# Patient Record
Sex: MALE | Race: WHITE | Employment: OTHER | URBAN - METROPOLITAN AREA
[De-identification: names, ages, dates, MRNs, and addresses within clinical notes are randomized per-mention and may not be internally consistent; named-entity substitution may affect disease eponyms.]

---

## 2017-01-26 ENCOUNTER — TELEPHONE (OUTPATIENT)
Dept: FAMILY MEDICINE CLINIC | Age: 77
End: 2017-01-26

## 2017-01-31 ENCOUNTER — TELEPHONE (OUTPATIENT)
Dept: FAMILY MEDICINE CLINIC | Age: 77
End: 2017-01-31

## 2017-02-08 PROBLEM — R10.30 LOWER ABDOMINAL PAIN: Status: ACTIVE | Noted: 2017-02-08

## 2017-02-22 DIAGNOSIS — I49.3 PVC (PREMATURE VENTRICULAR CONTRACTION): ICD-10-CM

## 2017-02-23 RX ORDER — AMIODARONE HYDROCHLORIDE 200 MG/1
TABLET ORAL
Qty: 30 TABLET | Refills: 5 | Status: SHIPPED | OUTPATIENT
Start: 2017-02-23 | End: 2017-05-22 | Stop reason: SDUPTHER

## 2017-03-13 ENCOUNTER — HOSPITAL ENCOUNTER (OUTPATIENT)
Dept: ULTRASOUND IMAGING | Age: 77
Discharge: HOME | End: 2017-03-13
Attending: INTERNAL MEDICINE | Admitting: INTERNAL MEDICINE

## 2017-03-13 DIAGNOSIS — I15.9 SECONDARY HYPERTENSION: ICD-10-CM

## 2017-03-13 DIAGNOSIS — I10 ESSENTIAL (PRIMARY) HYPERTENSION: ICD-10-CM

## 2017-03-13 LAB
ALBUMIN SERPL-MCNC: 3.9 G/DL (ref 3.4–5)
ANION GAP SERPL CALCULATED.3IONS-SCNC: 13 MMOL/L (ref 3–16)
BUN BLDV-MCNC: 20 MG/DL (ref 7–20)
CALCIUM SERPL-MCNC: 9 MG/DL (ref 8.3–10.6)
CHLORIDE BLD-SCNC: 105 MMOL/L (ref 99–110)
CO2: 27 MMOL/L (ref 21–32)
CREAT SERPL-MCNC: 1.1 MG/DL (ref 0.8–1.3)
CREATININE URINE: 191.4 MG/DL (ref 39–259)
GFR AFRICAN AMERICAN: >60
GFR NON-AFRICAN AMERICAN: >60
GLUCOSE BLD-MCNC: 86 MG/DL (ref 70–99)
HCT VFR BLD CALC: 43.8 % (ref 40.5–52.5)
HEMOGLOBIN: 14.2 G/DL (ref 13.5–17.5)
MCH RBC QN AUTO: 31.8 PG (ref 26–34)
MCHC RBC AUTO-ENTMCNC: 32.5 G/DL (ref 31–36)
MCV RBC AUTO: 98.1 FL (ref 80–100)
PARATHYROID HORMONE INTACT: 81 PG/ML (ref 14–72)
PDW BLD-RTO: 13.9 % (ref 12.4–15.4)
PHOSPHORUS: 3.5 MG/DL (ref 2.5–4.9)
PLATELET # BLD: 153 K/UL (ref 135–450)
PMV BLD AUTO: 9.7 FL (ref 5–10.5)
POTASSIUM SERPL-SCNC: 4.5 MMOL/L (ref 3.5–5.1)
PROTEIN PROTEIN: 36 MG/DL
RBC # BLD: 4.47 M/UL (ref 4.2–5.9)
SODIUM BLD-SCNC: 145 MMOL/L (ref 136–145)
VITAMIN D 25-HYDROXY: 16.8 NG/ML
WBC # BLD: 5.9 K/UL (ref 4–11)

## 2017-03-18 ENCOUNTER — OFFICE VISIT (OUTPATIENT)
Dept: FAMILY MEDICINE CLINIC | Age: 77
End: 2017-03-18

## 2017-03-18 VITALS
OXYGEN SATURATION: 95 % | BODY MASS INDEX: 24.86 KG/M2 | HEIGHT: 68 IN | SYSTOLIC BLOOD PRESSURE: 114 MMHG | WEIGHT: 164 LBS | HEART RATE: 62 BPM | DIASTOLIC BLOOD PRESSURE: 74 MMHG

## 2017-03-18 DIAGNOSIS — L21.9 SEBORRHEIC DERMATITIS: Primary | ICD-10-CM

## 2017-03-18 PROCEDURE — 99213 OFFICE O/P EST LOW 20 MIN: CPT | Performed by: FAMILY MEDICINE

## 2017-05-22 ENCOUNTER — OFFICE VISIT (OUTPATIENT)
Dept: CARDIOLOGY CLINIC | Age: 77
End: 2017-05-22

## 2017-05-22 VITALS
WEIGHT: 164.8 LBS | BODY MASS INDEX: 24.98 KG/M2 | SYSTOLIC BLOOD PRESSURE: 96 MMHG | DIASTOLIC BLOOD PRESSURE: 56 MMHG | HEART RATE: 60 BPM | OXYGEN SATURATION: 96 % | HEIGHT: 68 IN

## 2017-05-22 DIAGNOSIS — E78.00 PURE HYPERCHOLESTEROLEMIA: ICD-10-CM

## 2017-05-22 DIAGNOSIS — Z79.899 ON AMIODARONE THERAPY: ICD-10-CM

## 2017-05-22 DIAGNOSIS — I42.8 NON-ISCHEMIC CARDIOMYOPATHY (HCC): Primary | ICD-10-CM

## 2017-05-22 DIAGNOSIS — R06.02 SHORTNESS OF BREATH: ICD-10-CM

## 2017-05-22 DIAGNOSIS — I10 ESSENTIAL HYPERTENSION: ICD-10-CM

## 2017-05-22 DIAGNOSIS — I49.3 PVC (PREMATURE VENTRICULAR CONTRACTION): ICD-10-CM

## 2017-05-22 PROCEDURE — 99214 OFFICE O/P EST MOD 30 MIN: CPT | Performed by: INTERNAL MEDICINE

## 2017-05-22 PROCEDURE — 93000 ELECTROCARDIOGRAM COMPLETE: CPT | Performed by: INTERNAL MEDICINE

## 2017-05-22 RX ORDER — AMIODARONE HYDROCHLORIDE 200 MG/1
TABLET ORAL
Qty: 45 TABLET | Refills: 3 | Status: SHIPPED | OUTPATIENT
Start: 2017-05-22 | End: 2018-05-14 | Stop reason: SDUPTHER

## 2017-06-02 ENCOUNTER — HOSPITAL ENCOUNTER (OUTPATIENT)
Dept: NON INVASIVE DIAGNOSTICS | Age: 77
Discharge: HOME | End: 2017-06-02
Attending: INTERNAL MEDICINE | Admitting: INTERNAL MEDICINE

## 2017-06-02 DIAGNOSIS — R06.02 SHORTNESS OF BREATH: ICD-10-CM

## 2017-06-02 LAB
LV EF: 55 %
LVEF MODALITY: NORMAL

## 2017-06-23 RX ORDER — CARVEDILOL 25 MG/1
TABLET ORAL
Qty: 180 TABLET | Refills: 3 | Status: SHIPPED | OUTPATIENT
Start: 2017-06-23 | End: 2018-06-25 | Stop reason: SDUPTHER

## 2017-07-12 ENCOUNTER — TELEPHONE (OUTPATIENT)
Dept: OTHER | Facility: CLINIC | Age: 77
End: 2017-07-12

## 2017-07-14 ENCOUNTER — TELEPHONE (OUTPATIENT)
Dept: FAMILY MEDICINE CLINIC | Age: 77
End: 2017-07-14

## 2017-07-17 RX ORDER — OMEPRAZOLE 20 MG/1
CAPSULE, DELAYED RELEASE ORAL
Qty: 30 CAPSULE | Refills: 2 | Status: SHIPPED | OUTPATIENT
Start: 2017-07-17 | End: 2017-07-28

## 2017-07-28 PROBLEM — J30.9 ALLERGIC RHINITIS: Status: ACTIVE | Noted: 2017-07-28

## 2018-03-17 RX ORDER — DICLOFENAC SODIUM 75 MG/1
TABLET, DELAYED RELEASE ORAL
Qty: 60 TABLET | Refills: 0 | Status: SHIPPED | OUTPATIENT
Start: 2018-03-17 | End: 2019-01-04

## 2018-05-14 DIAGNOSIS — I49.3 PVC (PREMATURE VENTRICULAR CONTRACTION): ICD-10-CM

## 2018-05-17 RX ORDER — AMIODARONE HYDROCHLORIDE 200 MG/1
TABLET ORAL
Qty: 45 TABLET | Refills: 2 | Status: SHIPPED | OUTPATIENT
Start: 2018-05-17 | End: 2019-02-12 | Stop reason: SDUPTHER

## 2018-05-22 ENCOUNTER — OFFICE VISIT (OUTPATIENT)
Dept: CARDIOLOGY CLINIC | Age: 78
End: 2018-05-22

## 2018-05-22 VITALS
BODY MASS INDEX: 25.58 KG/M2 | WEIGHT: 168.8 LBS | DIASTOLIC BLOOD PRESSURE: 68 MMHG | SYSTOLIC BLOOD PRESSURE: 116 MMHG | HEIGHT: 68 IN | HEART RATE: 66 BPM

## 2018-05-22 DIAGNOSIS — E78.2 MIXED HYPERLIPIDEMIA: ICD-10-CM

## 2018-05-22 DIAGNOSIS — I42.8 NON-ISCHEMIC CARDIOMYOPATHY (HCC): Primary | ICD-10-CM

## 2018-05-22 DIAGNOSIS — R00.2 PALPITATION: ICD-10-CM

## 2018-05-22 DIAGNOSIS — I49.3 PVC (PREMATURE VENTRICULAR CONTRACTION): ICD-10-CM

## 2018-05-22 PROCEDURE — 4040F PNEUMOC VAC/ADMIN/RCVD: CPT | Performed by: INTERNAL MEDICINE

## 2018-05-22 PROCEDURE — 3017F COLORECTAL CA SCREEN DOC REV: CPT | Performed by: INTERNAL MEDICINE

## 2018-05-22 PROCEDURE — G8598 ASA/ANTIPLAT THER USED: HCPCS | Performed by: INTERNAL MEDICINE

## 2018-05-22 PROCEDURE — G8417 CALC BMI ABV UP PARAM F/U: HCPCS | Performed by: INTERNAL MEDICINE

## 2018-05-22 PROCEDURE — 99214 OFFICE O/P EST MOD 30 MIN: CPT | Performed by: INTERNAL MEDICINE

## 2018-05-22 PROCEDURE — 93000 ELECTROCARDIOGRAM COMPLETE: CPT | Performed by: INTERNAL MEDICINE

## 2018-05-22 PROCEDURE — G8427 DOCREV CUR MEDS BY ELIG CLIN: HCPCS | Performed by: INTERNAL MEDICINE

## 2018-05-22 PROCEDURE — 1123F ACP DISCUSS/DSCN MKR DOCD: CPT | Performed by: INTERNAL MEDICINE

## 2018-05-22 PROCEDURE — 1036F TOBACCO NON-USER: CPT | Performed by: INTERNAL MEDICINE

## 2018-05-29 ENCOUNTER — HOSPITAL ENCOUNTER (OUTPATIENT)
Dept: NON INVASIVE DIAGNOSTICS | Age: 78
Discharge: HOME | End: 2018-05-29
Attending: INTERNAL MEDICINE | Admitting: INTERNAL MEDICINE

## 2018-05-29 DIAGNOSIS — I42.8 NON-ISCHEMIC CARDIOMYOPATHY (HCC): ICD-10-CM

## 2018-05-29 LAB
LEFT VENTRICULAR EJECTION FRACTION HIGH VALUE: 55 %
LEFT VENTRICULAR EJECTION FRACTION MODE: NORMAL
LV EF: 55 %
LVEF MODALITY: NORMAL

## 2018-05-29 PROCEDURE — 93224 XTRNL ECG REC UP TO 48 HRS: CPT | Performed by: INTERNAL MEDICINE

## 2018-06-01 ENCOUNTER — TELEPHONE (OUTPATIENT)
Dept: CARDIOLOGY CLINIC | Age: 78
End: 2018-06-01

## 2018-06-29 RX ORDER — CARVEDILOL 25 MG/1
TABLET ORAL
Qty: 180 TABLET | Refills: 2 | Status: SHIPPED | OUTPATIENT
Start: 2018-06-29 | End: 2018-07-02 | Stop reason: SDUPTHER

## 2018-07-02 ENCOUNTER — OFFICE VISIT (OUTPATIENT)
Dept: FAMILY MEDICINE CLINIC | Age: 78
End: 2018-07-02

## 2018-07-02 VITALS
WEIGHT: 168 LBS | DIASTOLIC BLOOD PRESSURE: 60 MMHG | SYSTOLIC BLOOD PRESSURE: 86 MMHG | BODY MASS INDEX: 25.54 KG/M2 | OXYGEN SATURATION: 97 % | HEART RATE: 59 BPM

## 2018-07-02 DIAGNOSIS — R97.20 PSA ELEVATION: ICD-10-CM

## 2018-07-02 DIAGNOSIS — I10 ESSENTIAL HYPERTENSION: Primary | ICD-10-CM

## 2018-07-02 DIAGNOSIS — R14.0 ABDOMINAL BLOATING: ICD-10-CM

## 2018-07-02 DIAGNOSIS — E78.00 PURE HYPERCHOLESTEROLEMIA: ICD-10-CM

## 2018-07-02 DIAGNOSIS — I49.3 PVC (PREMATURE VENTRICULAR CONTRACTION): ICD-10-CM

## 2018-07-02 PROCEDURE — 99214 OFFICE O/P EST MOD 30 MIN: CPT | Performed by: FAMILY MEDICINE

## 2018-07-02 PROCEDURE — 1036F TOBACCO NON-USER: CPT | Performed by: FAMILY MEDICINE

## 2018-07-02 PROCEDURE — G8417 CALC BMI ABV UP PARAM F/U: HCPCS | Performed by: FAMILY MEDICINE

## 2018-07-02 PROCEDURE — G8598 ASA/ANTIPLAT THER USED: HCPCS | Performed by: FAMILY MEDICINE

## 2018-07-02 PROCEDURE — 1123F ACP DISCUSS/DSCN MKR DOCD: CPT | Performed by: FAMILY MEDICINE

## 2018-07-02 PROCEDURE — 4040F PNEUMOC VAC/ADMIN/RCVD: CPT | Performed by: FAMILY MEDICINE

## 2018-07-02 PROCEDURE — G8427 DOCREV CUR MEDS BY ELIG CLIN: HCPCS | Performed by: FAMILY MEDICINE

## 2018-07-02 PROCEDURE — 3017F COLORECTAL CA SCREEN DOC REV: CPT | Performed by: FAMILY MEDICINE

## 2018-07-02 RX ORDER — CARVEDILOL 25 MG/1
12.5 TABLET ORAL 2 TIMES DAILY
Qty: 180 TABLET | Refills: 2
Start: 2018-07-02 | End: 2020-07-07

## 2018-07-02 RX ORDER — TIMOLOL MALEATE 5 MG/ML
SOLUTION/ DROPS OPHTHALMIC
COMMUNITY
Start: 2018-06-03 | End: 2019-01-04

## 2018-07-02 ASSESSMENT — ENCOUNTER SYMPTOMS
VOMITING: 0
CONSTIPATION: 1
ABDOMINAL PAIN: 0
COUGH: 0
ABDOMINAL DISTENTION: 1
SHORTNESS OF BREATH: 0
NAUSEA: 0
DIARRHEA: 0
BLOOD IN STOOL: 0

## 2018-08-28 ENCOUNTER — OFFICE VISIT (OUTPATIENT)
Dept: CARDIOLOGY CLINIC | Age: 78
End: 2018-08-28

## 2018-08-28 VITALS
SYSTOLIC BLOOD PRESSURE: 114 MMHG | HEIGHT: 68 IN | DIASTOLIC BLOOD PRESSURE: 72 MMHG | WEIGHT: 165 LBS | HEART RATE: 59 BPM | BODY MASS INDEX: 25.01 KG/M2

## 2018-08-28 DIAGNOSIS — I35.1 AORTIC VALVE INSUFFICIENCY, ETIOLOGY OF CARDIAC VALVE DISEASE UNSPECIFIED: ICD-10-CM

## 2018-08-28 DIAGNOSIS — I25.10 CORONARY ARTERY DISEASE INVOLVING NATIVE CORONARY ARTERY OF NATIVE HEART WITHOUT ANGINA PECTORIS: ICD-10-CM

## 2018-08-28 DIAGNOSIS — I49.3 PVC'S (PREMATURE VENTRICULAR CONTRACTIONS): ICD-10-CM

## 2018-08-28 DIAGNOSIS — E78.2 MIXED HYPERLIPIDEMIA: ICD-10-CM

## 2018-08-28 DIAGNOSIS — Z79.899 ON AMIODARONE THERAPY: ICD-10-CM

## 2018-08-28 DIAGNOSIS — I34.0 MITRAL VALVE INSUFFICIENCY, UNSPECIFIED ETIOLOGY: ICD-10-CM

## 2018-08-28 DIAGNOSIS — I42.8 NON-ISCHEMIC CARDIOMYOPATHY (HCC): ICD-10-CM

## 2018-08-28 DIAGNOSIS — R53.83 FATIGUE, UNSPECIFIED TYPE: Primary | ICD-10-CM

## 2018-08-28 PROCEDURE — G8427 DOCREV CUR MEDS BY ELIG CLIN: HCPCS | Performed by: INTERNAL MEDICINE

## 2018-08-28 PROCEDURE — 93000 ELECTROCARDIOGRAM COMPLETE: CPT | Performed by: INTERNAL MEDICINE

## 2018-08-28 PROCEDURE — 99214 OFFICE O/P EST MOD 30 MIN: CPT | Performed by: INTERNAL MEDICINE

## 2018-08-28 PROCEDURE — G8598 ASA/ANTIPLAT THER USED: HCPCS | Performed by: INTERNAL MEDICINE

## 2018-08-28 PROCEDURE — G8417 CALC BMI ABV UP PARAM F/U: HCPCS | Performed by: INTERNAL MEDICINE

## 2018-08-28 PROCEDURE — 1101F PT FALLS ASSESS-DOCD LE1/YR: CPT | Performed by: INTERNAL MEDICINE

## 2018-08-28 PROCEDURE — 1036F TOBACCO NON-USER: CPT | Performed by: INTERNAL MEDICINE

## 2018-08-28 PROCEDURE — 1123F ACP DISCUSS/DSCN MKR DOCD: CPT | Performed by: INTERNAL MEDICINE

## 2018-08-28 PROCEDURE — 4040F PNEUMOC VAC/ADMIN/RCVD: CPT | Performed by: INTERNAL MEDICINE

## 2018-08-28 NOTE — PROGRESS NOTES
6/2017  Summary   Left ventricular size is mildly increased . Normal left ventricular wall   thickness.   Overall left ventricular function is normal.   Ejection fraction is visually estimated to be 55 %.   Mitral valve prolapse is present.   Moderate to severe mitral regurgitation is present.   The left atrium is at the upper limits of normal in size.   Aortic valve appears sclerotic but opens adequately.   Mild-to-moderate aortic regurgitation is present.  Elverna Terra is mild tricuspid regurgitation with RVSP estimated at 28 mmHg. Stress test: 1/25/15 -   Summary   Small-moderate sized inferolateral completely reversible defect consistent   with ischemia in the territory of the mid and distal LCx and/or RCA . Mild LV enlargement with global hypokinesis and EF 37%     CTA: 2/10/15:  Mild narrowing of the left main coronary secondary to kinking, 10% in diameter. 50% diameter stenosis of the proximal LAD. Right coronary dominance.       MUGA: 1/12/16: 41%    5/2016 Holter: PVC burden 7.8%   5/2018 Holter: PVC burden 14.5%    Assessment:   Patient Active Problem List    Diagnosis Date Noted    Allergic rhinitis 07/28/2017    Lower abdominal pain 02/08/2017    Chronic kidney disease, stage II (mild) 11/29/2016    Cardiomyopathy ([address] 06/30/2015    HTN (hypertension) 06/30/2015    CAD (coronary artery disease) 02/04/2015    LV dysfunction 01/23/2015    PVC (premature ventricular contraction) 01/23/2015    Mitral insufficiency 01/23/2015    Aortic insufficiency 01/23/2015    Cardiac arrhythmia 01/06/2015    Insomnia 08/04/2014    GERD (gastroesophageal reflux disease) 04/10/2014    BPH (benign prostatic hyperplasia) 10/10/2013    Cancer ([address]     Cerebral artery occlusion with cerebral infarction ([address]     Pure hypercholesterolemia     Degeneration of lumbar or lumbosacral intervertebral disc     Irritable bowel syndrome     Acute, but ill-defined, cerebrovascular disease     Degeneration of cervical intervertebral disc     Migraine       Plan:    - Frequent PVCs:    - Recent holter 5/2018 with PVC burden of 14% increased    - History of relatively high PVC burden with different morphology. - I have discussed ablation in the past and he has declined. - Treated with amiodarone and dose has been decreased to 100 mg/day. - Prior ECGs with two forms of PVCs  - One form of PVC with superior axis (negative on aVF), positive in I and aVL and, transition in V2 w/ small r wave on V1  - Second from with inferior axis (positive in II, III, aVF), LBBB and transition in V4 suggesting outflow tract PVCs. - TSH 2.05 (5/2018), Cr 1.24 (5/2018)  , AST 14 (5/2018)  , ALT 9(5/2018)         Again discussed ablation of PVCs with patient and his wife present. Risks benefits and alternatives were discussed in detail. Patient is not interested in pursuing with ablation at this time. He states that he feels well and has no symptoms. His last ejection fraction is 55% and he is asymptomatic. Echocardiography 5/2018 showed no change in LVEF     - Non-ischemic Cardiomyopathy:    - 1/2015 Echo EF 35-40%   - 1/2016 MUGA EF 41%    - 6/2017 echo LVEF 55%   - 5/2018 echo LVEF 55%   - Carvedilol 25 mg twice a day   - Continue with medical therapy.      - Cath with non-obstructive CAD.    - CT scan with mild ostial left main and LAD disease.    - Aspirin 81 mg daily   - Lovastatin 10 mg daily   - Carvedilol 25mg BID         - BP: Controlled      - HLD: Controlled. On statin. -Mitral and aortic regurgiation   - Annual echocardiogram     Moderate MR with echocardiography:    - Annual echocardiogram      Discussed lower abnormality with the patient's family. He needs surveillance echocardiography for evaluation of his valvular issues. If he becomes symptomatic, or worse by TYRONE then TYRONE is recommended. FU in 1 year with echo    Scribe's attestation:   This note was scribed in the presence of Jillian Ro M.D. by Ping Metz RN.      Physician Attestation: I, Dr. Jillian Ro, confirm that the scribe's documentation has been prepared under my direction and personally reviewed by me in its entirety. I also confirm that the note above accurately reflects all work, treatment, procedures, and medical decision making performed by me. NOTE: This report was transcribed using voice recognition software. Every effort was made to ensure accuracy, however, inadvertent computerized transcription errors may be present.       Jillian Ro MD, MPH  [facility]   Office: [phone]

## 2018-09-27 RX ORDER — AMITRIPTYLINE HYDROCHLORIDE 10 MG/1
TABLET, FILM COATED ORAL
Qty: 90 TABLET | Refills: 1 | Status: SHIPPED | OUTPATIENT
Start: 2018-09-27 | End: 2019-03-25 | Stop reason: SDUPTHER

## 2018-11-27 DIAGNOSIS — N28.9 KIDNEY FUNCTION ABNORMAL: Primary | ICD-10-CM

## 2019-01-04 ENCOUNTER — OFFICE VISIT (OUTPATIENT)
Dept: FAMILY MEDICINE CLINIC | Age: 79
End: 2019-01-04
Payer: MEDICARE

## 2019-01-04 VITALS
DIASTOLIC BLOOD PRESSURE: 72 MMHG | SYSTOLIC BLOOD PRESSURE: 120 MMHG | WEIGHT: 168 LBS | OXYGEN SATURATION: 95 % | BODY MASS INDEX: 25.54 KG/M2 | HEART RATE: 71 BPM

## 2019-01-04 DIAGNOSIS — I25.10 CORONARY ARTERY DISEASE INVOLVING NATIVE HEART WITHOUT ANGINA PECTORIS, UNSPECIFIED VESSEL OR LESION TYPE: Chronic | ICD-10-CM

## 2019-01-04 DIAGNOSIS — I10 ESSENTIAL HYPERTENSION: ICD-10-CM

## 2019-01-04 DIAGNOSIS — N18.30 CKD (CHRONIC KIDNEY DISEASE), STAGE III (HCC): ICD-10-CM

## 2019-01-04 DIAGNOSIS — E78.00 PURE HYPERCHOLESTEROLEMIA: Primary | ICD-10-CM

## 2019-01-04 DIAGNOSIS — M51.37 DEGENERATION OF LUMBAR OR LUMBOSACRAL INTERVERTEBRAL DISC: ICD-10-CM

## 2019-01-04 PROCEDURE — G8427 DOCREV CUR MEDS BY ELIG CLIN: HCPCS | Performed by: FAMILY MEDICINE

## 2019-01-04 PROCEDURE — 1123F ACP DISCUSS/DSCN MKR DOCD: CPT | Performed by: FAMILY MEDICINE

## 2019-01-04 PROCEDURE — G8598 ASA/ANTIPLAT THER USED: HCPCS | Performed by: FAMILY MEDICINE

## 2019-01-04 PROCEDURE — 1101F PT FALLS ASSESS-DOCD LE1/YR: CPT | Performed by: FAMILY MEDICINE

## 2019-01-04 PROCEDURE — 4040F PNEUMOC VAC/ADMIN/RCVD: CPT | Performed by: FAMILY MEDICINE

## 2019-01-04 PROCEDURE — 99214 OFFICE O/P EST MOD 30 MIN: CPT | Performed by: FAMILY MEDICINE

## 2019-01-04 PROCEDURE — 1036F TOBACCO NON-USER: CPT | Performed by: FAMILY MEDICINE

## 2019-01-04 PROCEDURE — G8417 CALC BMI ABV UP PARAM F/U: HCPCS | Performed by: FAMILY MEDICINE

## 2019-01-04 PROCEDURE — G8482 FLU IMMUNIZE ORDER/ADMIN: HCPCS | Performed by: FAMILY MEDICINE

## 2019-01-04 RX ORDER — LATANOPROST 50 UG/ML
1 SOLUTION/ DROPS OPHTHALMIC DAILY
COMMUNITY
Start: 2018-12-07 | End: 2020-07-07

## 2019-01-04 ASSESSMENT — PATIENT HEALTH QUESTIONNAIRE - PHQ9
1. LITTLE INTEREST OR PLEASURE IN DOING THINGS: 0
SUM OF ALL RESPONSES TO PHQ QUESTIONS 1-9: 0
SUM OF ALL RESPONSES TO PHQ QUESTIONS 1-9: 0
SUM OF ALL RESPONSES TO PHQ9 QUESTIONS 1 & 2: 0
2. FEELING DOWN, DEPRESSED OR HOPELESS: 0

## 2019-01-06 ASSESSMENT — ENCOUNTER SYMPTOMS
COUGH: 0
SHORTNESS OF BREATH: 0

## 2019-02-12 DIAGNOSIS — I49.3 PVC (PREMATURE VENTRICULAR CONTRACTION): ICD-10-CM

## 2019-02-12 RX ORDER — AMIODARONE HYDROCHLORIDE 200 MG/1
TABLET ORAL
Qty: 45 TABLET | Refills: 3 | Status: SHIPPED | OUTPATIENT
Start: 2019-02-12 | End: 2020-02-10 | Stop reason: SDUPTHER

## 2019-02-22 ENCOUNTER — OFFICE VISIT (OUTPATIENT)
Dept: FAMILY MEDICINE CLINIC | Age: 79
End: 2019-02-22
Payer: MEDICARE

## 2019-02-22 VITALS
SYSTOLIC BLOOD PRESSURE: 110 MMHG | WEIGHT: 170 LBS | HEART RATE: 77 BPM | OXYGEN SATURATION: 95 % | DIASTOLIC BLOOD PRESSURE: 70 MMHG | TEMPERATURE: 100.1 F | BODY MASS INDEX: 25.85 KG/M2

## 2019-02-22 DIAGNOSIS — J10.1 INFLUENZA A: Primary | ICD-10-CM

## 2019-02-22 LAB
INFLUENZA A ANTIGEN, POC: POSITIVE
INFLUENZA B ANTIGEN, POC: NEGATIVE

## 2019-02-22 PROCEDURE — 99213 OFFICE O/P EST LOW 20 MIN: CPT | Performed by: PHYSICIAN ASSISTANT

## 2019-02-22 PROCEDURE — G8417 CALC BMI ABV UP PARAM F/U: HCPCS | Performed by: PHYSICIAN ASSISTANT

## 2019-02-22 PROCEDURE — 4040F PNEUMOC VAC/ADMIN/RCVD: CPT | Performed by: PHYSICIAN ASSISTANT

## 2019-02-22 PROCEDURE — G8598 ASA/ANTIPLAT THER USED: HCPCS | Performed by: PHYSICIAN ASSISTANT

## 2019-02-22 PROCEDURE — 1036F TOBACCO NON-USER: CPT | Performed by: PHYSICIAN ASSISTANT

## 2019-02-22 PROCEDURE — G8482 FLU IMMUNIZE ORDER/ADMIN: HCPCS | Performed by: PHYSICIAN ASSISTANT

## 2019-02-22 PROCEDURE — G8427 DOCREV CUR MEDS BY ELIG CLIN: HCPCS | Performed by: PHYSICIAN ASSISTANT

## 2019-02-22 PROCEDURE — 1101F PT FALLS ASSESS-DOCD LE1/YR: CPT | Performed by: PHYSICIAN ASSISTANT

## 2019-02-22 PROCEDURE — 87804 INFLUENZA ASSAY W/OPTIC: CPT | Performed by: PHYSICIAN ASSISTANT

## 2019-02-22 PROCEDURE — 1123F ACP DISCUSS/DSCN MKR DOCD: CPT | Performed by: PHYSICIAN ASSISTANT

## 2019-02-22 RX ORDER — OSELTAMIVIR PHOSPHATE 75 MG/1
75 CAPSULE ORAL 2 TIMES DAILY
Qty: 10 CAPSULE | Refills: 0 | Status: SHIPPED | OUTPATIENT
Start: 2019-02-22 | End: 2019-02-27

## 2019-02-22 ASSESSMENT — ENCOUNTER SYMPTOMS
SORE THROAT: 1
COUGH: 1
SHORTNESS OF BREATH: 0
EYE PAIN: 0
WHEEZING: 0
VOMITING: 0
NAUSEA: 0
DIARRHEA: 0
TROUBLE SWALLOWING: 0
RHINORRHEA: 0

## 2019-03-25 RX ORDER — AMITRIPTYLINE HYDROCHLORIDE 10 MG/1
TABLET, FILM COATED ORAL
Qty: 90 TABLET | Refills: 1 | Status: SHIPPED | OUTPATIENT
Start: 2019-03-25 | End: 2019-09-22 | Stop reason: SDUPTHER

## 2019-05-21 ENCOUNTER — HOSPITAL ENCOUNTER (OUTPATIENT)
Dept: NON INVASIVE DIAGNOSTICS | Age: 79
Discharge: HOME | End: 2019-05-21
Payer: MEDICARE

## 2019-05-21 DIAGNOSIS — I34.0 MITRAL VALVE INSUFFICIENCY, UNSPECIFIED ETIOLOGY: ICD-10-CM

## 2019-05-21 PROCEDURE — 93306 TTE W/DOPPLER COMPLETE: CPT

## 2019-07-05 ENCOUNTER — OFFICE VISIT (OUTPATIENT)
Dept: FAMILY MEDICINE CLINIC | Age: 79
End: 2019-07-05
Payer: MEDICARE

## 2019-07-05 VITALS
HEART RATE: 62 BPM | BODY MASS INDEX: 25.39 KG/M2 | SYSTOLIC BLOOD PRESSURE: 98 MMHG | OXYGEN SATURATION: 96 % | DIASTOLIC BLOOD PRESSURE: 64 MMHG | WEIGHT: 167 LBS

## 2019-07-05 DIAGNOSIS — E78.00 PURE HYPERCHOLESTEROLEMIA: ICD-10-CM

## 2019-07-05 DIAGNOSIS — R10.30 LOWER ABDOMINAL PAIN: ICD-10-CM

## 2019-07-05 DIAGNOSIS — N40.1 BENIGN PROSTATIC HYPERPLASIA WITH LOWER URINARY TRACT SYMPTOMS, SYMPTOM DETAILS UNSPECIFIED: ICD-10-CM

## 2019-07-05 DIAGNOSIS — I34.0 MITRAL VALVE INSUFFICIENCY, UNSPECIFIED ETIOLOGY: ICD-10-CM

## 2019-07-05 DIAGNOSIS — I35.1 NONRHEUMATIC AORTIC VALVE INSUFFICIENCY: ICD-10-CM

## 2019-07-05 DIAGNOSIS — R14.0 ABDOMINAL BLOATING: Primary | ICD-10-CM

## 2019-07-05 PROCEDURE — 99214 OFFICE O/P EST MOD 30 MIN: CPT | Performed by: FAMILY MEDICINE

## 2019-07-05 PROCEDURE — G8598 ASA/ANTIPLAT THER USED: HCPCS | Performed by: FAMILY MEDICINE

## 2019-07-05 PROCEDURE — G8417 CALC BMI ABV UP PARAM F/U: HCPCS | Performed by: FAMILY MEDICINE

## 2019-07-05 PROCEDURE — 1036F TOBACCO NON-USER: CPT | Performed by: FAMILY MEDICINE

## 2019-07-05 PROCEDURE — 1123F ACP DISCUSS/DSCN MKR DOCD: CPT | Performed by: FAMILY MEDICINE

## 2019-07-05 PROCEDURE — G8427 DOCREV CUR MEDS BY ELIG CLIN: HCPCS | Performed by: FAMILY MEDICINE

## 2019-07-05 PROCEDURE — 4040F PNEUMOC VAC/ADMIN/RCVD: CPT | Performed by: FAMILY MEDICINE

## 2019-07-05 ASSESSMENT — ENCOUNTER SYMPTOMS
FLATUS: 1
SHORTNESS OF BREATH: 1
CONSTIPATION: 1
BELCHING: 1
ABDOMINAL PAIN: 1

## 2019-07-05 NOTE — PROGRESS NOTES
SUBJECTIVE:    Kayla Zamudio is a 70 y.o. male who presents for a follow up visit. Chief Complaint   Patient presents with    6 Month Follow-Up     Patient presents for 6 month follow up. Patient states he has no questions or concerns. Hyperlipidemia   This is a chronic problem. The current episode started more than 1 year ago. The problem is controlled. Lipid results: Total cholesterol 142, triglycerides 91, HDL 47, LDL 77. Factors aggravating his hyperlipidemia include fatty foods. Associated symptoms include shortness of breath. Pertinent negatives include no chest pain, leg pain or myalgias. Current antihyperlipidemic treatment includes statins. The current treatment provides significant improvement of lipids. Compliance problems include adherence to exercise. Risk factors for coronary artery disease include dyslipidemia, male sex and a sedentary lifestyle. Coronary Artery Disease   Presents for follow-up visit. Symptoms include shortness of breath. Pertinent negatives include no chest pain, leg swelling, palpitations or weight gain. Risk factors include hyperlipidemia. The symptoms have been stable. Compliance with diet is good. Compliance with exercise is variable. Compliance with medications is good. Abdominal Pain   This is a chronic problem. The current episode started more than 1 year ago. The onset quality is gradual. The problem occurs intermittently. The problem has been waxing and waning. The pain is located in the generalized abdominal region. Quality: More uncomfortable with bloated feeling rather than actual pain. The abdominal pain does not radiate. Associated symptoms include belching, constipation (relieved by Miralx daily), flatus and frequency. Pertinent negatives include no myalgias. Relieved by: Gas x helps some. Treatments tried: Gas X. The treatment provided mild relief. Prior diagnostic workup includes lower endoscopy.    Benign Prostatic Hypertrophy   This is a chronic problem. The problem is unchanged. Irritative symptoms include frequency and nocturia (x 1 to 2). Obstructive symptoms include dribbling, incomplete emptying, an intermittent stream, a slower stream and a weak stream. Obstructive symptoms do not include straining. Nothing aggravates the symptoms. Treatments tried: Unsure. Thinks he was tried on a medication previously. Patient's medications, allergies, past medical,surgical, social and family histories were reviewed and updated as appropriate.      Past Medical History:   Diagnosis Date    Acute, but ill-defined, cerebrovascular disease     Cancer [address] [address]     skin cancer of scalp - basal    Degeneration of cervical intervertebral disc     Degeneration of lumbar or lumbosacral intervertebral disc     Enlarged prostate     Hypoglycemia, unspecified     Irritable bowel syndrome     Migraine, unspecified, without mention of intractable migraine without mention of status migrainosus     Proteinuria     Pure hypercholesterolemia     Unspecified cerebral artery occlusion with cerebral infarction 2011    bleed      Past Surgical History:   Procedure Laterality Date    BACK SURGERY      CARDIAC CATHETERIZATION      attempted stent, but wouldn't hold     Family History   Problem Relation Age of Onset    Diabetes Mother     Heart Disease Mother     Stroke Brother     High Cholesterol Neg Hx     High Blood Pressure Neg Hx      Social History     Socioeconomic History    Marital status:      Spouse name: Not on file    Number of children: Not on file    Years of education: Not on file    Highest education level: Not on file   Occupational History    Not on file   Social Needs    Financial resource strain: Not on file    Food insecurity:     Worry: Not on file     Inability: Not on file    Transportation needs:     Medical: Not on file     Non-medical: Not on file   Tobacco Use    Smoking status: Never Smoker    Smokeless tobacco: Never Used Constitutional: Positive for fatigue. Negative for activity change, unexpected weight change and weight gain. HENT: Negative. Respiratory: Positive for shortness of breath. Cardiovascular: Negative for chest pain, palpitations and leg swelling. Gastrointestinal: Positive for abdominal pain, constipation (relieved by Miralx daily) and flatus. Genitourinary: Positive for difficulty urinating (BPH), frequency, incomplete emptying and nocturia (x 1 to 2). Musculoskeletal: Negative for myalgias. OBJECTIVE:    BP 98/64 (Site: Left Upper Arm, Position: Sitting, Cuff Size: Medium Adult)   Pulse 62   Wt 167 lb (75.8 kg)   SpO2 96%   BMI 25.39 kg/m²    Physical Exam   Constitutional: He is oriented to person, place, and time. He appears well-developed and well-nourished. HENT:   Head: Normocephalic and atraumatic. Right Ear: External ear normal.   Left Ear: External ear normal.   Nose: Nose normal.   Mouth/Throat: Oropharynx is clear and moist.   Eyes: Conjunctivae and EOM are normal.   Neck: Normal range of motion. Neck supple. No JVD present. No tracheal deviation present. No thyromegaly present. Cardiovascular: Normal rate and regular rhythm. Murmur heard. Diastolic murmur is present with a grade of 2/6. Pulmonary/Chest: Effort normal and breath sounds normal. No respiratory distress. He has no rales. Lymphadenopathy:     He has no cervical adenopathy. Neurological: He is alert and oriented to person, place, and time. Skin: Skin is warm and dry. Psychiatric: He has a normal mood and affect. ASSESSMENT/PLAN:    Tyrone Reed was seen today for 6 month follow-up.     Diagnoses and all orders for this visit:    Abdominal bloating  -     MANUELA - Tej Arana MD, Gastroenterology, [facility]    Benign prostatic hyperplasia with lower urinary tract symptoms, symptom details unspecified  -     MANUELA - Hellen Tong MD, [facility], [facility]    Mitral valve insufficiency, unspecified etiology  Followed by cardiology    Nonrheumatic aortic valve insufficiency  Followed by cardiology    Pure hypercholesterolemia  -     Comprehensive Metabolic Panel, Fasting; Future  -     CBC Auto Differential; Future  -     Lipid, Fasting; Future  -     TSH with Reflex; Future        Return in about 6 months (around 1/5/2020). With fasting lab prior. Please note portions of this note were completed with a voicerecognition program.  Efforts were made to edit the dictations but occasionally words are mis-transcribed.

## 2019-08-30 NOTE — PROGRESS NOTES
RVSP 14 mmHg   -Moderate pulmonic regurgitation present.       Echo: 5/2018   Summary   -Normal left ventricle size and systolic function with an estimated ejection   fraction of 55%.  -No regional wall motion abnormalities are seen.   -There is mild concentric left ventricular hypertrophy.   -Diastolic filling parameters suggest grade I diastolic dysfunction.   G/M=27.54   -Posterior mitral valve prolapse is present.   -Moderate to severe mitral regurgitation is present.   -Aortic valve appears sclerotic but opens adequately.   -Moderate aortic regurgitation.   -Mild tricuspid regurgitation with RVSP 31 mmHg.   -Moderate pulmonic regurgitation present. LA Dimension: 4 cm    Echo: 6/2017  Summary   Left ventricular size is mildly increased . Normal left ventricular wall   thickness.   Overall left ventricular function is normal.   Ejection fraction is visually estimated to be 55 %.   Mitral valve prolapse is present.   Moderate to severe mitral regurgitation is present.   The left atrium is at the upper limits of normal in size.   Aortic valve appears sclerotic but opens adequately.   Mild-to-moderate aortic regurgitation is present.  Poinsett Mullet is mild tricuspid regurgitation with RVSP estimated at 28 mmHg. Stress test: 1/25/15 -   Summary   Small-moderate sized inferolateral completely reversible defect consistent   with ischemia in the territory of the mid and distal LCx and/or RCA . Mild LV enlargement with global hypokinesis and EF 37%     CTA: 2/10/15:  Mild narrowing of the left main coronary secondary to kinking, 10% in diameter. 50% diameter stenosis of the proximal LAD. Right coronary dominance. MUGA: 1/12/16: 41%    5/2016 Holter: PVC burden 7.8%   5/2018 Holter: PVC burden 14.5%       Plan:    - Frequent PVCs:    Patient has history of frequent PVC in the past.   Ablation has been discussed multiple times in the past and he declined.    He has been treated with low-dose amiodarone therapy and luis fernando, confirm that the scribe's documentation has been prepared under my direction and personally reviewed by me in its entirety. I also confirm that the note above accurately reflects all work, treatment, procedures, and medical decision making performed by me.

## 2019-09-03 ENCOUNTER — OFFICE VISIT (OUTPATIENT)
Dept: CARDIOLOGY CLINIC | Age: 79
End: 2019-09-03
Payer: MEDICARE

## 2019-09-03 VITALS
HEART RATE: 70 BPM | RESPIRATION RATE: 16 BRPM | BODY MASS INDEX: 25.16 KG/M2 | DIASTOLIC BLOOD PRESSURE: 69 MMHG | WEIGHT: 166 LBS | SYSTOLIC BLOOD PRESSURE: 116 MMHG | HEIGHT: 68 IN

## 2019-09-03 DIAGNOSIS — Z79.899 ON AMIODARONE THERAPY: ICD-10-CM

## 2019-09-03 DIAGNOSIS — I49.3 PVC (PREMATURE VENTRICULAR CONTRACTION): Primary | ICD-10-CM

## 2019-09-03 PROCEDURE — G8427 DOCREV CUR MEDS BY ELIG CLIN: HCPCS | Performed by: INTERNAL MEDICINE

## 2019-09-03 PROCEDURE — G8598 ASA/ANTIPLAT THER USED: HCPCS | Performed by: INTERNAL MEDICINE

## 2019-09-03 PROCEDURE — 4040F PNEUMOC VAC/ADMIN/RCVD: CPT | Performed by: INTERNAL MEDICINE

## 2019-09-03 PROCEDURE — 1123F ACP DISCUSS/DSCN MKR DOCD: CPT | Performed by: INTERNAL MEDICINE

## 2019-09-03 PROCEDURE — 1036F TOBACCO NON-USER: CPT | Performed by: INTERNAL MEDICINE

## 2019-09-03 PROCEDURE — 99214 OFFICE O/P EST MOD 30 MIN: CPT | Performed by: INTERNAL MEDICINE

## 2019-09-03 PROCEDURE — 93000 ELECTROCARDIOGRAM COMPLETE: CPT | Performed by: INTERNAL MEDICINE

## 2019-09-03 PROCEDURE — G8417 CALC BMI ABV UP PARAM F/U: HCPCS | Performed by: INTERNAL MEDICINE

## 2019-09-03 NOTE — LETTER
[facility], [place], operated by [facility]  EP Procedure Sheet  Paulo Mcardle  1940    EP Procedures     Pacemaker implant (single/dual)  EP Study    ICD implant (single/dual)  Atrial flutter ablation (TYRONE Y/N)    Biv implant ICD  Cryoablation    Biv implant PPM  Atrial fibrillation ablation (TYRONE Yes)    Generator Change (PPM/ICD/BiV)  SVT ablation    Lead revision (RV/LA/RA) (<1 month)  VT ablation      Lead extraction +/- upgrade (BiV/PPM/ICD)  VT Ischemic/ non-ischemic    Loop implant/ removal  VT RVOT    Cardioversion  VT Left sided   xxxx TYRONE  AVN ablation     Equipment     [employer]   SEVERO Mapping System    [place]  [id number] [address] [employer]  CryoAblation    [employer]  Laser Lead Extraction     EP Procedures Scheduling Request    # hours Requested     Specific Day    Anesthesia    CT surgery backup    Overnight stay    Location [facility]     Pre-Procedure Labs / Imaging     PT/INR  Type & cross    CBC  Units PRBC    BMP/Mg  Units FFP    Venogram  CXR    Echo  Cardiac CTA for Pulmonary vein mapping     RN INITIALS: RA    Patient Instructions  Do not eat or drink after midnight the night prior to procedure  Dx:severe MVR

## 2019-09-13 ENCOUNTER — TELEPHONE (OUTPATIENT)
Dept: CARDIOLOGY CLINIC | Age: 79
End: 2019-09-13

## 2019-09-13 NOTE — TELEPHONE ENCOUNTER
Pt called he states someone was supposed to call and schedule a test for him. He is unsure what the test is. Pt seen [facility] 09/03/19.

## 2019-09-23 RX ORDER — AMITRIPTYLINE HYDROCHLORIDE 10 MG/1
TABLET, FILM COATED ORAL
Qty: 90 TABLET | Refills: 1 | Status: SHIPPED | OUTPATIENT
Start: 2019-09-23 | End: 2020-04-10

## 2019-09-27 ENCOUNTER — TELEPHONE (OUTPATIENT)
Dept: RHEUMATOLOGY | Age: 79
End: 2019-09-27

## 2019-09-27 NOTE — TELEPHONE ENCOUNTER
PA submitted for Amitriptyline HCl 10MG tablets on CMM  Key: [id number] - PA Case ID: [id number] - Rx #: [id number]     Pending review

## 2019-10-07 ENCOUNTER — HOSPITAL ENCOUNTER (OUTPATIENT)
Dept: CARDIAC CATH/INVASIVE PROCEDURES | Age: 79
Discharge: HOME | End: 2019-10-07
Attending: INTERNAL MEDICINE | Admitting: INTERNAL MEDICINE
Payer: MEDICARE

## 2019-10-07 VITALS
RESPIRATION RATE: 18 BRPM | TEMPERATURE: 97.8 F | OXYGEN SATURATION: 98 % | WEIGHT: 160 LBS | DIASTOLIC BLOOD PRESSURE: 76 MMHG | HEART RATE: 67 BPM | SYSTOLIC BLOOD PRESSURE: 134 MMHG | HEIGHT: 68 IN | BODY MASS INDEX: 24.25 KG/M2

## 2019-10-07 LAB
GFR AFRICAN AMERICAN: 55
GFR NON-AFRICAN AMERICAN: 45
GLUCOSE BLD-MCNC: 95 MG/DL (ref 70–99)
LV EF: 58 %
LVEF MODALITY: NORMAL
PERFORMED ON: ABNORMAL
POC BUN: 18 MG/DL (ref 7–18)
POC CREATININE: 1.5 MG/DL (ref 0.8–1.3)
POC HEMATOCRIT: 41 % (ref 40.5–52.5)
POC POTASSIUM: 4 MMOL/L (ref 3.5–5.1)
POC SAMPLE TYPE: ABNORMAL
POC SODIUM: 143 MMOL/L (ref 136–145)

## 2019-10-07 PROCEDURE — 85014 HEMATOCRIT: CPT

## 2019-10-07 PROCEDURE — 93325 DOPPLER ECHO COLOR FLOW MAPG: CPT

## 2019-10-07 PROCEDURE — 82947 ASSAY GLUCOSE BLOOD QUANT: CPT

## 2019-10-07 PROCEDURE — 93320 DOPPLER ECHO COMPLETE: CPT

## 2019-10-07 PROCEDURE — 93312 ECHO TRANSESOPHAGEAL: CPT

## 2019-10-07 PROCEDURE — 84132 ASSAY OF SERUM POTASSIUM: CPT

## 2019-10-07 PROCEDURE — 84520 ASSAY OF UREA NITROGEN: CPT

## 2019-10-07 PROCEDURE — 7100000010 HC PHASE II RECOVERY - FIRST 15 MIN

## 2019-10-07 PROCEDURE — 99152 MOD SED SAME PHYS/QHP 5/>YRS: CPT

## 2019-10-07 PROCEDURE — 84295 ASSAY OF SERUM SODIUM: CPT

## 2019-10-07 PROCEDURE — 82565 ASSAY OF CREATININE: CPT

## 2019-10-09 ENCOUNTER — TELEPHONE (OUTPATIENT)
Dept: CARDIOLOGY CLINIC | Age: 79
End: 2019-10-09

## 2019-10-09 DIAGNOSIS — I34.0 SEVERE MITRAL REGURGITATION: Primary | ICD-10-CM

## 2019-10-10 ENCOUNTER — TELEPHONE (OUTPATIENT)
Dept: CARDIOLOGY CLINIC | Age: 79
End: 2019-10-10

## 2020-01-07 ENCOUNTER — OFFICE VISIT (OUTPATIENT)
Dept: FAMILY MEDICINE CLINIC | Age: 80
End: 2020-01-07
Payer: MEDICARE

## 2020-01-07 VITALS
WEIGHT: 167 LBS | OXYGEN SATURATION: 94 % | SYSTOLIC BLOOD PRESSURE: 114 MMHG | BODY MASS INDEX: 25.39 KG/M2 | DIASTOLIC BLOOD PRESSURE: 80 MMHG | HEART RATE: 64 BPM

## 2020-01-07 PROCEDURE — 1123F ACP DISCUSS/DSCN MKR DOCD: CPT | Performed by: FAMILY MEDICINE

## 2020-01-07 PROCEDURE — 1036F TOBACCO NON-USER: CPT | Performed by: FAMILY MEDICINE

## 2020-01-07 PROCEDURE — G8417 CALC BMI ABV UP PARAM F/U: HCPCS | Performed by: FAMILY MEDICINE

## 2020-01-07 PROCEDURE — G8482 FLU IMMUNIZE ORDER/ADMIN: HCPCS | Performed by: FAMILY MEDICINE

## 2020-01-07 PROCEDURE — 4040F PNEUMOC VAC/ADMIN/RCVD: CPT | Performed by: FAMILY MEDICINE

## 2020-01-07 PROCEDURE — 99214 OFFICE O/P EST MOD 30 MIN: CPT | Performed by: FAMILY MEDICINE

## 2020-01-07 PROCEDURE — G8427 DOCREV CUR MEDS BY ELIG CLIN: HCPCS | Performed by: FAMILY MEDICINE

## 2020-01-07 ASSESSMENT — ENCOUNTER SYMPTOMS
RHINORRHEA: 1
CONSTIPATION: 1
BACK PAIN: 0
SHORTNESS OF BREATH: 1

## 2020-01-07 NOTE — PROGRESS NOTES
normal electrolytes are normal.  He does have an elevated creatinine at 1.41 but this appears stable. Patient's medications, allergies, past medical,surgical, social and family histories were reviewed and updated as appropriate. Past Medical History:   Diagnosis Date    Acute, but ill-defined, cerebrovascular disease     Cancer ([facility] [address]     skin cancer of scalp - basal    Degeneration of cervical intervertebral disc     Degeneration of lumbar or lumbosacral intervertebral disc     Enlarged prostate     Hypoglycemia, unspecified     Irritable bowel syndrome     Migraine, unspecified, without mention of intractable migraine without mention of status migrainosus     Proteinuria     Pure hypercholesterolemia     Unspecified cerebral artery occlusion with cerebral infarction 2011    bleed      Past Surgical History:   Procedure Laterality Date    BACK SURGERY      CARDIAC CATHETERIZATION      attempted stent, but wouldn't hold     Family History   Problem Relation Age of Onset    Diabetes Mother     Heart Disease Mother     Stroke Brother     High Cholesterol Neg Hx     High Blood Pressure Neg Hx      Social History     Tobacco Use    Smoking status: Never Smoker    Smokeless tobacco: Never Used   Substance Use Topics    Alcohol use: No      Allergies   Allergen Reactions    Antihistamine [Diphenhydramine Hcl]      Unable to urinate due to enlarged prostate    Antihistamines, Chlorpheniramine-Type     Aspirin Other (See Comments)     325mg only. Hematuria.      Diphenhydramine      Current Outpatient Medications on File Prior to Visit   Medication Sig Dispense Refill    lovastatin (MEVACOR) 10 MG tablet TAKE ONE TABLET BY MOUTH ONCE NIGHTLY 90 tablet 0    amitriptyline (ELAVIL) 10 MG tablet TAKE ONE TABLET BY MOUTH ONCE NIGHTLY 90 tablet 1    MULTIPLE VITAMIN PO Take by mouth      amiodarone (CORDARONE) 200 MG tablet TAKE ONE-HALF TABLET BY MOUTH DAILY 45 tablet 3    latanoprost (XALATAN) 0.005 % ophthalmic solution       betamethasone valerate (VALISONE) 0.1 % cream APPLY TOPICALLY TWO TIMES A DAY 1 Tube 1    carvedilol (COREG) 25 MG tablet Take 0.5 tablets by mouth 2 times daily 180 tablet 2    Cholecalciferol (VITAMIN D3) 2000 UNITS CAPS Take 1 capsule by mouth daily      polyethylene glycol (GLYCOLAX) packet Take 17 g by mouth every 48 hours as needed       aspirin 81 MG EC tablet Take 81 mg by mouth daily. No current facility-administered medications on file prior to visit. Review of Systems   HENT: Positive for congestion, postnasal drip and rhinorrhea. Respiratory: Positive for shortness of breath ( with any exertion). Cardiovascular: Negative for chest pain, palpitations and leg swelling. Gastrointestinal: Positive for constipation ( using Miralax qod). Genitourinary: Positive for difficulty urinating (BPH). Musculoskeletal: Negative for arthralgias and back pain ( flares with lifting). Allergic/Immunologic: Positive for environmental allergies. Neurological: Negative for dizziness and light-headedness. Psychiatric/Behavioral: Negative for sleep disturbance. The patient is not nervous/anxious. OBJECTIVE:    /80   Pulse 64   Wt 167 lb (75.8 kg)   SpO2 94%   BMI 25.39 kg/m²    Physical Exam  Constitutional:       General: He is not in acute distress. Appearance: Normal appearance. HENT:      Head: Normocephalic and atraumatic. Right Ear: Tympanic membrane and external ear normal.      Left Ear: Tympanic membrane and external ear normal.   Neck:      Musculoskeletal: Normal range of motion and neck supple. No neck rigidity or muscular tenderness. Cardiovascular:      Rate and Rhythm: Normal rate and regular rhythm. Heart sounds: Murmur present. Systolic murmur present with a grade of 2/6. Pulmonary:      Effort: Pulmonary effort is normal.      Breath sounds: Normal breath sounds.    Musculoskeletal:      Right lower leg: No edema. Left lower leg: No edema. Lymphadenopathy:      Cervical: No cervical adenopathy. Skin:     General: Skin is warm and dry. Neurological:      General: No focal deficit present. Mental Status: He is alert and oriented to person, place, and time. Cranial Nerves: No cranial nerve deficit. Motor: No weakness. Gait: Gait normal.   Psychiatric:         Mood and Affect: Mood normal.         Behavior: Behavior normal.         ASSESSMENT/PLAN:    Ty Palafox was seen today for follow-up. Diagnoses and all orders for this visit:    Mixed hyperlipidemia  -     Comprehensive Metabolic Panel, Fasting; Future  -     CBC Auto Differential; Future  -     Lipid, Fasting; Future    CKD (chronic kidney disease), stage III (HCC)  Stable    Degeneration of lumbar or lumbosacral intervertebral disc  Chronic back pain in fairly good control. Continues to take amitriptyline 10 mg at at bedtime    Coronary artery disease involving native heart without angina pectoris, unspecified vessel or lesion type    PVC (premature ventricular contraction)    Ischemic cardiomyopathy    Mitral valve insufficiency, unspecified etiology    Nonrheumatic aortic valve insufficiency  Patient continues to see cardiology. He plans to have mitral valve replacement and possible aortic valve replacement in the near future by Dr. Pearl Hoskins with [facility].      Return in about 6 months (around 7/7/2020). Please note portions of this note were completed with a voicerecognition program.  Efforts were made to edit the dictations but occasionally words are mis-transcribed.

## 2020-02-10 RX ORDER — AMIODARONE HYDROCHLORIDE 200 MG/1
TABLET ORAL
Qty: 45 TABLET | Refills: 3 | Status: SHIPPED | OUTPATIENT
Start: 2020-02-10 | End: 2020-07-07

## 2020-03-10 ENCOUNTER — HOSPITAL ENCOUNTER (OUTPATIENT)
Dept: CARDIAC REHAB | Age: 80
Setting detail: THERAPIES SERIES
Discharge: HOME | End: 2020-03-10
Payer: MEDICARE

## 2020-03-10 VITALS
HEART RATE: 73 BPM | RESPIRATION RATE: 16 BRPM | HEIGHT: 68 IN | DIASTOLIC BLOOD PRESSURE: 76 MMHG | WEIGHT: 162.13 LBS | SYSTOLIC BLOOD PRESSURE: 124 MMHG | BODY MASS INDEX: 24.57 KG/M2

## 2020-03-10 PROCEDURE — 93798 PHYS/QHP OP CAR RHAB W/ECG: CPT

## 2020-03-10 RX ORDER — WARFARIN SODIUM 2.5 MG/1
2.5 TABLET ORAL DAILY
COMMUNITY
End: 2020-07-07

## 2020-03-10 RX ORDER — TAMSULOSIN HYDROCHLORIDE 0.4 MG/1
0.4 CAPSULE ORAL DAILY
COMMUNITY

## 2020-03-10 RX ORDER — METOPROLOL SUCCINATE 25 MG/1
25 TABLET, EXTENDED RELEASE ORAL DAILY
COMMUNITY
End: 2020-03-11 | Stop reason: SDUPTHER

## 2020-03-10 ASSESSMENT — PATIENT HEALTH QUESTIONNAIRE - PHQ9: SUM OF ALL RESPONSES TO PHQ QUESTIONS 1-9: 2

## 2020-03-10 NOTE — PROGRESS NOTES
with pt. And wife and verified by pt. Plan of care was reviewed and goal was set by pt. And agreed upon. Pt. Was given education on the RPE scale as well as signs and symptoms to report to staff. Pt. Completed his 6 minute walk test as well. Pt. To attend the 10:00 AM class time.       COBY Pacheco RN  3/10/2020

## 2020-03-11 RX ORDER — METOPROLOL SUCCINATE 25 MG/1
25 TABLET, EXTENDED RELEASE ORAL DAILY
Qty: 90 TABLET | Refills: 1 | Status: SHIPPED | OUTPATIENT
Start: 2020-03-11 | End: 2020-09-08

## 2020-03-17 RX ORDER — LOVASTATIN 10 MG/1
TABLET ORAL
Qty: 90 TABLET | Refills: 1 | Status: SHIPPED | OUTPATIENT
Start: 2020-03-17 | End: 2020-09-10

## 2020-04-10 RX ORDER — AMITRIPTYLINE HYDROCHLORIDE 10 MG/1
TABLET, FILM COATED ORAL
Qty: 90 TABLET | Refills: 1 | Status: SHIPPED | OUTPATIENT
Start: 2020-04-10 | End: 2020-10-08

## 2020-06-08 ENCOUNTER — HOSPITAL ENCOUNTER (OUTPATIENT)
Dept: CARDIAC REHAB | Age: 80
Setting detail: THERAPIES SERIES
Discharge: HOME | End: 2020-06-08
Payer: MEDICARE

## 2020-06-08 PROCEDURE — 93798 PHYS/QHP OP CAR RHAB W/ECG: CPT

## 2020-06-10 ENCOUNTER — HOSPITAL ENCOUNTER (OUTPATIENT)
Dept: CARDIAC REHAB | Age: 80
Setting detail: THERAPIES SERIES
Discharge: HOME | End: 2020-06-10
Payer: MEDICARE

## 2020-06-10 PROCEDURE — 93798 PHYS/QHP OP CAR RHAB W/ECG: CPT

## 2020-06-12 ENCOUNTER — HOSPITAL ENCOUNTER (OUTPATIENT)
Dept: CARDIAC REHAB | Age: 80
Setting detail: THERAPIES SERIES
End: 2020-06-12
Payer: MEDICARE

## 2020-06-15 ENCOUNTER — HOSPITAL ENCOUNTER (OUTPATIENT)
Dept: CARDIAC REHAB | Age: 80
Setting detail: THERAPIES SERIES
End: 2020-06-15
Payer: MEDICARE

## 2020-06-17 ENCOUNTER — HOSPITAL ENCOUNTER (OUTPATIENT)
Dept: CARDIAC REHAB | Age: 80
Setting detail: THERAPIES SERIES
Discharge: HOME | End: 2020-06-17
Payer: MEDICARE

## 2020-06-17 PROCEDURE — 93798 PHYS/QHP OP CAR RHAB W/ECG: CPT

## 2020-06-19 ENCOUNTER — HOSPITAL ENCOUNTER (OUTPATIENT)
Dept: CARDIAC REHAB | Age: 80
Setting detail: THERAPIES SERIES
Discharge: HOME | End: 2020-06-19
Payer: MEDICARE

## 2020-06-19 PROCEDURE — 93798 PHYS/QHP OP CAR RHAB W/ECG: CPT

## 2020-06-22 ENCOUNTER — HOSPITAL ENCOUNTER (OUTPATIENT)
Dept: CARDIAC REHAB | Age: 80
Setting detail: THERAPIES SERIES
End: 2020-06-22
Payer: MEDICARE

## 2020-06-24 ENCOUNTER — HOSPITAL ENCOUNTER (OUTPATIENT)
Dept: CARDIAC REHAB | Age: 80
Setting detail: THERAPIES SERIES
Discharge: HOME | End: 2020-06-24
Payer: MEDICARE

## 2020-06-24 PROCEDURE — 93798 PHYS/QHP OP CAR RHAB W/ECG: CPT

## 2020-06-26 ENCOUNTER — HOSPITAL ENCOUNTER (OUTPATIENT)
Dept: CARDIAC REHAB | Age: 80
Setting detail: THERAPIES SERIES
Discharge: HOME | End: 2020-06-26
Payer: MEDICARE

## 2020-06-26 PROCEDURE — 93798 PHYS/QHP OP CAR RHAB W/ECG: CPT

## 2020-06-29 ENCOUNTER — HOSPITAL ENCOUNTER (OUTPATIENT)
Dept: CARDIAC REHAB | Age: 80
Setting detail: THERAPIES SERIES
Discharge: HOME | End: 2020-06-29
Payer: MEDICARE

## 2020-06-29 PROCEDURE — 93798 PHYS/QHP OP CAR RHAB W/ECG: CPT

## 2020-07-01 ENCOUNTER — HOSPITAL ENCOUNTER (OUTPATIENT)
Dept: CARDIAC REHAB | Age: 80
Setting detail: THERAPIES SERIES
End: 2020-07-01
Payer: MEDICARE

## 2020-07-03 ENCOUNTER — APPOINTMENT (OUTPATIENT)
Dept: CARDIAC REHAB | Age: 80
End: 2020-07-03
Payer: MEDICARE

## 2020-07-05 NOTE — PROGRESS NOTES
SUBJECTIVE:    Marianna Blount is a 70 y.o. male who presents for a follow up visit. Chief Complaint   Patient presents with    Follow-up     Patient is here for a follow up. Discuss labs. No new concerns. Benign Prostatic Hypertrophy   This is a chronic problem. The current episode started more than 1 year ago. The problem has been gradually improving since onset. Irritative symptoms include frequency. Obstructive symptoms include an intermittent stream. Obstructive symptoms do not include dribbling. Nothing aggravates the symptoms. Past treatments include tamsulosin. The treatment provided moderate relief. He has been using treatment for 2 or more years. Hyperlipidemia   This is a chronic problem. The current episode started more than 1 year ago. The problem is controlled. Lipid results: Total cholesterol 152, triglycerides 190, HDL 40, LDL 74. Exacerbating diseases include chronic renal disease. Factors aggravating his hyperlipidemia include beta blockers. Pertinent negatives include no chest pain or shortness of breath. Current antihyperlipidemic treatment includes statins. The current treatment provides significant improvement of lipids. Compliance problems include adherence to exercise and adherence to diet. Risk factors for coronary artery disease include dyslipidemia, a sedentary lifestyle and male sex. Hypertension   This is a chronic problem. The current episode started more than 1 year ago. The problem is controlled. Pertinent negatives include no chest pain, palpitations or shortness of breath. Risk factors for coronary artery disease include dyslipidemia, male gender and sedentary lifestyle. Past treatments include beta blockers. Identifiable causes of hypertension include chronic renal disease. Cerumen impaction  This is a recurrent problem. He has noticed decreasing hearing over the past few months  Skin lesions  This is a chronic problem.   Patient notes to raised areas 1 on the right forearm the other on his right chest wall have been present for a few months. He would like to have them removed      Patient's medications, allergies, past medical,surgical, social and family histories were reviewed and updated as appropriate. Past Medical History:   Diagnosis Date    Acute, but ill-defined, cerebrovascular disease     Cancer ([address]     skin cancer of scalp - basal    Degeneration of cervical intervertebral disc     Degeneration of lumbar or lumbosacral intervertebral disc     Enlarged prostate     Irritable bowel syndrome     Proteinuria     Pure hypercholesterolemia     Unspecified cerebral artery occlusion with cerebral infarction 2011    bleed      Past Surgical History:   Procedure Laterality Date    AORTIC MITRAL VALVE REPLACEMENT      BACK SURGERY      CARDIAC CATHETERIZATION      attempted stent, but wouldn't hold     Family History   Problem Relation Age of Onset    Diabetes Mother     Heart Disease Mother     Stroke Brother     High Cholesterol Neg Hx     High Blood Pressure Neg Hx      Social History     Tobacco Use    Smoking status: Never Smoker    Smokeless tobacco: Never Used   Substance Use Topics    Alcohol use: No      Allergies   Allergen Reactions    Antihistamine [Diphenhydramine Hcl]      Unable to urinate due to enlarged prostate    Antihistamines, Chlorpheniramine-Type     Aspirin Other (See Comments)     325mg only. Hematuria.      Diphenhydramine      Current Outpatient Medications on File Prior to Visit   Medication Sig Dispense Refill    Netarsudil-Latanoprost (ROCKLATAN) 0.02-0.005 % SOLN Apply to eye      amitriptyline (ELAVIL) 10 MG tablet TAKE ONE TABLET BY MOUTH ONCE NIGHTLY 90 tablet 1    lovastatin (MEVACOR) 10 MG tablet TAKE ONE TABLET BY MOUTH ONCE NIGHTLY 90 tablet 1    metoprolol succinate (TOPROL XL) 25 MG extended release tablet Take 1 tablet by mouth daily 90 tablet 1    tamsulosin (FLOMAX) 0.4 MG capsule Take 0.4 mg thyromegaly. Vascular: No JVD. Trachea: No tracheal deviation. Cardiovascular:      Rate and Rhythm: Normal rate and regular rhythm. Heart sounds: Normal heart sounds. Pulmonary:      Effort: Pulmonary effort is normal. No respiratory distress. Breath sounds: Normal breath sounds. No rales. Lymphadenopathy:      Cervical: No cervical adenopathy. Skin:     General: Skin is warm and dry. Neurological:      Mental Status: He is alert and oriented to person, place, and time. ASSESSMENT/PLAN:    Hunter Macario was seen today for follow-up. Diagnoses and all orders for this visit:    Mixed hyperlipidemia  -     Comprehensive Metabolic Panel, Fasting; Future  -     CBC Auto Differential; Future  -     Lipid, Fasting; Future  -     TSH with Reflex; Future    Mitral valve insufficiency, unspecified etiology  Cardiology following    Ischemic cardiomyopathy  Doing well followed by cardiology    Coronary artery disease involving native heart without angina pectoris, unspecified vessel or lesion type  Followed by cardiology    Benign prostatic hyperplasia without lower urinary tract symptoms  Doing well    Essential hypertension  Excellent control on current medication    Gastroesophageal reflux disease without esophagitis  Stable    Bilateral impacted cerumen  Procedure:  Bilateral ear irrigation is accomplished using a WaterPik. Procedure done by Bernabe Rey LPN. Patient tolerated the procedure well without complication. Excellent results obtained    Actinic keratoses  Procedure:  Using liquid nitrogen the 2 above-mentioned lesions were frozen  Cryotherapy Procedure Note    Pre-operative Diagnosis:actinic keratoses 2    Post-operative Diagnosis: same    Locations:trunk and right forearm    Procedure Details   3 cycles of liquid nitrogen applied to affected sites. Complications: none. Plan:  1.  Patient was educated regarding the potential risks of blister formation,

## 2020-07-06 ENCOUNTER — HOSPITAL ENCOUNTER (OUTPATIENT)
Dept: CARDIAC REHAB | Age: 80
Setting detail: THERAPIES SERIES
Discharge: HOME | End: 2020-07-06
Payer: MEDICARE

## 2020-07-06 PROCEDURE — 93798 PHYS/QHP OP CAR RHAB W/ECG: CPT

## 2020-07-07 ENCOUNTER — OFFICE VISIT (OUTPATIENT)
Dept: FAMILY MEDICINE CLINIC | Age: 80
End: 2020-07-07
Payer: MEDICARE

## 2020-07-07 VITALS
HEART RATE: 78 BPM | SYSTOLIC BLOOD PRESSURE: 100 MMHG | WEIGHT: 165 LBS | OXYGEN SATURATION: 98 % | BODY MASS INDEX: 25.09 KG/M2 | DIASTOLIC BLOOD PRESSURE: 80 MMHG

## 2020-07-07 PROCEDURE — 17003 DESTRUCT PREMALG LES 2-14: CPT | Performed by: FAMILY MEDICINE

## 2020-07-07 PROCEDURE — 1123F ACP DISCUSS/DSCN MKR DOCD: CPT | Performed by: FAMILY MEDICINE

## 2020-07-07 PROCEDURE — 99214 OFFICE O/P EST MOD 30 MIN: CPT | Performed by: FAMILY MEDICINE

## 2020-07-07 PROCEDURE — 4040F PNEUMOC VAC/ADMIN/RCVD: CPT | Performed by: FAMILY MEDICINE

## 2020-07-07 PROCEDURE — 1036F TOBACCO NON-USER: CPT | Performed by: FAMILY MEDICINE

## 2020-07-07 PROCEDURE — G8427 DOCREV CUR MEDS BY ELIG CLIN: HCPCS | Performed by: FAMILY MEDICINE

## 2020-07-07 PROCEDURE — 17000 DESTRUCT PREMALG LESION: CPT | Performed by: FAMILY MEDICINE

## 2020-07-07 PROCEDURE — G8417 CALC BMI ABV UP PARAM F/U: HCPCS | Performed by: FAMILY MEDICINE

## 2020-07-07 RX ORDER — NETARSUDIL AND LATANOPROST OPHTHALMIC SOLUTION, 0.02%/0.005% .2; .05 MG/ML; MG/ML
1 SOLUTION/ DROPS OPHTHALMIC; TOPICAL DAILY
COMMUNITY

## 2020-07-07 ASSESSMENT — ENCOUNTER SYMPTOMS
DIARRHEA: 0
SHORTNESS OF BREATH: 0
WHEEZING: 0
RHINORRHEA: 0
CONSTIPATION: 1
CHEST TIGHTNESS: 0
BACK PAIN: 0

## 2020-07-07 NOTE — PROGRESS NOTES
Ear Irrigation Procedure Note    Ear irrigation procedure was performed using a WaterPik to the both ear(s) for cerumen impaction. The remaining wax and debris was removed with curette  instrumentation. The procedure was successful.   The patient had no complications

## 2020-07-08 ENCOUNTER — HOSPITAL ENCOUNTER (OUTPATIENT)
Dept: CARDIAC REHAB | Age: 80
Setting detail: THERAPIES SERIES
Discharge: HOME | End: 2020-07-08
Payer: MEDICARE

## 2020-07-08 PROCEDURE — 93798 PHYS/QHP OP CAR RHAB W/ECG: CPT

## 2020-07-10 ENCOUNTER — HOSPITAL ENCOUNTER (OUTPATIENT)
Dept: CARDIAC REHAB | Age: 80
Setting detail: THERAPIES SERIES
Discharge: HOME | End: 2020-07-10
Payer: MEDICARE

## 2020-07-10 PROCEDURE — 93798 PHYS/QHP OP CAR RHAB W/ECG: CPT

## 2020-07-13 ENCOUNTER — HOSPITAL ENCOUNTER (OUTPATIENT)
Dept: CARDIAC REHAB | Age: 80
Setting detail: THERAPIES SERIES
Discharge: HOME | End: 2020-07-13
Payer: MEDICARE

## 2020-07-13 PROCEDURE — 93798 PHYS/QHP OP CAR RHAB W/ECG: CPT

## 2020-07-15 ENCOUNTER — HOSPITAL ENCOUNTER (OUTPATIENT)
Dept: CARDIAC REHAB | Age: 80
Setting detail: THERAPIES SERIES
Discharge: HOME | End: 2020-07-15
Payer: MEDICARE

## 2020-07-15 PROCEDURE — 93798 PHYS/QHP OP CAR RHAB W/ECG: CPT

## 2020-07-17 ENCOUNTER — HOSPITAL ENCOUNTER (OUTPATIENT)
Dept: CARDIAC REHAB | Age: 80
Setting detail: THERAPIES SERIES
Discharge: HOME | End: 2020-07-17
Payer: MEDICARE

## 2020-07-17 PROCEDURE — 93798 PHYS/QHP OP CAR RHAB W/ECG: CPT

## 2020-07-20 ENCOUNTER — HOSPITAL ENCOUNTER (OUTPATIENT)
Dept: CARDIAC REHAB | Age: 80
Setting detail: THERAPIES SERIES
Discharge: HOME | End: 2020-07-20
Payer: MEDICARE

## 2020-07-20 PROCEDURE — 93798 PHYS/QHP OP CAR RHAB W/ECG: CPT

## 2020-07-22 ENCOUNTER — HOSPITAL ENCOUNTER (OUTPATIENT)
Dept: CARDIAC REHAB | Age: 80
Setting detail: THERAPIES SERIES
Discharge: HOME | End: 2020-07-22
Payer: MEDICARE

## 2020-07-24 ENCOUNTER — APPOINTMENT (OUTPATIENT)
Dept: CARDIAC REHAB | Age: 80
End: 2020-07-24
Payer: MEDICARE

## 2020-07-27 ENCOUNTER — HOSPITAL ENCOUNTER (OUTPATIENT)
Dept: CARDIAC REHAB | Age: 80
Setting detail: THERAPIES SERIES
Discharge: HOME | End: 2020-07-27
Payer: MEDICARE

## 2020-07-27 PROCEDURE — 93798 PHYS/QHP OP CAR RHAB W/ECG: CPT

## 2020-07-29 ENCOUNTER — HOSPITAL ENCOUNTER (OUTPATIENT)
Dept: CARDIAC REHAB | Age: 80
Setting detail: THERAPIES SERIES
Discharge: HOME | End: 2020-07-29
Payer: MEDICARE

## 2020-07-29 PROCEDURE — 93798 PHYS/QHP OP CAR RHAB W/ECG: CPT

## 2020-07-31 ENCOUNTER — HOSPITAL ENCOUNTER (OUTPATIENT)
Dept: CARDIAC REHAB | Age: 80
Setting detail: THERAPIES SERIES
Discharge: HOME | End: 2020-07-31
Payer: MEDICARE

## 2020-07-31 PROCEDURE — 93798 PHYS/QHP OP CAR RHAB W/ECG: CPT

## 2020-08-03 ENCOUNTER — HOSPITAL ENCOUNTER (OUTPATIENT)
Dept: CARDIAC REHAB | Age: 80
Setting detail: THERAPIES SERIES
Discharge: HOME | End: 2020-08-03
Payer: MEDICARE

## 2020-08-03 PROCEDURE — 93798 PHYS/QHP OP CAR RHAB W/ECG: CPT

## 2020-08-05 ENCOUNTER — HOSPITAL ENCOUNTER (OUTPATIENT)
Dept: CARDIAC REHAB | Age: 80
Setting detail: THERAPIES SERIES
End: 2020-08-05
Payer: MEDICARE

## 2020-08-07 ENCOUNTER — HOSPITAL ENCOUNTER (OUTPATIENT)
Dept: CARDIAC REHAB | Age: 80
Setting detail: THERAPIES SERIES
Discharge: HOME | End: 2020-08-07
Payer: MEDICARE

## 2020-08-07 PROCEDURE — 93798 PHYS/QHP OP CAR RHAB W/ECG: CPT

## 2020-08-10 ENCOUNTER — HOSPITAL ENCOUNTER (OUTPATIENT)
Dept: CARDIAC REHAB | Age: 80
Setting detail: THERAPIES SERIES
Discharge: HOME | End: 2020-08-10
Payer: MEDICARE

## 2020-08-10 PROCEDURE — 93798 PHYS/QHP OP CAR RHAB W/ECG: CPT

## 2020-08-12 ENCOUNTER — HOSPITAL ENCOUNTER (OUTPATIENT)
Dept: CARDIAC REHAB | Age: 80
Setting detail: THERAPIES SERIES
Discharge: HOME | End: 2020-08-12
Payer: MEDICARE

## 2020-08-12 PROCEDURE — 93798 PHYS/QHP OP CAR RHAB W/ECG: CPT

## 2020-08-14 ENCOUNTER — APPOINTMENT (OUTPATIENT)
Dept: CARDIAC REHAB | Age: 80
End: 2020-08-14
Payer: MEDICARE

## 2020-08-17 ENCOUNTER — HOSPITAL ENCOUNTER (OUTPATIENT)
Dept: CARDIAC REHAB | Age: 80
Setting detail: THERAPIES SERIES
Discharge: HOME | End: 2020-08-17
Payer: MEDICARE

## 2020-08-17 PROCEDURE — 93798 PHYS/QHP OP CAR RHAB W/ECG: CPT

## 2020-08-19 ENCOUNTER — HOSPITAL ENCOUNTER (OUTPATIENT)
Dept: CARDIAC REHAB | Age: 80
Setting detail: THERAPIES SERIES
Discharge: HOME | End: 2020-08-19
Payer: MEDICARE

## 2020-08-19 PROCEDURE — 93798 PHYS/QHP OP CAR RHAB W/ECG: CPT

## 2020-08-21 ENCOUNTER — HOSPITAL ENCOUNTER (OUTPATIENT)
Dept: CARDIAC REHAB | Age: 80
Setting detail: THERAPIES SERIES
Discharge: HOME | End: 2020-08-21
Payer: MEDICARE

## 2020-08-21 PROCEDURE — 93798 PHYS/QHP OP CAR RHAB W/ECG: CPT

## 2020-08-24 ENCOUNTER — HOSPITAL ENCOUNTER (OUTPATIENT)
Dept: CARDIAC REHAB | Age: 80
Setting detail: THERAPIES SERIES
Discharge: HOME | End: 2020-08-24
Payer: MEDICARE

## 2020-08-24 PROCEDURE — 93798 PHYS/QHP OP CAR RHAB W/ECG: CPT

## 2020-08-26 ENCOUNTER — HOSPITAL ENCOUNTER (OUTPATIENT)
Dept: CARDIAC REHAB | Age: 80
Setting detail: THERAPIES SERIES
Discharge: HOME | End: 2020-08-26
Payer: MEDICARE

## 2020-08-26 PROCEDURE — 93798 PHYS/QHP OP CAR RHAB W/ECG: CPT

## 2020-08-28 ENCOUNTER — APPOINTMENT (OUTPATIENT)
Dept: CARDIAC REHAB | Age: 80
End: 2020-08-28
Payer: MEDICARE

## 2020-08-31 ENCOUNTER — HOSPITAL ENCOUNTER (OUTPATIENT)
Dept: CARDIAC REHAB | Age: 80
Setting detail: THERAPIES SERIES
Discharge: HOME | End: 2020-08-31
Payer: MEDICARE

## 2020-08-31 PROCEDURE — 93798 PHYS/QHP OP CAR RHAB W/ECG: CPT

## 2020-09-02 ENCOUNTER — HOSPITAL ENCOUNTER (OUTPATIENT)
Dept: CARDIAC REHAB | Age: 80
Setting detail: THERAPIES SERIES
Discharge: HOME | End: 2020-09-02
Payer: MEDICARE

## 2020-09-02 PROCEDURE — 93798 PHYS/QHP OP CAR RHAB W/ECG: CPT

## 2020-09-04 ENCOUNTER — HOSPITAL ENCOUNTER (OUTPATIENT)
Dept: CARDIAC REHAB | Age: 80
Setting detail: THERAPIES SERIES
Discharge: HOME | End: 2020-09-04
Payer: MEDICARE

## 2020-09-04 PROCEDURE — 93798 PHYS/QHP OP CAR RHAB W/ECG: CPT

## 2020-09-08 RX ORDER — METOPROLOL SUCCINATE 25 MG/1
TABLET, EXTENDED RELEASE ORAL
Qty: 90 TABLET | Refills: 1 | Status: SHIPPED | OUTPATIENT
Start: 2020-09-08

## 2020-09-09 ENCOUNTER — HOSPITAL ENCOUNTER (OUTPATIENT)
Dept: CARDIAC REHAB | Age: 80
Setting detail: THERAPIES SERIES
Discharge: HOME | End: 2020-09-09
Payer: MEDICARE

## 2020-09-09 PROCEDURE — 93798 PHYS/QHP OP CAR RHAB W/ECG: CPT

## 2020-09-10 RX ORDER — LOVASTATIN 10 MG/1
TABLET ORAL
Qty: 90 TABLET | Refills: 3 | Status: SHIPPED | OUTPATIENT
Start: 2020-09-10 | End: 2020-09-14

## 2020-09-11 ENCOUNTER — NURSE ONLY (OUTPATIENT)
Dept: FAMILY MEDICINE CLINIC | Age: 80
End: 2020-09-11
Payer: MEDICARE

## 2020-09-11 ENCOUNTER — HOSPITAL ENCOUNTER (OUTPATIENT)
Dept: CARDIAC REHAB | Age: 80
Setting detail: THERAPIES SERIES
Discharge: HOME | End: 2020-09-11
Payer: MEDICARE

## 2020-09-11 PROCEDURE — 90694 VACC AIIV4 NO PRSRV 0.5ML IM: CPT | Performed by: FAMILY MEDICINE

## 2020-09-11 PROCEDURE — G0008 ADMIN INFLUENZA VIRUS VAC: HCPCS | Performed by: FAMILY MEDICINE

## 2020-09-11 PROCEDURE — 93798 PHYS/QHP OP CAR RHAB W/ECG: CPT

## 2020-09-14 ENCOUNTER — HOSPITAL ENCOUNTER (OUTPATIENT)
Dept: CARDIAC REHAB | Age: 80
Setting detail: THERAPIES SERIES
Discharge: HOME | End: 2020-09-14
Payer: MEDICARE

## 2020-09-14 PROCEDURE — 93798 PHYS/QHP OP CAR RHAB W/ECG: CPT

## 2020-09-14 RX ORDER — LOVASTATIN 10 MG/1
TABLET ORAL
Qty: 90 TABLET | Refills: 3 | Status: SHIPPED | OUTPATIENT
Start: 2020-09-14 | End: 2021-07-07

## 2020-09-16 ENCOUNTER — HOSPITAL ENCOUNTER (OUTPATIENT)
Dept: CARDIAC REHAB | Age: 80
Setting detail: THERAPIES SERIES
Discharge: HOME | End: 2020-09-16
Payer: MEDICARE

## 2020-09-16 PROCEDURE — 93798 PHYS/QHP OP CAR RHAB W/ECG: CPT

## 2020-09-18 ENCOUNTER — HOSPITAL ENCOUNTER (OUTPATIENT)
Dept: CARDIAC REHAB | Age: 80
Setting detail: THERAPIES SERIES
Discharge: HOME | End: 2020-09-18
Payer: MEDICARE

## 2020-09-18 PROCEDURE — 93798 PHYS/QHP OP CAR RHAB W/ECG: CPT

## 2020-09-21 ENCOUNTER — HOSPITAL ENCOUNTER (OUTPATIENT)
Dept: CARDIAC REHAB | Age: 80
Setting detail: THERAPIES SERIES
Discharge: HOME | End: 2020-09-21
Payer: MEDICARE

## 2020-09-21 PROCEDURE — 93798 PHYS/QHP OP CAR RHAB W/ECG: CPT

## 2020-09-23 ENCOUNTER — APPOINTMENT (OUTPATIENT)
Dept: CARDIAC REHAB | Age: 80
End: 2020-09-23
Payer: MEDICARE

## 2020-09-25 ENCOUNTER — APPOINTMENT (OUTPATIENT)
Dept: CARDIAC REHAB | Age: 80
End: 2020-09-25
Payer: MEDICARE

## 2020-10-08 RX ORDER — AMITRIPTYLINE HYDROCHLORIDE 10 MG/1
TABLET, FILM COATED ORAL
Qty: 90 TABLET | Refills: 1 | Status: SHIPPED | OUTPATIENT
Start: 2020-10-08 | End: 2021-01-07

## 2021-01-07 ENCOUNTER — OFFICE VISIT (OUTPATIENT)
Dept: FAMILY MEDICINE CLINIC | Age: 81
End: 2021-01-07
Payer: MEDICARE

## 2021-01-07 VITALS
OXYGEN SATURATION: 99 % | TEMPERATURE: 97.5 F | SYSTOLIC BLOOD PRESSURE: 110 MMHG | DIASTOLIC BLOOD PRESSURE: 80 MMHG | WEIGHT: 173 LBS | HEART RATE: 71 BPM | BODY MASS INDEX: 26.3 KG/M2

## 2021-01-07 DIAGNOSIS — K59.00 CONSTIPATION, UNSPECIFIED CONSTIPATION TYPE: ICD-10-CM

## 2021-01-07 DIAGNOSIS — F51.01 PRIMARY INSOMNIA: Primary | ICD-10-CM

## 2021-01-07 DIAGNOSIS — I25.10 CORONARY ARTERY DISEASE INVOLVING NATIVE HEART WITHOUT ANGINA PECTORIS, UNSPECIFIED VESSEL OR LESION TYPE: ICD-10-CM

## 2021-01-07 DIAGNOSIS — N40.0 BENIGN PROSTATIC HYPERPLASIA WITHOUT LOWER URINARY TRACT SYMPTOMS: ICD-10-CM

## 2021-01-07 DIAGNOSIS — E78.2 MIXED HYPERLIPIDEMIA: ICD-10-CM

## 2021-01-07 PROCEDURE — 1123F ACP DISCUSS/DSCN MKR DOCD: CPT | Performed by: FAMILY MEDICINE

## 2021-01-07 PROCEDURE — G8484 FLU IMMUNIZE NO ADMIN: HCPCS | Performed by: FAMILY MEDICINE

## 2021-01-07 PROCEDURE — 1036F TOBACCO NON-USER: CPT | Performed by: FAMILY MEDICINE

## 2021-01-07 PROCEDURE — G8417 CALC BMI ABV UP PARAM F/U: HCPCS | Performed by: FAMILY MEDICINE

## 2021-01-07 PROCEDURE — 99214 OFFICE O/P EST MOD 30 MIN: CPT | Performed by: FAMILY MEDICINE

## 2021-01-07 PROCEDURE — G8427 DOCREV CUR MEDS BY ELIG CLIN: HCPCS | Performed by: FAMILY MEDICINE

## 2021-01-07 PROCEDURE — 4040F PNEUMOC VAC/ADMIN/RCVD: CPT | Performed by: FAMILY MEDICINE

## 2021-01-07 RX ORDER — AMITRIPTYLINE HYDROCHLORIDE 25 MG/1
25 TABLET, FILM COATED ORAL NIGHTLY
Qty: 90 TABLET | Refills: 1 | Status: SHIPPED | OUTPATIENT
Start: 2021-01-07 | End: 2021-07-06

## 2021-01-07 ASSESSMENT — ENCOUNTER SYMPTOMS
DIARRHEA: 0
CONSTIPATION: 1
SHORTNESS OF BREATH: 0
BLOATING: 1
BACK PAIN: 1
CHEST TIGHTNESS: 0
RHINORRHEA: 0
ABDOMINAL PAIN: 0
SINUS PRESSURE: 0

## 2021-01-07 NOTE — PROGRESS NOTES
SUBJECTIVE:    Rohit Hutson is a 68 y.o. male who presents for a follow up visit. Chief Complaint   Patient presents with    Follow-up     Patient is here for a follow up. Discuss lab. Not sleeping well at night. Hyperlipidemia  This is a chronic problem. The current episode started more than 1 year ago. The problem is controlled. Factors aggravating his hyperlipidemia include beta blockers. Pertinent negatives include no chest pain or shortness of breath. Current antihyperlipidemic treatment includes statins. The current treatment provides significant improvement of lipids. Compliance problems include adherence to exercise and adherence to diet. Risk factors for coronary artery disease include dyslipidemia, male sex and a sedentary lifestyle. Benign Prostatic Hypertrophy  This is a chronic problem. The current episode started more than 1 year ago. The problem has been waxing and waning since onset. Irritative symptoms include frequency. Irritative symptoms do not include nocturia. Obstructive symptoms include incomplete emptying. Obstructive symptoms do not include a slower stream or a weak stream. Past treatments include tamsulosin. The treatment provided significant relief. He has been using treatment for 2 or more years. Constipation  This is a chronic problem. The current episode started more than 1 year ago. The patient is on a high fiber diet. He exercises regularly. There has been adequate water intake. Associated symptoms include back pain and bloating. Pertinent negatives include no abdominal pain or diarrhea. Treatments tried: Belem;ax QOD. The treatment provided significant relief. Insomnia  This is a chronic problem. He feels that the amitriptyline 10 mg is no longer effective and is asking for higher dose or a different medication. Since he has tolerated the amitriptyline 10 mg without any problems we will consider increasing the dose. Patient's medications, allergies, past medical,surgical, social and family histories were reviewed and updated as appropriate. Past Medical History:   Diagnosis Date    Acute, but ill-defined, cerebrovascular disease     Cancer ([address]     skin cancer of scalp - basal    Degeneration of cervical intervertebral disc     Degeneration of lumbar or lumbosacral intervertebral disc     Enlarged prostate     Irritable bowel syndrome     Proteinuria     Pure hypercholesterolemia     Unspecified cerebral artery occlusion with cerebral infarction 2011    bleed      Past Surgical History:   Procedure Laterality Date    AORTIC MITRAL VALVE REPLACEMENT      BACK SURGERY      CARDIAC CATHETERIZATION      attempted stent, but wouldn't hold     Family History   Problem Relation Age of Onset    Diabetes Mother     Heart Disease Mother     Stroke Brother     High Cholesterol Neg Hx     High Blood Pressure Neg Hx      Social History     Tobacco Use    Smoking status: Never Smoker    Smokeless tobacco: Never Used   Substance Use Topics    Alcohol use: No      Allergies   Allergen Reactions    Antihistamine [Diphenhydramine Hcl]      Unable to urinate due to enlarged prostate    Antihistamines, Chlorpheniramine-Type     Aspirin Other (See Comments)     325mg only. Hematuria.      Diphenhydramine      Current Outpatient Medications on File Prior to Visit   Medication Sig Dispense Refill    lovastatin (MEVACOR) 10 MG tablet TAKE ONE TABLET BY MOUTH ONCE NIGHTLY 90 tablet 3    metoprolol succinate (TOPROL XL) 25 MG extended release tablet TAKE ONE TABLET BY MOUTH DAILY 90 tablet 1    Omega-3 1400 MG CAPS Take by mouth      Netarsudil-Latanoprost (ROCKLATAN) 0.02-0.005 % SOLN Apply to eye      tamsulosin (FLOMAX) 0.4 MG capsule Take 0.4 mg by mouth daily      MULTIPLE VITAMIN PO Take by mouth      Cholecalciferol (VITAMIN D3) 2000 UNITS CAPS Take 1 capsule by mouth daily  polyethylene glycol (GLYCOLAX) packet Take 17 g by mouth every 48 hours as needed       aspirin 81 MG EC tablet Take 81 mg by mouth daily. No current facility-administered medications on file prior to visit. Review of Systems   Constitutional: Negative for activity change, fatigue and unexpected weight change. HENT: Negative for congestion, postnasal drip, rhinorrhea and sinus pressure. Respiratory: Negative for chest tightness and shortness of breath. Cardiovascular: Negative for chest pain and palpitations. Gastrointestinal: Positive for bloating and constipation. Negative for abdominal pain and diarrhea. Genitourinary: Positive for frequency and incomplete emptying. Negative for nocturia. Musculoskeletal: Positive for arthralgias ( shoulders, hips) and back pain. Neurological: Negative for dizziness and headaches. Psychiatric/Behavioral: Positive for sleep disturbance. Negative for dysphoric mood. The patient is not nervous/anxious. OBJECTIVE:    /80   Pulse 71   Temp 97.5 °F (36.4 °C)   Wt 173 lb (78.5 kg)   SpO2 99%   BMI 26.30 kg/m²    Physical Exam  Constitutional:       Appearance: He is well-developed. HENT:      Head: Normocephalic and atraumatic. Right Ear: Tympanic membrane and external ear normal.      Left Ear: Tympanic membrane and external ear normal.      Nose: Nose normal.   Eyes:      Extraocular Movements: Extraocular movements intact. Conjunctiva/sclera: Conjunctivae normal.   Neck:      Musculoskeletal: Normal range of motion and neck supple. Thyroid: No thyromegaly. Vascular: No JVD. Trachea: No tracheal deviation. Cardiovascular:      Rate and Rhythm: Normal rate and regular rhythm. Heart sounds: Normal heart sounds. Pulmonary:      Effort: Pulmonary effort is normal. No respiratory distress. Breath sounds: Normal breath sounds. No rales. Musculoskeletal:      Right lower leg: No edema. Left lower leg: No edema. Lymphadenopathy:      Cervical: No cervical adenopathy. Skin:     General: Skin is warm and dry. Neurological:      Mental Status: He is alert and oriented to person, place, and time. Psychiatric:         Mood and Affect: Mood normal.         Behavior: Behavior normal.         ASSESSMENT/PLAN:    Arnold Mortensen was seen today for follow-up. Diagnoses and all orders for this visit:    Primary insomnia  -     amitriptyline (ELAVIL) 25 MG tablet; Take 1 tablet by mouth nightly    Mixed hyperlipidemia  -     Comprehensive Metabolic Panel, Fasting; Future  -     CBC Auto Differential; Future  -     Lipid, Fasting; Future    Coronary artery disease involving native heart without angina pectoris, unspecified vessel or lesion type  Followed by cardiology    Benign prostatic hyperplasia without lower urinary tract symptoms  Symptoms are in good control at this time. Constipation, unspecified constipation type  Doing well with use of MiraLAX every other day. Return in about 6 months (around 7/7/2021). Please note portions of this note were completed with a voicerecognition program.  Efforts were made to edit the dictations but occasionally words are mis-transcribed.

## 2021-07-04 DIAGNOSIS — F51.01 PRIMARY INSOMNIA: ICD-10-CM

## 2021-07-06 RX ORDER — AMITRIPTYLINE HYDROCHLORIDE 25 MG/1
TABLET, FILM COATED ORAL
Qty: 90 TABLET | Refills: 1 | Status: SHIPPED | OUTPATIENT
Start: 2021-07-06 | End: 2022-01-11

## 2021-07-07 ENCOUNTER — OFFICE VISIT (OUTPATIENT)
Dept: FAMILY MEDICINE CLINIC | Age: 81
End: 2021-07-07
Payer: MEDICARE

## 2021-07-07 VITALS
SYSTOLIC BLOOD PRESSURE: 110 MMHG | WEIGHT: 160 LBS | DIASTOLIC BLOOD PRESSURE: 70 MMHG | BODY MASS INDEX: 24.33 KG/M2 | TEMPERATURE: 97.2 F

## 2021-07-07 DIAGNOSIS — I10 ESSENTIAL HYPERTENSION: ICD-10-CM

## 2021-07-07 DIAGNOSIS — E78.2 MIXED HYPERLIPIDEMIA: Primary | ICD-10-CM

## 2021-07-07 PROCEDURE — 1036F TOBACCO NON-USER: CPT | Performed by: FAMILY MEDICINE

## 2021-07-07 PROCEDURE — G8420 CALC BMI NORM PARAMETERS: HCPCS | Performed by: FAMILY MEDICINE

## 2021-07-07 PROCEDURE — 99214 OFFICE O/P EST MOD 30 MIN: CPT | Performed by: FAMILY MEDICINE

## 2021-07-07 PROCEDURE — 4040F PNEUMOC VAC/ADMIN/RCVD: CPT | Performed by: FAMILY MEDICINE

## 2021-07-07 PROCEDURE — 1123F ACP DISCUSS/DSCN MKR DOCD: CPT | Performed by: FAMILY MEDICINE

## 2021-07-07 PROCEDURE — G8427 DOCREV CUR MEDS BY ELIG CLIN: HCPCS | Performed by: FAMILY MEDICINE

## 2021-07-07 RX ORDER — LOVASTATIN 20 MG/1
20 TABLET ORAL NIGHTLY
Qty: 90 TABLET | Refills: 3 | Status: SHIPPED | OUTPATIENT
Start: 2021-07-07 | End: 2022-07-26 | Stop reason: SDUPTHER

## 2021-07-07 ASSESSMENT — PATIENT HEALTH QUESTIONNAIRE - PHQ9
SUM OF ALL RESPONSES TO PHQ QUESTIONS 1-9: 0
1. LITTLE INTEREST OR PLEASURE IN DOING THINGS: 0
SUM OF ALL RESPONSES TO PHQ QUESTIONS 1-9: 0
SUM OF ALL RESPONSES TO PHQ9 QUESTIONS 1 & 2: 0
2. FEELING DOWN, DEPRESSED OR HOPELESS: 0
SUM OF ALL RESPONSES TO PHQ QUESTIONS 1-9: 0

## 2021-07-07 ASSESSMENT — ENCOUNTER SYMPTOMS
RHINORRHEA: 1
DIARRHEA: 0
BACK PAIN: 0
SHORTNESS OF BREATH: 1
CONSTIPATION: 1
CHEST TIGHTNESS: 0

## 2021-07-07 NOTE — PROGRESS NOTES
SUBJECTIVE:    Darrell Ritchie is a 68 y.o. male who presents for a follow up visit. Chief Complaint   Patient presents with    Follow-up     Patient is here for a follow up. Discuss lab. No new concerns. Hyperlipidemia  This is a chronic problem. The current episode started more than 1 year ago. Lipid results: Total cholesterol 149, triglycerides 157, HDL 42, LDL 80. Factors aggravating his hyperlipidemia include beta blockers. Associated symptoms include shortness of breath ( with exertion). Pertinent negatives include no chest pain. Current antihyperlipidemic treatment includes statins. The current treatment provides significant improvement of lipids. Compliance problems include adherence to exercise and adherence to diet. Risk factors for coronary artery disease include dyslipidemia, hypertension, male sex and a sedentary lifestyle. Hypertension  This is a chronic problem. The current episode started more than 1 year ago. The problem is controlled. Associated symptoms include shortness of breath ( with exertion). Pertinent negatives include no chest pain or palpitations. Risk factors for coronary artery disease include sedentary lifestyle, male gender and dyslipidemia. Past treatments include beta blockers. The current treatment provides significant improvement. Compliance problems include exercise and diet. Benign Prostatic Hypertrophy  This is a chronic problem. The current episode started more than 1 year ago. The problem has been waxing and waning since onset. Irritative symptoms do not include frequency. Past treatments include tamsulosin. The treatment provided significant relief. He has been using treatment for 2 or more years. Patient's medications, allergies, past medical,surgical, social and family histories were reviewed and updated as appropriate.      Past Medical History:   Diagnosis Date    Acute, but ill-defined, cerebrovascular disease     Cancer ([facility] [address]     skin cancer of scalp - basal    Degeneration of cervical intervertebral disc     Degeneration of lumbar or lumbosacral intervertebral disc     Enlarged prostate     Irritable bowel syndrome     Proteinuria     Pure hypercholesterolemia     Unspecified cerebral artery occlusion with cerebral infarction 2011    bleed      Past Surgical History:   Procedure Laterality Date    AORTIC MITRAL VALVE REPLACEMENT      BACK SURGERY      CARDIAC CATHETERIZATION      attempted stent, but wouldn't hold     Family History   Problem Relation Age of Onset    Diabetes Mother     Heart Disease Mother     Stroke Brother     High Cholesterol Neg Hx     High Blood Pressure Neg Hx      Social History     Tobacco Use    Smoking status: Never Smoker    Smokeless tobacco: Never Used   Substance Use Topics    Alcohol use: No      Allergies   Allergen Reactions    Antihistamine [Diphenhydramine Hcl]      Unable to urinate due to enlarged prostate    Antihistamines, Chlorpheniramine-Type     Aspirin Other (See Comments)     325mg only. Hematuria.  Diphenhydramine      Current Outpatient Medications on File Prior to Visit   Medication Sig Dispense Refill    amitriptyline (ELAVIL) 25 MG tablet TAKE ONE TABLET BY MOUTH ONCE NIGHTLY 90 tablet 1    metoprolol succinate (TOPROL XL) 25 MG extended release tablet TAKE ONE TABLET BY MOUTH DAILY 90 tablet 1    Omega-3 1400 MG CAPS Take by mouth      Netarsudil-Latanoprost (ROCKLATAN) 0.02-0.005 % SOLN Place 1 drop into both eyes daily       tamsulosin (FLOMAX) 0.4 MG capsule Take 0.4 mg by mouth daily      MULTIPLE VITAMIN PO Take 1 tablet by mouth daily       Cholecalciferol (VITAMIN D3) 1.25 MG (00256 UT) TABS Take 1 capsule by mouth daily       polyethylene glycol (GLYCOLAX) packet Take 17 g by mouth every 48 hours as needed       aspirin 81 MG EC tablet Take 81 mg by mouth daily. No current facility-administered medications on file prior to visit.         Review of Systems   Constitutional: Negative for activity change, fatigue and unexpected weight change. HENT: Positive for rhinorrhea. Negative for congestion and postnasal drip. Respiratory: Positive for shortness of breath ( with exertion). Negative for chest tightness. Cardiovascular: Negative for chest pain and palpitations. Gastrointestinal: Positive for constipation ( using miralax). Negative for diarrhea. Genitourinary: Negative for difficulty urinating and frequency. Musculoskeletal: Positive for arthralgias ( shoulders, knees). Negative for back pain. Neurological: Negative for dizziness and light-headedness. Psychiatric/Behavioral: Negative for dysphoric mood and sleep disturbance. The patient is not nervous/anxious. OBJECTIVE:    /70   Temp 97.2 °F (36.2 °C)   Wt 160 lb (72.6 kg)   BMI 24.33 kg/m²    Physical Exam  Constitutional:       Appearance: Normal appearance. He is well-developed. HENT:      Head: Normocephalic and atraumatic. Right Ear: Tympanic membrane and external ear normal.      Left Ear: Tympanic membrane and external ear normal.      Nose: Nose normal.      Mouth/Throat:      Mouth: Mucous membranes are moist.      Pharynx: No posterior oropharyngeal erythema. Eyes:      General:         Right eye: No discharge. Conjunctiva/sclera: Conjunctivae normal.   Neck:      Thyroid: No thyromegaly. Vascular: No JVD. Trachea: No tracheal deviation. Cardiovascular:      Rate and Rhythm: Normal rate and regular rhythm. Heart sounds: Normal heart sounds. Pulmonary:      Effort: Pulmonary effort is normal. No respiratory distress. Breath sounds: Normal breath sounds. No rales. Musculoskeletal:      Cervical back: Normal range of motion and neck supple. Right lower leg: No edema. Left lower leg: No edema. Lymphadenopathy:      Cervical: No cervical adenopathy. Skin:     General: Skin is warm and dry.    Neurological:      Mental Status: He is alert and oriented to person, place, and time. Psychiatric:         Mood and Affect: Mood normal.         Behavior: Behavior normal.         ASSESSMENT/PLAN:    Kerrie Turk was seen today for follow-up. Diagnoses and all orders for this visit:    Mixed hyperlipidemia  Patient's goal LDL should be less than 70. We will need to increase his Mevacor from 10 mg to 20 mg  -     lovastatin (MEVACOR) 20 MG tablet; Take 1 tablet by mouth nightly  -     Comprehensive Metabolic Panel, Fasting; Future  -     Lipid, Fasting; Future  -     TSH with Reflex; Future    Essential hypertension  In good control on current medications    BPH  Symptoms in good control with the use of tamsulosin. He has no nocturia now although he does have some increased frequency through the day. Return in about 4 months (around 11/7/2021). Please note portions of this note were completed with a voicerecognition program.  Efforts were made to edit the dictations but occasionally words are mis-transcribed.

## 2021-10-13 ENCOUNTER — OFFICE VISIT (OUTPATIENT)
Dept: FAMILY MEDICINE CLINIC | Age: 81
End: 2021-10-13
Payer: MEDICARE

## 2021-10-13 DIAGNOSIS — Z23 NEED FOR INFLUENZA VACCINATION: Primary | ICD-10-CM

## 2021-10-13 DIAGNOSIS — L30.9 ECZEMA, UNSPECIFIED TYPE: Primary | ICD-10-CM

## 2021-10-13 PROCEDURE — G0008 ADMIN INFLUENZA VIRUS VAC: HCPCS | Performed by: FAMILY MEDICINE

## 2021-10-13 PROCEDURE — G8428 CUR MEDS NOT DOCUMENT: HCPCS | Performed by: FAMILY MEDICINE

## 2021-10-13 PROCEDURE — G8420 CALC BMI NORM PARAMETERS: HCPCS | Performed by: FAMILY MEDICINE

## 2021-10-13 PROCEDURE — 90694 VACC AIIV4 NO PRSRV 0.5ML IM: CPT | Performed by: FAMILY MEDICINE

## 2021-10-13 NOTE — PROGRESS NOTES
Vaccine Information Sheet, \"Influenza - Inactivated\"  given to Dave Field, or parent/legal guardian of  Dave Field and verbalized understanding. Patient responses:    Have you ever had a reaction to a flu vaccine? No  Do you have any current illness? No  Have you ever had Guillian Wishram Syndrome? No  Do you have a serious allergy to any of the follow: Neomycin, Polymyxin, Thimerosal, eggs or egg products? No    Flu vaccine given per order. Please see immunization tab. Risks and benefits explained. Current VIS given.       Immunizations Administered     Name Date Dose Route    Influenza, Quadv, adjuvanted, 65 yrs +, IM, PF (Fluad) 10/13/2021 0.5 mL Intramuscular    Site: Deltoid- Left    Lot: [id number]    NDC: 71867-841-88

## 2021-11-01 ENCOUNTER — TELEPHONE (OUTPATIENT)
Dept: FAMILY MEDICINE CLINIC | Age: 81
End: 2021-11-01

## 2021-11-01 NOTE — TELEPHONE ENCOUNTER
----- Message from Kumar Griggs sent at 11/1/2021  8:37 AM EDT -----  Subject: Message to Provider    QUESTIONS  Information for Provider? Patient would like to get another copy of his   bloodwork orders. Please reach out to patient.  ---------------------------------------------------------------------------  --------------  CALL BACK INFO  What is the best way for the office to contact you? OK to leave message on   voicemail  Preferred Call Back Phone Number? [phone]  ---------------------------------------------------------------------------  --------------  SCRIPT ANSWERS  Relationship to Patient?  Self

## 2021-11-08 ASSESSMENT — ENCOUNTER SYMPTOMS: BLURRED VISION: 0

## 2021-11-08 NOTE — PROGRESS NOTES
SUBJECTIVE:    Evelia Renae is a 80 y.o. male who presents for a follow up visit. Chief Complaint   Patient presents with    Follow-up     Patient is here for a follow up. Discuss lab. Discuss covid booster. Hypertension  This is a chronic problem. The current episode started more than 1 year ago. The problem is controlled. Pertinent negatives include no blurred vision, chest pain or shortness of breath. Risk factors for coronary artery disease include dyslipidemia, male gender and sedentary lifestyle. Past treatments include beta blockers. The current treatment provides significant improvement. Compliance problems include exercise and diet. Hypertensive end-organ damage includes kidney disease and CAD/MI. Hyperlipidemia  This is a chronic problem. The current episode started more than 1 year ago. Factors aggravating his hyperlipidemia include beta blockers. Pertinent negatives include no chest pain or shortness of breath. Treatments tried:  lovastatin 20 mg daily. The current treatment provides significant improvement of lipids. Compliance problems include adherence to exercise and adherence to diet. Risk factors for coronary artery disease include dyslipidemia, diabetes mellitus, hypertension, male sex and a sedentary lifestyle. Benign Prostatic Hypertrophy  This is a chronic problem. The current episode started more than 1 year ago. The problem has been waxing and waning since onset. Past treatments include tamsulosin. The treatment provided significant relief. He has been using treatment for 2 or more years. Constipation  This is a chronic problem. The current episode started more than 1 year ago. The problem has been waxing and waning since onset. His stool frequency is 2 to 3 times per week. The stool is described as firm and formed. The patient is not on a high fiber diet. He does not exercise regularly. There has not been adequate water intake.  Pertinent negatives include no diarrhea or difficulty urinating. Treatments tried: Miralax qod. The treatment provided mild relief. Patient's medications, allergies, past medical,surgical, social and family histories were reviewed and updated as appropriate. Past Medical History:   Diagnosis Date    Acute, but ill-defined, cerebrovascular disease     Cancer ([address]     skin cancer of scalp - basal    Degeneration of cervical intervertebral disc     Degeneration of lumbar or lumbosacral intervertebral disc     Enlarged prostate     Irritable bowel syndrome     Proteinuria     Pure hypercholesterolemia     Unspecified cerebral artery occlusion with cerebral infarction 2011    bleed      Past Surgical History:   Procedure Laterality Date    AORTIC MITRAL VALVE REPLACEMENT      BACK SURGERY      CARDIAC CATHETERIZATION      attempted stent, but wouldn't hold     Family History   Problem Relation Age of Onset    Diabetes Mother     Heart Disease Mother     Stroke Brother     High Cholesterol Neg Hx     High Blood Pressure Neg Hx      Social History     Tobacco Use    Smoking status: Never Smoker    Smokeless tobacco: Never Used   Substance Use Topics    Alcohol use: No      Allergies   Allergen Reactions    Antihistamine [Diphenhydramine Hcl]      Unable to urinate due to enlarged prostate    Antihistamines, Chlorpheniramine-Type     Aspirin Other (See Comments)     325mg only. Hematuria.      Diphenhydramine      Current Outpatient Medications on File Prior to Visit   Medication Sig Dispense Refill    betamethasone valerate (VALISONE) 0.1 % cream APPLY TOPICALLY TWO TIMES A DAY 45 g 1    lovastatin (MEVACOR) 20 MG tablet Take 1 tablet by mouth nightly 90 tablet 3    amitriptyline (ELAVIL) 25 MG tablet TAKE ONE TABLET BY MOUTH ONCE NIGHTLY 90 tablet 1    metoprolol succinate (TOPROL XL) 25 MG extended release tablet TAKE ONE TABLET BY MOUTH DAILY 90 tablet 1    Netarsudil-Latanoprost (ROCKLATAN) 0.02-0.005 % SOLN Place 1 drop into both eyes daily       tamsulosin (FLOMAX) 0.4 MG capsule Take 0.4 mg by mouth daily      MULTIPLE VITAMIN PO Take 1 tablet by mouth daily       Cholecalciferol (VITAMIN D3) 1.25 MG (29960 UT) TABS Take 1 capsule by mouth daily       polyethylene glycol (GLYCOLAX) packet Take 17 g by mouth every 48 hours as needed       aspirin 81 MG EC tablet Take 81 mg by mouth daily. No current facility-administered medications on file prior to visit. Review of Systems   Constitutional: Negative for activity change, fatigue and unexpected weight change. HENT: Negative for congestion, postnasal drip and rhinorrhea. Eyes: Negative for blurred vision. Respiratory: Negative for chest tightness and shortness of breath. Cardiovascular: Negative for chest pain and leg swelling. Gastrointestinal: Negative for constipation and diarrhea. Genitourinary: Negative for difficulty urinating. OBJECTIVE:    /80   Temp 97.2 °F (36.2 °C)   Wt 154 lb (69.9 kg)   BMI 23.42 kg/m²    Physical Exam  Constitutional:       Appearance: He is well-developed. HENT:      Head: Normocephalic and atraumatic. Right Ear: External ear normal.      Left Ear: External ear normal.      Nose: Nose normal.   Eyes:      General:         Right eye: No discharge. Conjunctiva/sclera: Conjunctivae normal.   Neck:      Thyroid: No thyromegaly. Vascular: No JVD. Trachea: No tracheal deviation. Cardiovascular:      Rate and Rhythm: Normal rate and regular rhythm. Heart sounds: Normal heart sounds. Pulmonary:      Effort: Pulmonary effort is normal. No respiratory distress. Breath sounds: Normal breath sounds. No rales. Musculoskeletal:      Cervical back: Normal range of motion and neck supple. Lymphadenopathy:      Cervical: No cervical adenopathy. Skin:     General: Skin is warm and dry.    Neurological:      Mental Status: He is alert and oriented to person, place, and time.         ASSESSMENT/PLAN:    Danielle Quinonez was seen today for follow-up. Diagnoses and all orders for this visit:    Mixed hyperlipidemia  -     Comprehensive Metabolic Panel, Fasting; Future  -     CBC Auto Differential; Future  -     Lipid, Fasting; Future  -     TSH with Reflex; Future    Benign prostatic hyperplasia without lower urinary tract symptoms  Symptoms in good control    Coronary artery disease involving native heart without angina pectoris, unspecified vessel or lesion type  Followed by cardiology    Essential hypertension  Good control on current medications      Return in about 4 months (around 3/9/2022). Please note portions of this note were completed with a voicerecognition program.  Efforts were made to edit the dictations but occasionally words are mis-transcribed.

## 2021-11-09 ENCOUNTER — OFFICE VISIT (OUTPATIENT)
Dept: FAMILY MEDICINE CLINIC | Age: 81
End: 2021-11-09
Payer: MEDICARE

## 2021-11-09 VITALS
TEMPERATURE: 97.2 F | SYSTOLIC BLOOD PRESSURE: 116 MMHG | WEIGHT: 154 LBS | BODY MASS INDEX: 23.42 KG/M2 | DIASTOLIC BLOOD PRESSURE: 80 MMHG

## 2021-11-09 DIAGNOSIS — I25.10 CORONARY ARTERY DISEASE INVOLVING NATIVE HEART WITHOUT ANGINA PECTORIS, UNSPECIFIED VESSEL OR LESION TYPE: ICD-10-CM

## 2021-11-09 DIAGNOSIS — I10 ESSENTIAL HYPERTENSION: ICD-10-CM

## 2021-11-09 DIAGNOSIS — E78.2 MIXED HYPERLIPIDEMIA: Primary | ICD-10-CM

## 2021-11-09 DIAGNOSIS — N40.0 BENIGN PROSTATIC HYPERPLASIA WITHOUT LOWER URINARY TRACT SYMPTOMS: ICD-10-CM

## 2021-11-09 PROCEDURE — G8484 FLU IMMUNIZE NO ADMIN: HCPCS | Performed by: FAMILY MEDICINE

## 2021-11-09 PROCEDURE — 1036F TOBACCO NON-USER: CPT | Performed by: FAMILY MEDICINE

## 2021-11-09 PROCEDURE — G8427 DOCREV CUR MEDS BY ELIG CLIN: HCPCS | Performed by: FAMILY MEDICINE

## 2021-11-09 PROCEDURE — 4040F PNEUMOC VAC/ADMIN/RCVD: CPT | Performed by: FAMILY MEDICINE

## 2021-11-09 PROCEDURE — 99214 OFFICE O/P EST MOD 30 MIN: CPT | Performed by: FAMILY MEDICINE

## 2021-11-09 PROCEDURE — G8420 CALC BMI NORM PARAMETERS: HCPCS | Performed by: FAMILY MEDICINE

## 2021-11-09 PROCEDURE — 1123F ACP DISCUSS/DSCN MKR DOCD: CPT | Performed by: FAMILY MEDICINE

## 2021-11-09 ASSESSMENT — ENCOUNTER SYMPTOMS
RHINORRHEA: 0
DIARRHEA: 0
CONSTIPATION: 0
SHORTNESS OF BREATH: 0
CHEST TIGHTNESS: 0

## 2022-01-09 DIAGNOSIS — F51.01 PRIMARY INSOMNIA: ICD-10-CM

## 2022-01-11 RX ORDER — AMITRIPTYLINE HYDROCHLORIDE 25 MG/1
TABLET, FILM COATED ORAL
Qty: 90 TABLET | Refills: 1 | Status: SHIPPED | OUTPATIENT
Start: 2022-01-11 | End: 2022-07-22

## 2022-01-11 NOTE — TELEPHONE ENCOUNTER
Medication:   Requested Prescriptions     Pending Prescriptions Disp Refills    amitriptyline (ELAVIL) 25 MG tablet [Pharmacy Med Name: AMITRIPTYLINE HCL 25 MG TAB] 90 tablet 1     Sig: TAKE ONE TABLET BY MOUTH ONCE NIGHTLY     Last Filled:  7/6/21    Last appt: 11/9/2021   Next appt: 3/9/2022    Last Lipid:   Lab Results   Component Value Date    CHOL 149 07/01/2021    TRIG 157 07/01/2021    HDL 42 07/01/2021    HDL 57 06/02/2012    LDLCALC 80 07/01/2021

## 2022-01-18 ENCOUNTER — OFFICE VISIT (OUTPATIENT)
Dept: FAMILY MEDICINE CLINIC | Age: 82
End: 2022-01-18
Payer: MEDICARE

## 2022-01-18 DIAGNOSIS — Z20.822 SUSPECTED COVID-19 VIRUS INFECTION: Primary | ICD-10-CM

## 2022-01-18 PROCEDURE — 1036F TOBACCO NON-USER: CPT | Performed by: NURSE PRACTITIONER

## 2022-01-18 PROCEDURE — G8420 CALC BMI NORM PARAMETERS: HCPCS | Performed by: NURSE PRACTITIONER

## 2022-01-18 PROCEDURE — 4040F PNEUMOC VAC/ADMIN/RCVD: CPT | Performed by: NURSE PRACTITIONER

## 2022-01-18 PROCEDURE — 99212 OFFICE O/P EST SF 10 MIN: CPT | Performed by: NURSE PRACTITIONER

## 2022-01-18 PROCEDURE — 1123F ACP DISCUSS/DSCN MKR DOCD: CPT | Performed by: NURSE PRACTITIONER

## 2022-01-18 PROCEDURE — G8427 DOCREV CUR MEDS BY ELIG CLIN: HCPCS | Performed by: NURSE PRACTITIONER

## 2022-01-18 PROCEDURE — G8484 FLU IMMUNIZE NO ADMIN: HCPCS | Performed by: NURSE PRACTITIONER

## 2022-01-18 NOTE — PROGRESS NOTES
2022  Bree Dove (:  1940)    Allergies: Allergies   Allergen Reactions    Antihistamine [Diphenhydramine Hcl]      Unable to urinate due to enlarged prostate    Antihistamines, Chlorpheniramine-Type     Aspirin Other (See Comments)     325mg only. Hematuria.  Diphenhydramine            FLU/RESPIRATORY/COVID-19 CLINIC EVALUATION    HPI:   Chief Complaint   Patient presents with    Other        SYMPTOMS:  Pt is 80year old male with suspected covid exposure, requesting test.  Pt fully vaccinated. INSTRUCTIONS:  \"[x]\" Indicates a positive item  \"[]\" Indicates a negative item        Symptom duration, days:    Date symptoms started : ____________    [] 1   [] 2   [] 3   [] 4 - 7   [] 8 - 10   [] 11 - 13   [] >14    [] Fevers    [] Symptom (not measured)  [] Measured (Result:  degrees)  [] Chills  [] Cough [] Dry [] Productive   []Loss of Taste  [] Loss of Smell  []Decreased Appetite  [] Coughing up blood  }  [] Chest Congestion  [] Nasal Congestion  [] Runny  Nose  [] Sneezing  [] Feeling short of breath   []Sometimes    [] Frequently    [] All the time     [] Chest pain     [] Headaches  []Tolerable  [] Severe     [] Fatigue  [] Sore throat  [] Muscle aches  [] Nausea  [] Vomiting  []Unable to keep fluids down     [] Diarrhea  [] Mild  []Severe       [x] Vaccinated for COVID 19  [] History of COVID 19 (Date:           )      [] OTHER SYMPTOMS:      Symptom course:   [] Worsening     [] Stable     [] Improving      RISK FACTORS:1INSTRUCTIONS:  \"[x]\" Indicates a positive item. Negative  for risk factors if not checked. [x] Close contact with a lab confirmed COVID-19 patient within 14 days of symptom onset  [] History of travel from affected geographical areas within 14 days of symptom onset        PHYSICAL EXAMINATION:  There were no vitals filed for this visit.        [x] Alert  [x] Oriented to person/place/time    [x] No apparent distress   [] Toxic appearing  [] Face flushed appearing [] Normal Mood  [] Anxious appearing      [] Sclera clear    [] Pinna, TMs,  Canals normal bilaterally  [] TM Red  [] Right [] Left [] Bilateral  [] TM Bulging [] Right [] Left []  Billateral    [] Oropharynx [] Clear [] Red [] Exudate [] Swollen    [] No adenopathy [] Adenopathy __________    [] Lungs clear with good movement and effort  [x] Breathing appears normal     [x] Speaks in complete sentences  [] Appears tachypneic   [] Wheezing           [] Rhonchi   [] Decreased    [] CV RRR  [] No Murmur  [] Murmur  [] Irregular  [] Tachycardic    [] OTHER:  1}      TESTS ORDERED:    [] POCT FLU  [] POCT STREP  [] COVID-19 Test sent  [] Appointment made at testing clinic for patient to get a COVID test.       TEST RESULTS:    POCT FLU test:  [] Positive  [] Negative  POCT STREP test:  [] Positive  [] Negative    ASSESSMENT:  [] Allergic Rhinitis  [] Asthma Exacerbation  [] Bronchitis  [] COPD Exacerbation  [] Gastroenteritis  [] Influenza  [] Sinusitis  [] Strep Throat [] Sore Throat  [] Viral URI   [x] Possible COVID-19   [x] Exposure to COVID -19  [] Positive for COVID  [] Screening for Viral Disease (COVID test no sx)        Jose G Hopkins was seen today for other.     Diagnoses and all orders for this visit:    Suspected COVID-19 virus infection  -     COVID-19              [x] Low risk for complications from COVID 19  [] Moderate risk for complications from COVID 19  [] High risk for complications from COVID 19    PLAN:    [] Discharge home with written instructions for:  [] Flu management  [] Strep throat management  [] Viral respiratory illness management  [] Sinusitis management  [] Bronchitis Management  [x] Possible COVID-19 infection with self-quarantine and management of symptoms  [] Follow-up with primary care physician or emergency department if worsens  [] Note given for work    [] Referred to emergency department for evaluation

## 2022-01-19 LAB — SARS-COV-2: DETECTED

## 2022-03-07 NOTE — PROGRESS NOTES
SUBJECTIVE:    Janett Langley is a 80 y.o. male who presents for a follow up visit. Chief Complaint   Patient presents with    Follow-up     Patient is here for a follow up. No new concerns. Discuss lab. Hypertension  This is a chronic problem. The current episode started more than 1 year ago. The problem is controlled. Pertinent negatives include no chest pain, palpitations or shortness of breath. Risk factors for coronary artery disease include male gender, sedentary lifestyle and dyslipidemia. Past treatments include beta blockers. The current treatment provides significant improvement. Compliance problems include exercise and diet. Benign Prostatic Hypertrophy  This is a chronic problem. The current episode started more than 1 year ago. Irritative symptoms include frequency and nocturia ( x 2). Obstructive symptoms include a weak stream. He is not sexually active. Past treatments include tamsulosin. The treatment provided significant relief. He has been using treatment for 2 or more years. Hyperlipidemia  This is a chronic problem. The current episode started more than 1 year ago. Lipid results: Total cholesterol 139, triglycerides 114, HDL 48, LDL 70. Factors aggravating his hyperlipidemia include beta blockers. Pertinent negatives include no chest pain or shortness of breath. Current antihyperlipidemic treatment includes statins. The current treatment provides significant improvement of lipids. Compliance problems include adherence to exercise and adherence to diet. Risk factors for coronary artery disease include dyslipidemia, hypertension, male sex and a sedentary lifestyle. Patient's medications, allergies, past medical,surgical, social and family histories were reviewed and updated as appropriate.      Past Medical History:   Diagnosis Date    Acute, but ill-defined, cerebrovascular disease     Cancer ([facility] [address]     skin cancer of scalp - basal    Degeneration of cervical intervertebral disc     Degeneration of lumbar or lumbosacral intervertebral disc     Enlarged prostate     Irritable bowel syndrome     Proteinuria     Pure hypercholesterolemia     Unspecified cerebral artery occlusion with cerebral infarction 2011    bleed      Past Surgical History:   Procedure Laterality Date    AORTIC MITRAL VALVE REPLACEMENT      BACK SURGERY      CARDIAC CATHETERIZATION      attempted stent, but wouldn't hold     Family History   Problem Relation Age of Onset    Diabetes Mother     Heart Disease Mother     Stroke Brother     High Cholesterol Neg Hx     High Blood Pressure Neg Hx      Social History     Tobacco Use    Smoking status: Never Smoker    Smokeless tobacco: Never Used   Substance Use Topics    Alcohol use: No      Allergies   Allergen Reactions    Antihistamine [Diphenhydramine Hcl]      Unable to urinate due to enlarged prostate    Antihistamines, Chlorpheniramine-Type     Aspirin Other (See Comments)     325mg only. Hematuria.  Diphenhydramine      Current Outpatient Medications on File Prior to Visit   Medication Sig Dispense Refill    amitriptyline (ELAVIL) 25 MG tablet TAKE ONE TABLET BY MOUTH ONCE NIGHTLY 90 tablet 1    betamethasone valerate (VALISONE) 0.1 % cream APPLY TOPICALLY TWO TIMES A DAY 45 g 1    lovastatin (MEVACOR) 20 MG tablet Take 1 tablet by mouth nightly 90 tablet 3    metoprolol succinate (TOPROL XL) 25 MG extended release tablet TAKE ONE TABLET BY MOUTH DAILY 90 tablet 1    Netarsudil-Latanoprost (ROCKLATAN) 0.02-0.005 % SOLN Place 1 drop into both eyes daily       tamsulosin (FLOMAX) 0.4 MG capsule Take 0.4 mg by mouth daily      MULTIPLE VITAMIN PO Take 1 tablet by mouth daily       Cholecalciferol (VITAMIN D3) 1.25 MG (14042 UT) TABS Take 1 capsule by mouth daily       polyethylene glycol (GLYCOLAX) packet Take 17 g by mouth every 48 hours as needed       aspirin 81 MG EC tablet Take 81 mg by mouth daily.        No Status: He is alert and oriented to person, place, and time. Psychiatric:         Mood and Affect: Mood normal.         Behavior: Behavior normal.         ASSESSMENT/PLAN:    Flora Valle was seen today for follow-up. Diagnoses and all orders for this visit:    Mixed hyperlipidemia  -     Comprehensive Metabolic Panel, Fasting; Future  -     CBC with Auto Differential; Future  -     Lipid, Fasting; Future    Gastroesophageal reflux disease without esophagitis  Symptoms in fairly good control    Coronary artery disease involving native heart without angina pectoris, unspecified vessel or lesion type  Followed by cardiology    Essential hypertension  Good control with current medication    Benign prostatic hyperplasia with urinary frequency  On Flomax but feels he is still having a lot of symptoms. Screening for malignant neoplasm of prostate  -     PSA Screening; Future    Ischemic cardiomyopathy  Followed by cardiology and continue cholesterol medication    Stage 3a chronic kidney disease ([facility] [address]  Creatinine 1.22    Cerebral artery occlusion with cerebral infarction [facility])  Doing well. Continues with aggressive lipid-lowering. Return in about 6 months (around 9/9/2022),  and schedule ear irrigation. Please note portions of this note were completed with a voicerecognition program.  Efforts were made to edit the dictations but occasionally words are mis-transcribed.

## 2022-03-09 ENCOUNTER — OFFICE VISIT (OUTPATIENT)
Dept: FAMILY MEDICINE CLINIC | Age: 82
End: 2022-03-09
Payer: MEDICARE

## 2022-03-09 VITALS
DIASTOLIC BLOOD PRESSURE: 80 MMHG | BODY MASS INDEX: 22.5 KG/M2 | SYSTOLIC BLOOD PRESSURE: 136 MMHG | WEIGHT: 148 LBS | TEMPERATURE: 97.4 F

## 2022-03-09 DIAGNOSIS — N18.31 STAGE 3A CHRONIC KIDNEY DISEASE (HCC): ICD-10-CM

## 2022-03-09 DIAGNOSIS — I10 ESSENTIAL HYPERTENSION: ICD-10-CM

## 2022-03-09 DIAGNOSIS — E78.2 MIXED HYPERLIPIDEMIA: Primary | ICD-10-CM

## 2022-03-09 DIAGNOSIS — K21.9 GASTROESOPHAGEAL REFLUX DISEASE WITHOUT ESOPHAGITIS: ICD-10-CM

## 2022-03-09 DIAGNOSIS — I63.50 CEREBRAL ARTERY OCCLUSION WITH CEREBRAL INFARCTION (HCC): ICD-10-CM

## 2022-03-09 DIAGNOSIS — Z12.5 SCREENING FOR MALIGNANT NEOPLASM OF PROSTATE: ICD-10-CM

## 2022-03-09 DIAGNOSIS — R35.0 BENIGN PROSTATIC HYPERPLASIA WITH URINARY FREQUENCY: ICD-10-CM

## 2022-03-09 DIAGNOSIS — N40.1 BENIGN PROSTATIC HYPERPLASIA WITH URINARY FREQUENCY: ICD-10-CM

## 2022-03-09 DIAGNOSIS — I25.10 CORONARY ARTERY DISEASE INVOLVING NATIVE HEART WITHOUT ANGINA PECTORIS, UNSPECIFIED VESSEL OR LESION TYPE: ICD-10-CM

## 2022-03-09 DIAGNOSIS — I25.5 ISCHEMIC CARDIOMYOPATHY: ICD-10-CM

## 2022-03-09 PROCEDURE — 1036F TOBACCO NON-USER: CPT | Performed by: FAMILY MEDICINE

## 2022-03-09 PROCEDURE — 4040F PNEUMOC VAC/ADMIN/RCVD: CPT | Performed by: FAMILY MEDICINE

## 2022-03-09 PROCEDURE — G8427 DOCREV CUR MEDS BY ELIG CLIN: HCPCS | Performed by: FAMILY MEDICINE

## 2022-03-09 PROCEDURE — G8420 CALC BMI NORM PARAMETERS: HCPCS | Performed by: FAMILY MEDICINE

## 2022-03-09 PROCEDURE — G8484 FLU IMMUNIZE NO ADMIN: HCPCS | Performed by: FAMILY MEDICINE

## 2022-03-09 PROCEDURE — 1123F ACP DISCUSS/DSCN MKR DOCD: CPT | Performed by: FAMILY MEDICINE

## 2022-03-09 PROCEDURE — 99214 OFFICE O/P EST MOD 30 MIN: CPT | Performed by: FAMILY MEDICINE

## 2022-03-09 ASSESSMENT — ENCOUNTER SYMPTOMS
DIARRHEA: 0
CONSTIPATION: 1
RHINORRHEA: 0
CHEST TIGHTNESS: 0
SHORTNESS OF BREATH: 0

## 2022-04-05 ENCOUNTER — OFFICE VISIT (OUTPATIENT)
Dept: FAMILY MEDICINE CLINIC | Age: 82
End: 2022-04-05
Payer: MEDICARE

## 2022-04-05 DIAGNOSIS — H61.23 BILATERAL IMPACTED CERUMEN: Primary | ICD-10-CM

## 2022-04-05 PROCEDURE — G8420 CALC BMI NORM PARAMETERS: HCPCS | Performed by: FAMILY MEDICINE

## 2022-04-05 PROCEDURE — G8428 CUR MEDS NOT DOCUMENT: HCPCS | Performed by: FAMILY MEDICINE

## 2022-04-05 PROCEDURE — 99211 OFF/OP EST MAY X REQ PHY/QHP: CPT | Performed by: FAMILY MEDICINE

## 2022-04-05 NOTE — PROGRESS NOTES
Encounters:   07/02/18 86/60   05/22/18 116/68   07/28/17 108/70     Wt Readings from Last 3 Encounters:   07/02/18 168 lb (76.2 kg)   05/22/18 168 lb 12.8 oz (76.6 kg)   07/28/17 163 lb 6 oz (74.1 kg)       Physical Exam   Constitutional: He appears well-developed and well-nourished. Cardiovascular: Normal rate and regular rhythm. Occasional extrasystoles are present. Murmur heard. Systolic murmur is present with a grade of 2/6   2+pedal pulses; no LE edema   Pulmonary/Chest: Effort normal and breath sounds normal. No respiratory distress. He has no wheezes. He has no rales. Abdominal: Soft. Normal appearance and bowel sounds are normal. There is no hepatosplenomegaly. There is no tenderness. There is no rebound. Assessment/Plan:    1. Essential hypertension  Blood pressure has been running low; even in the 90's/60's at home. I have advised him to decrease the coreg to 1/2 tab BID until cardiology appt later this month. I have also advised him to ask cardiology about exercise at this point. He is anxious about starting exercise routine, but I do feel he would benefit from this. He would be more comfortable doing this supervised (like at [facility]); so will see how bp respond to decrease in med and have him ask cardiology about this. - carvedilol (COREG) 25 MG tablet; Take 0.5 tablets by mouth 2 times daily  Dispense: 180 tablet; Refill: 2  - CBC Auto Differential; Future  - Comprehensive Metabolic Panel; Future    2. PVC (premature ventricular contraction)  Present on exam; has f/u with cardio later this month. - carvedilol (COREG) 25 MG tablet; Take 0.5 tablets by mouth 2 times daily  Dispense: 180 tablet; Refill: 2    3. Pure hypercholesterolemia  Has been getting bloodwork checked through specialists; will order to complete in fall.  - Lipid Panel; Future  - TSH without Reflex; Future    4. Abdominal bloating  Due this month for screening colonoscopy; would like GI to eval for bloating.  Might be Intermediate / Complex Repair - Final Wound Length In Cm: 3.3

## 2022-06-07 ENCOUNTER — TELEPHONE (OUTPATIENT)
Dept: FAMILY MEDICINE CLINIC | Age: 82
End: 2022-06-07

## 2022-06-07 NOTE — TELEPHONE ENCOUNTER
I did not see in Dr. Ozzie Adams notes that he has orthopedic issues.   This may need to wait for Dr. Bony Barnes to decide

## 2022-06-07 NOTE — TELEPHONE ENCOUNTER
Patient calling to see if he is able to get a handicap placard. States it is getting harder for him to walk to and from the store from the non-handicap parking spots. Call back number [phone].     Provider out of the office

## 2022-07-22 DIAGNOSIS — F51.01 PRIMARY INSOMNIA: ICD-10-CM

## 2022-07-22 RX ORDER — AMITRIPTYLINE HYDROCHLORIDE 25 MG/1
TABLET, FILM COATED ORAL
Qty: 90 TABLET | Refills: 1 | Status: SHIPPED | OUTPATIENT
Start: 2022-07-22

## 2022-07-26 DIAGNOSIS — E78.2 MIXED HYPERLIPIDEMIA: ICD-10-CM

## 2022-07-26 RX ORDER — LOVASTATIN 20 MG/1
20 TABLET ORAL NIGHTLY
Qty: 90 TABLET | Refills: 3 | Status: SHIPPED | OUTPATIENT
Start: 2022-07-26 | End: 2022-10-24

## 2022-09-02 LAB
A/G RATIO: 1.7 (ref 1.2–2.2)
ALBUMIN SERPL-MCNC: 3.8 G/DL (ref 3.7–4.7)
ALP BLD-CCNC: 89 IU/L (ref 44–121)
ALT SERPL-CCNC: 8 IU/L (ref 0–44)
AMBIGUOUS ABBREVIATION: NORMAL
AST SERPL-CCNC: 13 IU/L (ref 0–40)
BASOPHILS ABSOLUTE: 0.1 X10E3/UL (ref 0–0.2)
BASOPHILS RELATIVE PERCENT: 1 %
BILIRUB SERPL-MCNC: 0.4 MG/DL (ref 0–1.2)
BUN / CREAT RATIO: 19 (ref 10–24)
BUN BLDV-MCNC: 27 MG/DL (ref 8–27)
CALCIUM SERPL-MCNC: 8.7 MG/DL (ref 8.6–10.2)
CHLORIDE BLD-SCNC: 104 MMOL/L (ref 96–106)
CHOLESTEROL, TOTAL: 153 MG/DL (ref 100–199)
CO2: 23 MMOL/L (ref 20–29)
COMMENT: NORMAL
CREAT SERPL-MCNC: 1.44 MG/DL (ref 0.76–1.27)
EGFR (CKD-EPI): 49 ML/MIN/1.73
EOSINOPHILS ABSOLUTE: 0.3 X10E3/UL (ref 0–0.4)
EOSINOPHILS RELATIVE PERCENT: 4 %
ERYTHROCYTES, NUCLEATED/100 LEU: ABNORMAL
GLOBULIN: 2.3 G/DL (ref 1.5–4.5)
GLUCOSE BLD-MCNC: 94 MG/DL (ref 65–99)
HCT VFR BLD CALC: 43.5 % (ref 37.5–51)
HDLC SERPL-MCNC: 50 MG/DL
HEMOGLOBIN: 14.3 G/DL (ref 13–17.7)
IMMATURE CELLS ABSOLUTE COUNT: ABNORMAL
IMMATURE GRANS (ABS): 0 X10E3/UL (ref 0–0.1)
IMMATURE GRANULOCYTES: 0 %
LDL CHOLESTEROL CALCULATED: 84 MG/DL (ref 0–99)
LYMPHOCYTES ABSOLUTE: 2 X10E3/UL (ref 0.7–3.1)
LYMPHOCYTES RELATIVE PERCENT: 25 %
MCH RBC QN AUTO: 30.6 PG (ref 26.6–33)
MCHC RBC AUTO-ENTMCNC: 32.9 G/DL (ref 31.5–35.7)
MCV RBC AUTO: 93 FL (ref 79–97)
MONOCYTES ABSOLUTE: 0.8 X10E3/UL (ref 0.1–0.9)
MONOCYTES RELATIVE PERCENT: 10 %
MORPHOLOGY: ABNORMAL
NEUTROPHILS ABSOLUTE: 4.7 X10E3/UL (ref 1.4–7)
PDW BLD-RTO: 13.2 % (ref 11.6–15.4)
PLATELET # BLD: 143 X10E3/UL (ref 150–450)
POTASSIUM SERPL-SCNC: 4.5 MMOL/L (ref 3.5–5.2)
PROSTATE SPECIFIC ANTIGEN: 4.8 NG/ML (ref 0–4)
RBC # BLD: 4.68 X10E6/UL (ref 4.14–5.8)
SEGMENTED NEUTROPHILS RELATIVE PERCENT: 60 %
SODIUM BLD-SCNC: 141 MMOL/L (ref 134–144)
TOTAL PROTEIN: 6.1 G/DL (ref 6–8.5)
TRIGL SERPL-MCNC: 103 MG/DL (ref 0–149)
VLDLC SERPL CALC-MCNC: 19 MG/DL (ref 5–40)
WBC # BLD: 7.8 X10E3/UL (ref 3.4–10.8)

## 2022-09-25 NOTE — PROGRESS NOTES
SUBJECTIVE:    Lorie Julian is a [de-identified] y.o. male who presents for a follow up visit. Chief Complaint   Patient presents with    Follow-up     Patient is here for a follow up. Having trouble sleeping. Discuss lab, elevated PSA. Hyperlipidemia  This is a chronic problem. The current episode started more than 1 year ago. Lipid results: Total cholesterol 153, HDL 50,  triglycerides 103, LDL 84. Factors aggravating his hyperlipidemia include beta blockers. Associated symptoms include shortness of breath (only with strenuous activity). Pertinent negatives include no chest pain. Current antihyperlipidemic treatment includes statins. The current treatment provides significant improvement of lipids. Compliance problems include adherence to exercise and adherence to diet. Risk factors for coronary artery disease include dyslipidemia, hypertension, a sedentary lifestyle and male sex. Hypertension  This is a chronic problem. The current episode started more than 1 year ago. The problem is controlled. Associated symptoms include shortness of breath (only with strenuous activity). Pertinent negatives include no chest pain. Past treatments include alpha 1 blockers and beta blockers. The current treatment provides significant improvement. Compliance problems include medication side effects and diet. Coronary Artery Disease  Presents for follow-up visit. Symptoms include shortness of breath (only with strenuous activity). Pertinent negatives include no chest pain, chest pressure, chest tightness, dizziness or leg swelling. Risk factors include hyperlipidemia. The symptoms have been stable. Compliance with diet is good. Compliance with exercise is poor. Compliance with medications is good. Benign Prostatic Hypertrophy  This is a chronic problem. The current episode started more than 1 year ago. The problem has been waxing and waning since onset. Past treatments include tamsulosin.  The treatment provided significant relief. He has been using treatment for 2 or more years. Patient's medications, allergies, past medical,surgical, social and family histories were reviewed and updated as appropriate. Past Medical History:   Diagnosis Date    Acute, but ill-defined, cerebrovascular disease     Cancer ([address]     skin cancer of scalp - basal    Degeneration of cervical intervertebral disc     Degeneration of lumbar or lumbosacral intervertebral disc     Enlarged prostate     Irritable bowel syndrome     Proteinuria     Pure hypercholesterolemia     Unspecified cerebral artery occlusion with cerebral infarction 2011    bleed      Past Surgical History:   Procedure Laterality Date    AORTIC MITRAL VALVE REPLACEMENT      BACK SURGERY      CARDIAC CATHETERIZATION      attempted stent, but wouldn't hold     Family History   Problem Relation Age of Onset    Diabetes Mother     Heart Disease Mother     Stroke Brother     High Cholesterol Neg Hx     High Blood Pressure Neg Hx      Social History     Tobacco Use    Smoking status: Never    Smokeless tobacco: Never   Substance Use Topics    Alcohol use: No      Allergies   Allergen Reactions    Antihistamine [Diphenhydramine Hcl]      Unable to urinate due to enlarged prostate    Antihistamines, Chlorpheniramine-Type     Aspirin Other (See Comments)     325mg only. Hematuria.      Diphenhydramine      Current Outpatient Medications on File Prior to Visit   Medication Sig Dispense Refill    apixaban (ELIQUIS) 5 MG TABS tablet Take by mouth 2 times daily      lovastatin (MEVACOR) 20 MG tablet Take 1 tablet by mouth nightly 90 tablet 3    amitriptyline (ELAVIL) 25 MG tablet TAKE ONE TABLET BY MOUTH ONCE NIGHTLY 90 tablet 1    betamethasone valerate (VALISONE) 0.1 % cream APPLY TOPICALLY TWO TIMES A DAY 45 g 1    metoprolol succinate (TOPROL XL) 25 MG extended release tablet TAKE ONE TABLET BY MOUTH DAILY 90 tablet 1    Netarsudil-Latanoprost (ROCKLATAN) 0.02-0.005 % [facility] 1 drop into both eyes daily       tamsulosin (FLOMAX) 0.4 MG capsule Take 0.4 mg by mouth daily      MULTIPLE VITAMIN PO Take 1 tablet by mouth daily       Cholecalciferol (VITAMIN D3) 1.25 MG (89984 UT) TABS Take 1 capsule by mouth daily       polyethylene glycol (GLYCOLAX) packet Take 17 g by mouth every 48 hours as needed       aspirin 81 MG EC tablet Take 81 mg by mouth daily. No current facility-administered medications on file prior to visit. Review of Systems   Constitutional:  Negative for fatigue. HENT:  Positive for postnasal drip and rhinorrhea. Negative for congestion. Respiratory:  Positive for shortness of breath (only with strenuous activity). Negative for chest tightness. Cardiovascular:  Negative for chest pain and leg swelling. Gastrointestinal:  Positive for constipation (controlled with Miralax and stool softner). Negative for diarrhea. Genitourinary:  Positive for difficulty urinating (slow secondary to BPH). Musculoskeletal:  Negative for back pain. Neurological:  Negative for dizziness and light-headedness. Psychiatric/Behavioral:  Positive for sleep disturbance (MNA, DFA). Negative for dysphoric mood. The patient is not nervous/anxious. OBJECTIVE:    /80   Pulse 74   Temp 97.2 °F (36.2 °C)   Ht 5' 8\" (1.727 m)   Wt 158 lb (71.7 kg)   SpO2 98%   BMI 24.02 kg/m²    Physical Exam  Constitutional:       General: He is not in acute distress. Appearance: Normal appearance. He is well-developed. HENT:      Head: Normocephalic and atraumatic. Right Ear: Tympanic membrane and external ear normal.      Left Ear: Tympanic membrane and external ear normal.      Nose: Nose normal.      Mouth/Throat:      Mouth: Mucous membranes are moist.      Pharynx: No posterior oropharyngeal erythema. Eyes:      General:         Right eye: No discharge. Conjunctiva/sclera: Conjunctivae normal.   Neck:      Thyroid: No thyromegaly. Vascular: No JVD. Trachea: No tracheal deviation. Cardiovascular:      Rate and Rhythm: Normal rate and regular rhythm. Heart sounds: Normal heart sounds. Pulmonary:      Effort: Pulmonary effort is normal. No respiratory distress. Breath sounds: Normal breath sounds. No rales. Musculoskeletal:      Cervical back: Normal range of motion and neck supple. Right lower leg: No edema. Left lower leg: No edema. Lymphadenopathy:      Cervical: No cervical adenopathy. Skin:     General: Skin is warm and dry. Neurological:      Mental Status: He is alert and oriented to person, place, and time. Psychiatric:         Mood and Affect: Mood normal.         Behavior: Behavior normal.       ASSESSMENT/PLAN:    Nica Ng was seen today for follow-up. Diagnoses and all orders for this visit:    Mixed hyperlipidemia  -     Comprehensive Metabolic Panel, Fasting; Future  -     CBC with Auto Differential; Future  -     Lipid, Fasting; Future  -     TSH with Reflex; Future    Degeneration of lumbar or lumbosacral intervertebral disc  Patient has a history of chronic back pain    Benign prostatic hyperplasia with urinary frequency  Symptoms under fair control with the use of Flomax    Gastroesophageal reflux disease without esophagitis  Patient states he no longer uses a PPI    Coronary artery disease involving native heart without angina pectoris, unspecified vessel or lesion type  Things are stable. He continues to see cardiology. Ischemic cardiomyopathy  Followed by cardiology    Stage 3a chronic kidney disease ([facility] [address]  Most recent creatinine 1.44 given an estimated GFR of 49    Screening for malignant neoplasm of prostate  -     PSA Screening; Future    Elevated PSA  -     PSA Screening; Future    Thrombocytopenia, unspecified  Followed by hematology    Return in about 6 months (around 3/27/2023).     Please note portions of this note were completed with a voicerecognition program.  Efforts were made to edit the dictations but occasionally words are mis-transcribed.

## 2022-09-26 ASSESSMENT — ENCOUNTER SYMPTOMS: CHEST TIGHTNESS: 0

## 2022-09-27 ENCOUNTER — OFFICE VISIT (OUTPATIENT)
Dept: FAMILY MEDICINE CLINIC | Age: 82
End: 2022-09-27
Payer: MEDICARE

## 2022-09-27 VITALS
OXYGEN SATURATION: 98 % | HEIGHT: 68 IN | HEART RATE: 74 BPM | BODY MASS INDEX: 23.95 KG/M2 | DIASTOLIC BLOOD PRESSURE: 80 MMHG | TEMPERATURE: 97.2 F | WEIGHT: 158 LBS | SYSTOLIC BLOOD PRESSURE: 124 MMHG

## 2022-09-27 DIAGNOSIS — Z12.5 SCREENING FOR MALIGNANT NEOPLASM OF PROSTATE: ICD-10-CM

## 2022-09-27 DIAGNOSIS — M51.37 DEGENERATION OF LUMBAR OR LUMBOSACRAL INTERVERTEBRAL DISC: ICD-10-CM

## 2022-09-27 DIAGNOSIS — D69.6 THROMBOCYTOPENIA, UNSPECIFIED (HCC): ICD-10-CM

## 2022-09-27 DIAGNOSIS — R35.0 BENIGN PROSTATIC HYPERPLASIA WITH URINARY FREQUENCY: ICD-10-CM

## 2022-09-27 DIAGNOSIS — I25.5 ISCHEMIC CARDIOMYOPATHY: ICD-10-CM

## 2022-09-27 DIAGNOSIS — R97.20 ELEVATED PSA: ICD-10-CM

## 2022-09-27 DIAGNOSIS — E78.2 MIXED HYPERLIPIDEMIA: Primary | ICD-10-CM

## 2022-09-27 DIAGNOSIS — N40.1 BENIGN PROSTATIC HYPERPLASIA WITH URINARY FREQUENCY: ICD-10-CM

## 2022-09-27 DIAGNOSIS — K21.9 GASTROESOPHAGEAL REFLUX DISEASE WITHOUT ESOPHAGITIS: ICD-10-CM

## 2022-09-27 DIAGNOSIS — I25.10 CORONARY ARTERY DISEASE INVOLVING NATIVE HEART WITHOUT ANGINA PECTORIS, UNSPECIFIED VESSEL OR LESION TYPE: ICD-10-CM

## 2022-09-27 DIAGNOSIS — N18.31 STAGE 3A CHRONIC KIDNEY DISEASE (HCC): ICD-10-CM

## 2022-09-27 PROCEDURE — G8420 CALC BMI NORM PARAMETERS: HCPCS | Performed by: FAMILY MEDICINE

## 2022-09-27 PROCEDURE — 1123F ACP DISCUSS/DSCN MKR DOCD: CPT | Performed by: FAMILY MEDICINE

## 2022-09-27 PROCEDURE — G8427 DOCREV CUR MEDS BY ELIG CLIN: HCPCS | Performed by: FAMILY MEDICINE

## 2022-09-27 PROCEDURE — 1036F TOBACCO NON-USER: CPT | Performed by: FAMILY MEDICINE

## 2022-09-27 PROCEDURE — 99214 OFFICE O/P EST MOD 30 MIN: CPT | Performed by: FAMILY MEDICINE

## 2022-09-27 ASSESSMENT — ENCOUNTER SYMPTOMS
RHINORRHEA: 1
BACK PAIN: 0
DIARRHEA: 0
CONSTIPATION: 1
SHORTNESS OF BREATH: 1

## 2022-09-27 ASSESSMENT — PATIENT HEALTH QUESTIONNAIRE - PHQ9
SUM OF ALL RESPONSES TO PHQ QUESTIONS 1-9: 2
2. FEELING DOWN, DEPRESSED OR HOPELESS: 1
SUM OF ALL RESPONSES TO PHQ9 QUESTIONS 1 & 2: 2
SUM OF ALL RESPONSES TO PHQ QUESTIONS 1-9: 2
1. LITTLE INTEREST OR PLEASURE IN DOING THINGS: 1

## 2022-11-10 DIAGNOSIS — F51.01 PRIMARY INSOMNIA: ICD-10-CM

## 2022-11-10 RX ORDER — AMITRIPTYLINE HYDROCHLORIDE 50 MG/1
50 TABLET, FILM COATED ORAL NIGHTLY
Qty: 90 TABLET | Refills: 3 | Status: SHIPPED | OUTPATIENT
Start: 2022-11-10

## 2022-11-10 NOTE — TELEPHONE ENCOUNTER
Patient calling in stating that [address] doubled his dose last time to see if it would help patient. Patient stated that the medication did help and is requesting a medication refill.      amitriptyline (ELAVIL) 25 MG tablet [[id number]]     Order Details  Dose, Route, Frequency: As Directed   Dispense Quantity: 90 tablet Refills: 1          Sig: TAKE ONE TABLET BY MOUTH ONCE NIGHTLY     [facility] [id number] [place], [place] - [address] [phone] Tatianna Davalos [phone]   [address] [place] [address]   Phone:  [phone]  Fax:  [phone]    Thank you

## 2023-03-22 LAB
A/G RATIO: 1.7 (ref 1.2–2.2)
ALBUMIN SERPL-MCNC: 4 G/DL (ref 3.7–4.7)
ALP BLD-CCNC: 95 IU/L (ref 44–121)
ALT SERPL-CCNC: 8 IU/L (ref 0–44)
AMBIGUOUS ABBREVIATION: NORMAL
AST SERPL-CCNC: 16 IU/L (ref 0–40)
BASOPHILS ABSOLUTE: 0.1 X10E3/UL (ref 0–0.2)
BASOPHILS RELATIVE PERCENT: 1 %
BILIRUB SERPL-MCNC: 0.5 MG/DL (ref 0–1.2)
BUN / CREAT RATIO: 17 (ref 10–24)
BUN BLDV-MCNC: 24 MG/DL (ref 8–27)
CALCIUM SERPL-MCNC: 9 MG/DL (ref 8.6–10.2)
CHLORIDE BLD-SCNC: 105 MMOL/L (ref 96–106)
CHOLESTEROL, TOTAL: 175 MG/DL (ref 100–199)
CO2: 24 MMOL/L (ref 20–29)
COMMENT: ABNORMAL
CREAT SERPL-MCNC: 1.38 MG/DL (ref 0.76–1.27)
EOSINOPHILS ABSOLUTE: 0.3 X10E3/UL (ref 0–0.4)
EOSINOPHILS RELATIVE PERCENT: 4 %
ERYTHROCYTES, NUCLEATED/100 LEU: NORMAL
ESTIMATED GLOMERULAR FILTRATION RATE CREATININE EQUATION: 52 ML/MIN/1.73
GLOBULIN: 2.4 G/DL (ref 1.5–4.5)
GLUCOSE BLD-MCNC: 93 MG/DL (ref 70–99)
HCT VFR BLD CALC: 45.5 % (ref 37.5–51)
HDLC SERPL-MCNC: 50 MG/DL
HEMOGLOBIN: 15 G/DL (ref 13–17.7)
IMMATURE CELLS ABSOLUTE COUNT: NORMAL
IMMATURE GRANS (ABS): 0 X10E3/UL (ref 0–0.1)
IMMATURE GRANULOCYTES: 0 %
LDL CHOLESTEROL CALCULATED: 103 MG/DL (ref 0–99)
LYMPHOCYTES ABSOLUTE: 1.9 X10E3/UL (ref 0.7–3.1)
LYMPHOCYTES RELATIVE PERCENT: 25 %
MCH RBC QN AUTO: 31.1 PG (ref 26.6–33)
MCHC RBC AUTO-ENTMCNC: 33 G/DL (ref 31.5–35.7)
MCV RBC AUTO: 94 FL (ref 79–97)
MONOCYTES ABSOLUTE: 0.7 X10E3/UL (ref 0.1–0.9)
MONOCYTES RELATIVE PERCENT: 10 %
MORPHOLOGY: NORMAL
NEUTROPHILS ABSOLUTE: 4.6 X10E3/UL (ref 1.4–7)
PDW BLD-RTO: 12.8 % (ref 11.6–15.4)
PLATELET # BLD: 170 X10E3/UL (ref 150–450)
POTASSIUM SERPL-SCNC: 4.1 MMOL/L (ref 3.5–5.2)
PROSTATE SPECIFIC ANTIGEN: 4.8 NG/ML (ref 0–4)
RBC # BLD: 4.82 X10E6/UL (ref 4.14–5.8)
SEGMENTED NEUTROPHILS RELATIVE PERCENT: 60 %
SODIUM BLD-SCNC: 142 MMOL/L (ref 134–144)
TOTAL PROTEIN: 6.4 G/DL (ref 6–8.5)
TRIGL SERPL-MCNC: 122 MG/DL (ref 0–149)
TSH SERPL DL<=0.05 MIU/L-ACNC: 3.95 UIU/ML (ref 0.45–4.5)
VLDLC SERPL CALC-MCNC: 22 MG/DL (ref 5–40)
WBC # BLD: 7.6 X10E3/UL (ref 3.4–10.8)

## 2023-03-26 PROBLEM — D69.6 THROMBOCYTOPENIA, UNSPECIFIED (HCC): Status: RESOLVED | Noted: 2022-09-27 | Resolved: 2023-03-26

## 2023-03-26 NOTE — PROGRESS NOTES
cervical intervertebral disc     Degeneration of lumbar or lumbosacral intervertebral disc     Enlarged prostate     Irritable bowel syndrome     Proteinuria     Pure hypercholesterolemia     Unspecified cerebral artery occlusion with cerebral infarction 2011    bleed      Past Surgical History:   Procedure Laterality Date    AORTIC MITRAL VALVE REPLACEMENT      BACK SURGERY      CARDIAC CATHETERIZATION      attempted stent, but wouldn't hold     Family History   Problem Relation Age of Onset    Diabetes Mother     Heart Disease Mother     Stroke Brother     High Cholesterol Neg Hx     High Blood Pressure Neg Hx      Social History     Tobacco Use    Smoking status: Never    Smokeless tobacco: Never   Substance Use Topics    Alcohol use: No      Allergies   Allergen Reactions    Antihistamine [Diphenhydramine Hcl]      Unable to urinate due to enlarged prostate    Antihistamines, Chlorpheniramine-Type     Aspirin Other (See Comments)     325mg only. Hematuria.      Diphenhydramine      Current Outpatient Medications on File Prior to Visit   Medication Sig Dispense Refill    amitriptyline (ELAVIL) 50 MG tablet Take 1 tablet by mouth nightly 90 tablet 3    apixaban (ELIQUIS) 5 MG TABS tablet Take by mouth 2 times daily      lovastatin (MEVACOR) 20 MG tablet Take 1 tablet by mouth nightly 90 tablet 3    betamethasone valerate (VALISONE) 0.1 % cream APPLY TOPICALLY TWO TIMES A DAY 45 g 1    metoprolol succinate (TOPROL XL) 25 MG extended release tablet TAKE ONE TABLET BY MOUTH DAILY 90 tablet 1    Netarsudil-Latanoprost (ROCKLATAN) 0.02-0.005 % SOLN Place 1 drop into both eyes daily       tamsulosin (FLOMAX) 0.4 MG capsule Take 0.4 mg by mouth daily      MULTIPLE VITAMIN PO Take 1 tablet by mouth daily       Cholecalciferol (VITAMIN D3) 1.25 MG (06100 UT) TABS Take 1 capsule by mouth daily       polyethylene glycol (GLYCOLAX) packet Take 17 g by mouth every 48 hours as needed       aspirin 81 MG EC tablet Take 81 mg by

## 2023-03-28 ENCOUNTER — OFFICE VISIT (OUTPATIENT)
Dept: FAMILY MEDICINE CLINIC | Age: 83
End: 2023-03-28
Payer: MEDICARE

## 2023-03-28 VITALS
RESPIRATION RATE: 12 BRPM | TEMPERATURE: 97 F | SYSTOLIC BLOOD PRESSURE: 104 MMHG | BODY MASS INDEX: 25.61 KG/M2 | DIASTOLIC BLOOD PRESSURE: 64 MMHG | OXYGEN SATURATION: 99 % | WEIGHT: 168.4 LBS | HEART RATE: 80 BPM

## 2023-03-28 DIAGNOSIS — I42.9 CARDIOMYOPATHY, UNSPECIFIED TYPE (HCC): ICD-10-CM

## 2023-03-28 DIAGNOSIS — I63.50 CEREBRAL ARTERY OCCLUSION WITH CEREBRAL INFARCTION (HCC): Primary | ICD-10-CM

## 2023-03-28 DIAGNOSIS — C80.1 CANCER (HCC): ICD-10-CM

## 2023-03-28 DIAGNOSIS — N18.31 STAGE 3A CHRONIC KIDNEY DISEASE (HCC): ICD-10-CM

## 2023-03-28 DIAGNOSIS — I10 ESSENTIAL HYPERTENSION: ICD-10-CM

## 2023-03-28 DIAGNOSIS — N40.1 BENIGN PROSTATIC HYPERPLASIA WITH URINARY FREQUENCY: ICD-10-CM

## 2023-03-28 DIAGNOSIS — R35.0 BENIGN PROSTATIC HYPERPLASIA WITH URINARY FREQUENCY: ICD-10-CM

## 2023-03-28 DIAGNOSIS — E78.2 MIXED HYPERLIPIDEMIA: ICD-10-CM

## 2023-03-28 PROCEDURE — G8427 DOCREV CUR MEDS BY ELIG CLIN: HCPCS | Performed by: FAMILY MEDICINE

## 2023-03-28 PROCEDURE — 99214 OFFICE O/P EST MOD 30 MIN: CPT | Performed by: FAMILY MEDICINE

## 2023-03-28 PROCEDURE — G8484 FLU IMMUNIZE NO ADMIN: HCPCS | Performed by: FAMILY MEDICINE

## 2023-03-28 PROCEDURE — G8417 CALC BMI ABV UP PARAM F/U: HCPCS | Performed by: FAMILY MEDICINE

## 2023-03-28 PROCEDURE — 1036F TOBACCO NON-USER: CPT | Performed by: FAMILY MEDICINE

## 2023-03-28 PROCEDURE — 1123F ACP DISCUSS/DSCN MKR DOCD: CPT | Performed by: FAMILY MEDICINE

## 2023-03-28 PROCEDURE — 3078F DIAST BP <80 MM HG: CPT | Performed by: FAMILY MEDICINE

## 2023-03-28 PROCEDURE — 3074F SYST BP LT 130 MM HG: CPT | Performed by: FAMILY MEDICINE

## 2023-03-28 SDOH — ECONOMIC STABILITY: INCOME INSECURITY: HOW HARD IS IT FOR YOU TO PAY FOR THE VERY BASICS LIKE FOOD, HOUSING, MEDICAL CARE, AND HEATING?: NOT HARD AT ALL

## 2023-03-28 SDOH — ECONOMIC STABILITY: HOUSING INSECURITY
IN THE LAST 12 MONTHS, WAS THERE A TIME WHEN YOU DID NOT HAVE A STEADY PLACE TO SLEEP OR SLEPT IN A SHELTER (INCLUDING NOW)?: NO

## 2023-03-28 SDOH — ECONOMIC STABILITY: FOOD INSECURITY: WITHIN THE PAST 12 MONTHS, YOU WORRIED THAT YOUR FOOD WOULD RUN OUT BEFORE YOU GOT MONEY TO BUY MORE.: NEVER TRUE

## 2023-03-28 SDOH — ECONOMIC STABILITY: FOOD INSECURITY: WITHIN THE PAST 12 MONTHS, THE FOOD YOU BOUGHT JUST DIDN'T LAST AND YOU DIDN'T HAVE MONEY TO GET MORE.: NEVER TRUE

## 2023-03-28 ASSESSMENT — PATIENT HEALTH QUESTIONNAIRE - PHQ9
SUM OF ALL RESPONSES TO PHQ9 QUESTIONS 1 & 2: 0
SUM OF ALL RESPONSES TO PHQ QUESTIONS 1-9: 0
2. FEELING DOWN, DEPRESSED OR HOPELESS: 0
SUM OF ALL RESPONSES TO PHQ QUESTIONS 1-9: 0
1. LITTLE INTEREST OR PLEASURE IN DOING THINGS: 0

## 2023-03-28 ASSESSMENT — ENCOUNTER SYMPTOMS
RHINORRHEA: 0
SHORTNESS OF BREATH: 1
DIARRHEA: 0
CHEST TIGHTNESS: 0
BACK PAIN: 1
CONSTIPATION: 1
ABDOMINAL PAIN: 0

## 2023-04-17 ENCOUNTER — OFFICE VISIT (OUTPATIENT)
Dept: FAMILY MEDICINE CLINIC | Age: 83
End: 2023-04-17
Payer: MEDICARE

## 2023-04-17 VITALS — TEMPERATURE: 97 F | OXYGEN SATURATION: 99 % | HEART RATE: 58 BPM

## 2023-04-17 DIAGNOSIS — H66.90 ACUTE OTITIS MEDIA, UNSPECIFIED OTITIS MEDIA TYPE: Primary | ICD-10-CM

## 2023-04-17 PROCEDURE — 99213 OFFICE O/P EST LOW 20 MIN: CPT | Performed by: FAMILY MEDICINE

## 2023-04-17 PROCEDURE — 1123F ACP DISCUSS/DSCN MKR DOCD: CPT | Performed by: FAMILY MEDICINE

## 2023-04-17 PROCEDURE — 1036F TOBACCO NON-USER: CPT | Performed by: FAMILY MEDICINE

## 2023-04-17 PROCEDURE — G8428 CUR MEDS NOT DOCUMENT: HCPCS | Performed by: FAMILY MEDICINE

## 2023-04-17 PROCEDURE — G8417 CALC BMI ABV UP PARAM F/U: HCPCS | Performed by: FAMILY MEDICINE

## 2023-04-17 RX ORDER — AMOXICILLIN 500 MG/1
1000 CAPSULE ORAL 2 TIMES DAILY
Qty: 28 CAPSULE | Refills: 0 | Status: SHIPPED | OUTPATIENT
Start: 2023-04-17 | End: 2023-04-24

## 2023-04-17 NOTE — PROGRESS NOTES
Subjective:      Patient ID: Archana Kaur is a [de-identified] y.o. male. HPI patient presents with a 3+ day history of right ear discomfort. He denies any nasal congestion or drainage. He denies any hearing difficulty out of his right ear compared to the left ear. No fevers or chills. Review of Systems  Allergies   Allergen Reactions    Antihistamine [Diphenhydramine Hcl]      Unable to urinate due to enlarged prostate    Antihistamines, Chlorpheniramine-Type     Aspirin Other (See Comments)     325mg only. Hematuria. Diphenhydramine      Vitals:    04/17/23 1415   Pulse: 58   Temp: 97 °F (36.1 °C)   SpO2: 99%       Objective:   Physical Exam  Constitutional:       General: He is not in acute distress. HENT:      Right Ear: A middle ear effusion is present. Tympanic membrane is injected. Left Ear: Tympanic membrane normal.      Nose: Nose normal.      Mouth/Throat:      Pharynx: Uvula midline. Pulmonary:      Breath sounds: No decreased breath sounds. Musculoskeletal:      Cervical back: Neck supple. Lymphadenopathy:      Cervical: No cervical adenopathy. Neurological:      Mental Status: He is alert. Comments: Decreased hearing in general       Assessment:      Yeimi Bangura was seen today for otalgia. Diagnoses and all orders for this visit:    Acute otitis media, unspecified otitis media type    Other orders  -     amoxicillin (AMOXIL) 500 MG capsule; Take 2 capsules by mouth 2 times daily for 7 days            Plan:      Tylenol on a as needed basis    RTC as needed        Please note that this chart was generated using Dragon dictation software. Although every effort was made to ensure the accuracy of this automated transcription, some errors in transcription may have occurred.           Joslyn Bravo MD

## 2023-08-09 DIAGNOSIS — E78.2 MIXED HYPERLIPIDEMIA: ICD-10-CM

## 2023-08-09 RX ORDER — LOVASTATIN 20 MG/1
TABLET ORAL
Qty: 90 TABLET | Refills: 0 | Status: SHIPPED | OUTPATIENT
Start: 2023-08-09

## 2023-09-22 LAB
A/G RATIO: 1.7 (ref 1.2–2.2)
ALBUMIN SERPL-MCNC: 3.6 G/DL (ref 3.7–4.7)
ALP BLD-CCNC: 86 IU/L (ref 44–121)
ALT SERPL-CCNC: 11 IU/L (ref 0–44)
AST SERPL-CCNC: 14 IU/L (ref 0–40)
BASOPHILS ABSOLUTE: 0.1 X10E3/UL (ref 0–0.2)
BASOPHILS RELATIVE PERCENT: 1 %
BILIRUB SERPL-MCNC: 0.2 MG/DL (ref 0–1.2)
BUN / CREAT RATIO: 16 (ref 10–24)
BUN BLDV-MCNC: 25 MG/DL (ref 8–27)
CALCIUM SERPL-MCNC: 8.7 MG/DL (ref 8.6–10.2)
CHLORIDE BLD-SCNC: 110 MMOL/L (ref 96–106)
CHOLESTEROL, TOTAL: 139 MG/DL (ref 100–199)
CO2: 24 MMOL/L (ref 20–29)
COMMENT: NORMAL
CREAT SERPL-MCNC: 1.54 MG/DL (ref 0.76–1.27)
EOSINOPHILS ABSOLUTE: 0.4 X10E3/UL (ref 0–0.4)
EOSINOPHILS RELATIVE PERCENT: 5 %
ERYTHROCYTES, NUCLEATED/100 LEU: NORMAL
ESTIMATED GLOMERULAR FILTRATION RATE CREATININE EQUATION: 45 ML/MIN/1.73
GLOBULIN: 2.1 G/DL (ref 1.5–4.5)
GLUCOSE BLD-MCNC: 100 MG/DL (ref 70–99)
HCT VFR BLD CALC: 44.2 % (ref 37.5–51)
HDLC SERPL-MCNC: 45 MG/DL
HEMOGLOBIN: 14.4 G/DL (ref 13–17.7)
IMMATURE CELLS ABSOLUTE COUNT: NORMAL
IMMATURE GRANS (ABS): 0 X10E3/UL (ref 0–0.1)
IMMATURE GRANULOCYTES: 0 %
LDL CHOLESTEROL CALCULATED: 69 MG/DL (ref 0–99)
LYMPHOCYTES ABSOLUTE: 1.9 X10E3/UL (ref 0.7–3.1)
LYMPHOCYTES RELATIVE PERCENT: 27 %
MCH RBC QN AUTO: 30.6 PG (ref 26.6–33)
MCHC RBC AUTO-ENTMCNC: 32.6 G/DL (ref 31.5–35.7)
MCV RBC AUTO: 94 FL (ref 79–97)
MONOCYTES ABSOLUTE: 0.9 X10E3/UL (ref 0.1–0.9)
MONOCYTES RELATIVE PERCENT: 13 %
MORPHOLOGY: NORMAL
NEUTROPHILS ABSOLUTE: 3.7 X10E3/UL (ref 1.4–7)
PDW BLD-RTO: 13.4 % (ref 11.6–15.4)
PLATELET # BLD: 165 X10E3/UL (ref 150–450)
POTASSIUM SERPL-SCNC: 4.8 MMOL/L (ref 3.5–5.2)
RBC # BLD: 4.7 X10E6/UL (ref 4.14–5.8)
SEGMENTED NEUTROPHILS RELATIVE PERCENT: 54 %
SODIUM BLD-SCNC: 146 MMOL/L (ref 134–144)
TOTAL PROTEIN: 5.7 G/DL (ref 6–8.5)
TRIGL SERPL-MCNC: 141 MG/DL (ref 0–149)
VLDLC SERPL CALC-MCNC: 25 MG/DL (ref 5–40)
WBC # BLD: 7 X10E3/UL (ref 3.4–10.8)

## 2023-09-28 ENCOUNTER — OFFICE VISIT (OUTPATIENT)
Dept: FAMILY MEDICINE CLINIC | Age: 83
End: 2023-09-28
Payer: MEDICARE

## 2023-09-28 VITALS
DIASTOLIC BLOOD PRESSURE: 70 MMHG | TEMPERATURE: 97 F | RESPIRATION RATE: 16 BRPM | OXYGEN SATURATION: 98 % | SYSTOLIC BLOOD PRESSURE: 110 MMHG | BODY MASS INDEX: 24.91 KG/M2 | HEART RATE: 83 BPM | WEIGHT: 163.8 LBS

## 2023-09-28 DIAGNOSIS — I10 ESSENTIAL HYPERTENSION: ICD-10-CM

## 2023-09-28 DIAGNOSIS — E78.2 MIXED HYPERLIPIDEMIA: Primary | ICD-10-CM

## 2023-09-28 DIAGNOSIS — I25.10 CORONARY ARTERY DISEASE INVOLVING NATIVE HEART WITHOUT ANGINA PECTORIS, UNSPECIFIED VESSEL OR LESION TYPE: ICD-10-CM

## 2023-09-28 DIAGNOSIS — R35.0 BENIGN PROSTATIC HYPERPLASIA WITH URINARY FREQUENCY: ICD-10-CM

## 2023-09-28 DIAGNOSIS — N40.1 BENIGN PROSTATIC HYPERPLASIA WITH URINARY FREQUENCY: ICD-10-CM

## 2023-09-28 PROCEDURE — 1123F ACP DISCUSS/DSCN MKR DOCD: CPT | Performed by: FAMILY MEDICINE

## 2023-09-28 PROCEDURE — 3074F SYST BP LT 130 MM HG: CPT | Performed by: FAMILY MEDICINE

## 2023-09-28 PROCEDURE — 3078F DIAST BP <80 MM HG: CPT | Performed by: FAMILY MEDICINE

## 2023-09-28 PROCEDURE — G8420 CALC BMI NORM PARAMETERS: HCPCS | Performed by: FAMILY MEDICINE

## 2023-09-28 PROCEDURE — 1036F TOBACCO NON-USER: CPT | Performed by: FAMILY MEDICINE

## 2023-09-28 PROCEDURE — G8427 DOCREV CUR MEDS BY ELIG CLIN: HCPCS | Performed by: FAMILY MEDICINE

## 2023-09-28 PROCEDURE — 99214 OFFICE O/P EST MOD 30 MIN: CPT | Performed by: FAMILY MEDICINE

## 2023-09-28 ASSESSMENT — ENCOUNTER SYMPTOMS
BACK PAIN: 1
DIARRHEA: 0
RHINORRHEA: 0
CONSTIPATION: 1
SHORTNESS OF BREATH: 1

## 2023-09-28 NOTE — PROGRESS NOTES
SUBJECTIVE:    Jimmie Lopez is a 82 y.o. male who presents for a follow up visit. Chief Complaint   Patient presents with    Hypertension     No new concerns. Discuss shingles vaccine. Follow-up     Discuss labs. Hyperlipidemia  This is a chronic problem. The current episode started more than 1 year ago. Lipid results: Total cholesterol 139, triglycerides 141, HDL 45, LDL 69. Factors aggravating his hyperlipidemia include beta blockers. Associated symptoms include shortness of breath (CHAVEZ). Pertinent negatives include no chest pain. Current antihyperlipidemic treatment includes statins. The current treatment provides significant improvement of lipids. Compliance problems include adherence to exercise and adherence to diet. Risk factors for coronary artery disease include dyslipidemia, hypertension, male sex and a sedentary lifestyle. Hypertension  This is a chronic problem. The current episode started more than 1 year ago. The problem is controlled. Associated symptoms include shortness of breath (CHAVEZ). Pertinent negatives include no chest pain or palpitations. Risk factors for coronary artery disease include male gender and post-menopausal state. Past treatments include beta blockers. The current treatment provides significant improvement. Compliance problems include exercise and diet. Hypertensive end-organ damage includes kidney disease, CAD/MI and CVA. Patient's medications, allergies, past medical,surgical, social and family histories were reviewed and updated as appropriate.      Past Medical History:   Diagnosis Date    Acute, but ill-defined, cerebrovascular disease     Cancer ([address])     skin cancer of scalp - basal    Degeneration of cervical intervertebral disc     Degeneration of lumbar or lumbosacral intervertebral disc     Enlarged prostate     Irritable bowel syndrome     Proteinuria     Pure hypercholesterolemia     Unspecified cerebral artery occlusion with cerebral

## 2023-11-15 DIAGNOSIS — E78.2 MIXED HYPERLIPIDEMIA: ICD-10-CM

## 2023-11-15 RX ORDER — LOVASTATIN 20 MG/1
TABLET ORAL
Qty: 90 TABLET | Refills: 0 | Status: SHIPPED | OUTPATIENT
Start: 2023-11-15

## 2023-12-15 DIAGNOSIS — F51.01 PRIMARY INSOMNIA: ICD-10-CM

## 2023-12-15 RX ORDER — AMITRIPTYLINE HYDROCHLORIDE 50 MG/1
50 TABLET, FILM COATED ORAL NIGHTLY
Qty: 90 TABLET | Refills: 3 | Status: SHIPPED | OUTPATIENT
Start: 2023-12-15

## 2024-01-01 ENCOUNTER — APPOINTMENT (OUTPATIENT)
Age: 84
End: 2024-01-01
Attending: STUDENT IN AN ORGANIZED HEALTH CARE EDUCATION/TRAINING PROGRAM
Payer: MEDICARE

## 2024-01-01 ENCOUNTER — APPOINTMENT (OUTPATIENT)
Dept: CT IMAGING | Age: 84
End: 2024-01-01
Payer: MEDICARE

## 2024-01-01 ENCOUNTER — APPOINTMENT (OUTPATIENT)
Dept: GENERAL RADIOLOGY | Age: 84
End: 2024-01-01
Payer: MEDICARE

## 2024-01-01 ENCOUNTER — ANESTHESIA (OUTPATIENT)
Age: 84
End: 2024-01-01
Payer: MEDICARE

## 2024-01-01 ENCOUNTER — ANESTHESIA EVENT (OUTPATIENT)
Age: 84
End: 2024-01-01
Payer: MEDICARE

## 2024-01-01 ENCOUNTER — HOSPITAL ENCOUNTER (INPATIENT)
Age: 84
LOS: 17 days | Discharge: HOSPICE | End: 2024-10-11
Attending: INTERNAL MEDICINE | Admitting: STUDENT IN AN ORGANIZED HEALTH CARE EDUCATION/TRAINING PROGRAM
Payer: MEDICARE

## 2024-01-01 ENCOUNTER — NURSE ONLY (OUTPATIENT)
Dept: CARDIOLOGY CLINIC | Age: 84
End: 2024-01-01

## 2024-01-01 ENCOUNTER — APPOINTMENT (OUTPATIENT)
Dept: ULTRASOUND IMAGING | Age: 84
End: 2024-01-01
Payer: MEDICARE

## 2024-01-01 VITALS
TEMPERATURE: 98 F | HEART RATE: 85 BPM | SYSTOLIC BLOOD PRESSURE: 114 MMHG | HEIGHT: 68 IN | BODY MASS INDEX: 24.39 KG/M2 | DIASTOLIC BLOOD PRESSURE: 73 MMHG | WEIGHT: 160.94 LBS | OXYGEN SATURATION: 95 % | RESPIRATION RATE: 33 BRPM

## 2024-01-01 DIAGNOSIS — I50.20 HFREF (HEART FAILURE WITH REDUCED EJECTION FRACTION) (HCC): ICD-10-CM

## 2024-01-01 DIAGNOSIS — R55 SYNCOPE AND COLLAPSE: ICD-10-CM

## 2024-01-01 DIAGNOSIS — I47.20 V TACH (HCC): ICD-10-CM

## 2024-01-01 DIAGNOSIS — A41.9 SEPTICEMIA (HCC): ICD-10-CM

## 2024-01-01 DIAGNOSIS — I47.20 VENTRICULAR TACHYCARDIA (HCC): ICD-10-CM

## 2024-01-01 DIAGNOSIS — E87.6 HYPOKALEMIA: ICD-10-CM

## 2024-01-01 DIAGNOSIS — K81.0 ACUTE CHOLECYSTITIS: ICD-10-CM

## 2024-01-01 DIAGNOSIS — I47.20 VENTRICULAR TACHYCARDIA (HCC): Primary | ICD-10-CM

## 2024-01-01 DIAGNOSIS — G93.40 ACUTE ENCEPHALOPATHY: ICD-10-CM

## 2024-01-01 DIAGNOSIS — I21.3 STEMI (ST ELEVATION MYOCARDIAL INFARCTION) (HCC): ICD-10-CM

## 2024-01-01 DIAGNOSIS — Z95.810 ICD (IMPLANTABLE CARDIOVERTER-DEFIBRILLATOR), DUAL, IN SITU: Primary | ICD-10-CM

## 2024-01-01 LAB
ALBUMIN SERPL-MCNC: 2.4 G/DL (ref 3.4–5)
ALBUMIN SERPL-MCNC: 2.4 G/DL (ref 3.4–5)
ALBUMIN SERPL-MCNC: 2.7 G/DL (ref 3.4–5)
ALBUMIN SERPL-MCNC: 2.8 G/DL (ref 3.4–5)
ALBUMIN SERPL-MCNC: 3.3 G/DL (ref 3.4–5)
ALBUMIN/GLOB SERPL: 1.1 {RATIO} (ref 1.1–2.2)
ALP SERPL-CCNC: 114 U/L (ref 40–129)
ALP SERPL-CCNC: 134 U/L (ref 40–129)
ALP SERPL-CCNC: 146 U/L (ref 40–129)
ALP SERPL-CCNC: 72 U/L (ref 40–129)
ALP SERPL-CCNC: 79 U/L (ref 40–129)
ALT SERPL-CCNC: 10 U/L (ref 10–40)
ALT SERPL-CCNC: 11 U/L (ref 10–40)
ALT SERPL-CCNC: 14 U/L (ref 10–40)
ALT SERPL-CCNC: 15 U/L (ref 10–40)
ALT SERPL-CCNC: 30 U/L (ref 10–40)
AMIODARONE SERPL-MCNC: 0.5 UG/ML (ref 0.5–2)
ANION GAP SERPL CALCULATED.3IONS-SCNC: 10 MMOL/L (ref 3–16)
ANION GAP SERPL CALCULATED.3IONS-SCNC: 10 MMOL/L (ref 3–16)
ANION GAP SERPL CALCULATED.3IONS-SCNC: 11 MMOL/L (ref 3–16)
ANION GAP SERPL CALCULATED.3IONS-SCNC: 12 MMOL/L (ref 3–16)
ANION GAP SERPL CALCULATED.3IONS-SCNC: 12 MMOL/L (ref 3–16)
ANION GAP SERPL CALCULATED.3IONS-SCNC: 13 MMOL/L (ref 3–16)
ANION GAP SERPL CALCULATED.3IONS-SCNC: 14 MMOL/L (ref 3–16)
ANION GAP SERPL CALCULATED.3IONS-SCNC: 15 MMOL/L (ref 3–16)
ANION GAP SERPL CALCULATED.3IONS-SCNC: 18 MMOL/L (ref 3–16)
ANION GAP SERPL CALCULATED.3IONS-SCNC: 8 MMOL/L (ref 3–16)
ANION GAP SERPL CALCULATED.3IONS-SCNC: 9 MMOL/L (ref 3–16)
ANION GAP SERPL CALCULATED.3IONS-SCNC: 9 MMOL/L (ref 3–16)
ANISOCYTOSIS BLD QL SMEAR: ABNORMAL
ANTI-XA UNFRAC HEPARIN: 0.88 IU/ML (ref 0.3–0.7)
APTT BLD: 33.3 SEC (ref 22.1–36.4)
AST SERPL-CCNC: 16 U/L (ref 15–37)
AST SERPL-CCNC: 17 U/L (ref 15–37)
AST SERPL-CCNC: 18 U/L (ref 15–37)
AST SERPL-CCNC: 21 U/L (ref 15–37)
AST SERPL-CCNC: 24 U/L (ref 15–37)
BACTERIA BLD CULT ORG #2: NORMAL
BACTERIA BLD CULT: ABNORMAL
BACTERIA BLD CULT: ABNORMAL
BACTERIA BLD CULT: NORMAL
BACTERIA URNS QL MICRO: ABNORMAL /HPF
BASE EXCESS BLDA CALC-SCNC: -4 MMOL/L (ref -3–3)
BASE EXCESS BLDA CALC-SCNC: -5 MMOL/L (ref -3–3)
BASE EXCESS BLDA CALC-SCNC: -5 MMOL/L (ref -3–3)
BASOPHILS # BLD: 0 K/UL (ref 0–0.2)
BASOPHILS # BLD: 0.1 K/UL (ref 0–0.2)
BASOPHILS NFR BLD: 0 %
BASOPHILS NFR BLD: 0.1 %
BASOPHILS NFR BLD: 0.2 %
BASOPHILS NFR BLD: 0.3 %
BASOPHILS NFR BLD: 0.3 %
BASOPHILS NFR BLD: 0.4 %
BASOPHILS NFR BLD: 0.4 %
BASOPHILS NFR BLD: 0.5 %
BASOPHILS NFR BLD: 0.5 %
BASOPHILS NFR BLD: 0.6 %
BASOPHILS NFR BLD: 0.8 %
BASOPHILS NFR BLD: 0.8 %
BILIRUB DIRECT SERPL-MCNC: 0.3 MG/DL (ref 0–0.3)
BILIRUB DIRECT SERPL-MCNC: 0.5 MG/DL (ref 0–0.3)
BILIRUB DIRECT SERPL-MCNC: 0.6 MG/DL (ref 0–0.3)
BILIRUB DIRECT SERPL-MCNC: 1.2 MG/DL (ref 0–0.3)
BILIRUB INDIRECT SERPL-MCNC: 0.3 MG/DL (ref 0–1)
BILIRUB INDIRECT SERPL-MCNC: 0.4 MG/DL (ref 0–1)
BILIRUB INDIRECT SERPL-MCNC: 0.4 MG/DL (ref 0–1)
BILIRUB INDIRECT SERPL-MCNC: 0.6 MG/DL (ref 0–1)
BILIRUB SERPL-MCNC: 0.6 MG/DL (ref 0–1)
BILIRUB SERPL-MCNC: 0.9 MG/DL (ref 0–1)
BILIRUB SERPL-MCNC: 0.9 MG/DL (ref 0–1)
BILIRUB SERPL-MCNC: 1 MG/DL (ref 0–1)
BILIRUB SERPL-MCNC: 1.5 MG/DL (ref 0–1)
BILIRUB UR QL STRIP.AUTO: NEGATIVE
BUN SERPL-MCNC: 15 MG/DL (ref 7–20)
BUN SERPL-MCNC: 15 MG/DL (ref 7–20)
BUN SERPL-MCNC: 16 MG/DL (ref 7–20)
BUN SERPL-MCNC: 17 MG/DL (ref 7–20)
BUN SERPL-MCNC: 18 MG/DL (ref 7–20)
BUN SERPL-MCNC: 19 MG/DL (ref 7–20)
BUN SERPL-MCNC: 19 MG/DL (ref 7–20)
BUN SERPL-MCNC: 20 MG/DL (ref 7–20)
BUN SERPL-MCNC: 20 MG/DL (ref 7–20)
BUN SERPL-MCNC: 21 MG/DL (ref 7–20)
BUN SERPL-MCNC: 22 MG/DL (ref 7–20)
BUN SERPL-MCNC: 23 MG/DL (ref 7–20)
BUN SERPL-MCNC: 28 MG/DL (ref 7–20)
CA-I BLD-SCNC: 1.15 MMOL/L (ref 1.12–1.32)
CA-I BLD-SCNC: 1.24 MMOL/L (ref 1.12–1.32)
CALCIUM SERPL-MCNC: 7.7 MG/DL (ref 8.3–10.6)
CALCIUM SERPL-MCNC: 7.9 MG/DL (ref 8.3–10.6)
CALCIUM SERPL-MCNC: 8 MG/DL (ref 8.3–10.6)
CALCIUM SERPL-MCNC: 8.1 MG/DL (ref 8.3–10.6)
CALCIUM SERPL-MCNC: 8.1 MG/DL (ref 8.3–10.6)
CALCIUM SERPL-MCNC: 8.2 MG/DL (ref 8.3–10.6)
CALCIUM SERPL-MCNC: 8.3 MG/DL (ref 8.3–10.6)
CALCIUM SERPL-MCNC: 8.3 MG/DL (ref 8.3–10.6)
CALCIUM SERPL-MCNC: 8.4 MG/DL (ref 8.3–10.6)
CHLORIDE BLD-SCNC: 114 MMOL/L (ref 99–110)
CHLORIDE SERPL-SCNC: 103 MMOL/L (ref 99–110)
CHLORIDE SERPL-SCNC: 104 MMOL/L (ref 99–110)
CHLORIDE SERPL-SCNC: 105 MMOL/L (ref 99–110)
CHLORIDE SERPL-SCNC: 106 MMOL/L (ref 99–110)
CHLORIDE SERPL-SCNC: 107 MMOL/L (ref 99–110)
CHLORIDE SERPL-SCNC: 108 MMOL/L (ref 99–110)
CHLORIDE SERPL-SCNC: 108 MMOL/L (ref 99–110)
CHLORIDE SERPL-SCNC: 109 MMOL/L (ref 99–110)
CHLORIDE SERPL-SCNC: 111 MMOL/L (ref 99–110)
CK SERPL-CCNC: 91 U/L (ref 39–308)
CLARITY UR: CLEAR
CO2 BLDA-SCNC: 23 MMOL/L
CO2 BLDA-SCNC: 23 MMOL/L
CO2 BLDA-SCNC: 49.1 MMOL/L
CO2 SERPL-SCNC: 17 MMOL/L (ref 21–32)
CO2 SERPL-SCNC: 18 MMOL/L (ref 21–32)
CO2 SERPL-SCNC: 18 MMOL/L (ref 21–32)
CO2 SERPL-SCNC: 19 MMOL/L (ref 21–32)
CO2 SERPL-SCNC: 20 MMOL/L (ref 21–32)
CO2 SERPL-SCNC: 21 MMOL/L (ref 21–32)
CO2 SERPL-SCNC: 22 MMOL/L (ref 21–32)
CO2 SERPL-SCNC: 23 MMOL/L (ref 21–32)
CO2 SERPL-SCNC: 23 MMOL/L (ref 21–32)
CO2 SERPL-SCNC: 24 MMOL/L (ref 21–32)
CO2 SERPL-SCNC: 25 MMOL/L (ref 21–32)
COHGB MFR BLDA: 0.7 % (ref 0–1.5)
COLOR UR: ABNORMAL
CREAT SERPL-MCNC: 1 MG/DL (ref 0.8–1.3)
CREAT SERPL-MCNC: 1 MG/DL (ref 0.8–1.3)
CREAT SERPL-MCNC: 1.1 MG/DL (ref 0.8–1.3)
CREAT SERPL-MCNC: 1.2 MG/DL (ref 0.8–1.3)
CREAT SERPL-MCNC: 1.3 MG/DL (ref 0.8–1.3)
CREAT SERPL-MCNC: 1.4 MG/DL (ref 0.8–1.3)
CREAT SERPL-MCNC: 1.6 MG/DL (ref 0.8–1.3)
DEPRECATED RDW RBC AUTO: 14.8 % (ref 12.4–15.4)
DEPRECATED RDW RBC AUTO: 14.9 % (ref 12.4–15.4)
DEPRECATED RDW RBC AUTO: 15 % (ref 12.4–15.4)
DEPRECATED RDW RBC AUTO: 15.1 % (ref 12.4–15.4)
DEPRECATED RDW RBC AUTO: 15.3 % (ref 12.4–15.4)
DEPRECATED RDW RBC AUTO: 15.3 % (ref 12.4–15.4)
DEPRECATED RDW RBC AUTO: 15.4 % (ref 12.4–15.4)
DEPRECATED RDW RBC AUTO: 15.4 % (ref 12.4–15.4)
DEPRECATED RDW RBC AUTO: 15.5 % (ref 12.4–15.4)
DEPRECATED RDW RBC AUTO: 15.6 % (ref 12.4–15.4)
DEPRECATED RDW RBC AUTO: 15.8 % (ref 12.4–15.4)
DESETHYLAMIODARONE SERPL-MCNC: 0.4 UG/ML
ECHO AO ASC DIAM: 3.4 CM
ECHO AO ASCENDING AORTA INDEX: 1.83 CM/M2
ECHO AV MEAN GRADIENT: 10 MMHG
ECHO AV MEAN VELOCITY: 1.5 M/S
ECHO AV PEAK GRADIENT: 19 MMHG
ECHO AV PEAK VELOCITY: 2.2 M/S
ECHO AV VTI: 44.9 CM
ECHO BSA: 1.87 M2
ECHO BSA: 1.87 M2
ECHO BSA: 1.93 M2
ECHO LA AREA 2C: 28.2 CM2
ECHO LA AREA 4C: 24.8 CM2
ECHO LA MAJOR AXIS: 6 CM
ECHO LA MINOR AXIS: 6.3 CM
ECHO LA VOL BP: 94 ML (ref 18–58)
ECHO LA VOL MOD A2C: 103 ML (ref 18–58)
ECHO LA VOL MOD A4C: 83 ML (ref 18–58)
ECHO LA VOL/BSA BIPLANE: 51 ML/M2 (ref 16–34)
ECHO LA VOLUME INDEX MOD A2C: 55 ML/M2 (ref 16–34)
ECHO LA VOLUME INDEX MOD A4C: 45 ML/M2 (ref 16–34)
ECHO LV EDV A2C: 124 ML
ECHO LV EDV A4C: 147 ML
ECHO LV EDV INDEX A4C: 79 ML/M2
ECHO LV EDV NDEX A2C: 67 ML/M2
ECHO LV EJECTION FRACTION A2C: 47 %
ECHO LV EJECTION FRACTION A4C: 36 %
ECHO LV EJECTION FRACTION BIPLANE: 35 % (ref 55–100)
ECHO LV ESV A2C: 66 ML
ECHO LV ESV A4C: 94 ML
ECHO LV ESV INDEX A2C: 35 ML/M2
ECHO LV ESV INDEX A4C: 51 ML/M2
ECHO LV FRACTIONAL SHORTENING: 24 % (ref 28–44)
ECHO LV INTERNAL DIMENSION DIASTOLE INDEX: 2.9 CM/M2
ECHO LV INTERNAL DIMENSION DIASTOLIC: 5.4 CM (ref 4.2–5.9)
ECHO LV INTERNAL DIMENSION SYSTOLIC INDEX: 2.2 CM/M2
ECHO LV INTERNAL DIMENSION SYSTOLIC: 4.1 CM
ECHO LV IVSD: 0.9 CM (ref 0.6–1)
ECHO LV MASS 2D: 180.1 G (ref 88–224)
ECHO LV MASS INDEX 2D: 96.8 G/M2 (ref 49–115)
ECHO LV POSTERIOR WALL DIASTOLIC: 0.9 CM (ref 0.6–1)
ECHO LV RELATIVE WALL THICKNESS RATIO: 0.33
ECHO LVOT AREA: 3.8 CM2
ECHO LVOT DIAM: 2.2 CM
ECHO MV MAX VELOCITY: 1.9 M/S
ECHO MV MEAN GRADIENT: 6 MMHG
ECHO MV MEAN VELOCITY: 1.2 M/S
ECHO MV PEAK GRADIENT: 15 MMHG
ECHO MV VTI: 55.5 CM
ECHO PULMONARY ARTERY END DIASTOLIC PRESSURE: 7 MMHG
ECHO PV MAX VELOCITY: 0.9 M/S
ECHO PV PEAK GRADIENT: 3 MMHG
ECHO PV REGURGITANT MAX VELOCITY: 1.3 M/S
ECHO RA AREA 4C: 25.7 CM2
ECHO RA END SYSTOLIC VOLUME APICAL 4 CHAMBER INDEX BSA: 45 ML/M2
ECHO RA VOLUME: 83 ML
ECHO RV BASAL DIMENSION: 5.4 CM
ECHO RV FREE WALL PEAK S': 7 CM/S
ECHO RV LONGITUDINAL DIMENSION: 9.2 CM
ECHO RV MID DIMENSION: 3.5 CM
ECHO RV TAPSE: 1.1 CM (ref 1.7–?)
ECHO TV REGURGITANT MAX VELOCITY: 2.86 M/S
ECHO TV REGURGITANT PEAK GRADIENT: 33 MMHG
EKG ATRIAL RATE: 101 BPM
EKG ATRIAL RATE: 187 BPM
EKG DIAGNOSIS: NORMAL
EKG DIAGNOSIS: NORMAL
EKG P AXIS: 27 DEGREES
EKG P-R INTERVAL: 236 MS
EKG Q-T INTERVAL: 344 MS
EKG Q-T INTERVAL: 362 MS
EKG QRS DURATION: 148 MS
EKG QRS DURATION: 224 MS
EKG QTC CALCULATION (BAZETT): 466 MS
EKG QTC CALCULATION (BAZETT): 602 MS
EKG R AXIS: -28 DEGREES
EKG R AXIS: -47 DEGREES
EKG T AXIS: 12 DEGREES
EKG T AXIS: 214 DEGREES
EKG VENTRICULAR RATE: 100 BPM
EKG VENTRICULAR RATE: 184 BPM
EOSINOPHIL # BLD: 0 K/UL (ref 0–0.6)
EOSINOPHIL # BLD: 0.1 K/UL (ref 0–0.6)
EOSINOPHIL # BLD: 0.1 K/UL (ref 0–0.6)
EOSINOPHIL # BLD: 0.2 K/UL (ref 0–0.6)
EOSINOPHIL # BLD: 0.3 K/UL (ref 0–0.6)
EOSINOPHIL # BLD: 0.4 K/UL (ref 0–0.6)
EOSINOPHIL # BLD: 0.5 K/UL (ref 0–0.6)
EOSINOPHIL # BLD: 0.5 K/UL (ref 0–0.6)
EOSINOPHIL # BLD: 0.6 K/UL (ref 0–0.6)
EOSINOPHIL # BLD: 0.6 K/UL (ref 0–0.6)
EOSINOPHIL # BLD: 0.7 K/UL (ref 0–0.6)
EOSINOPHIL NFR BLD: 0 %
EOSINOPHIL NFR BLD: 0 %
EOSINOPHIL NFR BLD: 0.2 %
EOSINOPHIL NFR BLD: 0.6 %
EOSINOPHIL NFR BLD: 1 %
EOSINOPHIL NFR BLD: 1.1 %
EOSINOPHIL NFR BLD: 1.3 %
EOSINOPHIL NFR BLD: 1.7 %
EOSINOPHIL NFR BLD: 1.9 %
EOSINOPHIL NFR BLD: 2.8 %
EOSINOPHIL NFR BLD: 3.4 %
EOSINOPHIL NFR BLD: 3.7 %
EOSINOPHIL NFR BLD: 3.8 %
EOSINOPHIL NFR BLD: 4.3 %
EOSINOPHIL NFR BLD: 5 %
EOSINOPHIL NFR BLD: 5.6 %
EOSINOPHIL NFR BLD: 7.2 %
EOSINOPHIL NFR BLD: 8.9 %
EPI CELLS #/AREA URNS AUTO: 0 /HPF (ref 0–5)
GFR SERPLBLD CREATININE-BSD FMLA CKD-EPI: 43 ML/MIN/{1.73_M2}
GFR SERPLBLD CREATININE-BSD FMLA CKD-EPI: 50 ML/MIN/{1.73_M2}
GFR SERPLBLD CREATININE-BSD FMLA CKD-EPI: 55 ML/MIN/{1.73_M2}
GFR SERPLBLD CREATININE-BSD FMLA CKD-EPI: 60 ML/MIN/{1.73_M2}
GFR SERPLBLD CREATININE-BSD FMLA CKD-EPI: 67 ML/MIN/{1.73_M2}
GFR SERPLBLD CREATININE-BSD FMLA CKD-EPI: 75 ML/MIN/{1.73_M2}
GFR SERPLBLD CREATININE-BSD FMLA CKD-EPI: 75 ML/MIN/{1.73_M2}
GLUCOSE BLD-MCNC: 108 MG/DL (ref 70–99)
GLUCOSE BLD-MCNC: 179 MG/DL (ref 70–99)
GLUCOSE SERPL-MCNC: 101 MG/DL (ref 70–99)
GLUCOSE SERPL-MCNC: 101 MG/DL (ref 70–99)
GLUCOSE SERPL-MCNC: 103 MG/DL (ref 70–99)
GLUCOSE SERPL-MCNC: 104 MG/DL (ref 70–99)
GLUCOSE SERPL-MCNC: 104 MG/DL (ref 70–99)
GLUCOSE SERPL-MCNC: 109 MG/DL (ref 70–99)
GLUCOSE SERPL-MCNC: 110 MG/DL (ref 70–99)
GLUCOSE SERPL-MCNC: 120 MG/DL (ref 70–99)
GLUCOSE SERPL-MCNC: 124 MG/DL (ref 70–99)
GLUCOSE SERPL-MCNC: 125 MG/DL (ref 70–99)
GLUCOSE SERPL-MCNC: 126 MG/DL (ref 70–99)
GLUCOSE SERPL-MCNC: 136 MG/DL (ref 70–99)
GLUCOSE SERPL-MCNC: 145 MG/DL (ref 70–99)
GLUCOSE SERPL-MCNC: 165 MG/DL (ref 70–99)
GLUCOSE SERPL-MCNC: 83 MG/DL (ref 70–99)
GLUCOSE SERPL-MCNC: 98 MG/DL (ref 70–99)
GLUCOSE SERPL-MCNC: 98 MG/DL (ref 70–99)
GLUCOSE SERPL-MCNC: 99 MG/DL (ref 70–99)
GLUCOSE UR STRIP.AUTO-MCNC: >=1000 MG/DL
HCO3 BLDA-SCNC: 20.8 MMOL/L (ref 21–29)
HCO3 BLDA-SCNC: 21.2 MMOL/L (ref 21–29)
HCO3 BLDA-SCNC: 21.2 MMOL/L (ref 21–29)
HCT VFR BLD AUTO: 24.9 % (ref 40.5–52.5)
HCT VFR BLD AUTO: 27.9 % (ref 40.5–52.5)
HCT VFR BLD AUTO: 28.6 % (ref 40.5–52.5)
HCT VFR BLD AUTO: 28.9 % (ref 40.5–52.5)
HCT VFR BLD AUTO: 29.9 % (ref 40.5–52.5)
HCT VFR BLD AUTO: 30.4 % (ref 40.5–52.5)
HCT VFR BLD AUTO: 30.8 % (ref 40.5–52.5)
HCT VFR BLD AUTO: 33.1 % (ref 40.5–52.5)
HCT VFR BLD AUTO: 34 % (ref 40.5–52.5)
HCT VFR BLD AUTO: 34.8 % (ref 40.5–52.5)
HCT VFR BLD AUTO: 35.9 % (ref 40.5–52.5)
HCT VFR BLD AUTO: 36.3 % (ref 40.5–52.5)
HCT VFR BLD AUTO: 36.8 % (ref 40.5–52.5)
HCT VFR BLD AUTO: 36.8 % (ref 40.5–52.5)
HCT VFR BLD AUTO: 37.8 % (ref 40.5–52.5)
HCT VFR BLD AUTO: 38 % (ref 40.5–52.5)
HCT VFR BLD AUTO: 38.7 % (ref 40.5–52.5)
HCT VFR BLD AUTO: 39.7 % (ref 40.5–52.5)
HCT VFR BLD AUTO: 43 % (ref 40.5–52.5)
HCT VFR BLD AUTO: 44 % (ref 40.5–52.5)
HCT VFR BLD AUTO: 44.1 % (ref 40.5–52.5)
HCT VFR BLD AUTO: 44.2 % (ref 40.5–52.5)
HGB BLD CALC-MCNC: 12.8 GM/DL (ref 13.5–17.5)
HGB BLD CALC-MCNC: 14.6 GM/DL (ref 13.5–17.5)
HGB BLD-MCNC: 10.1 G/DL (ref 13.5–17.5)
HGB BLD-MCNC: 10.2 G/DL (ref 13.5–17.5)
HGB BLD-MCNC: 10.2 G/DL (ref 13.5–17.5)
HGB BLD-MCNC: 11.1 G/DL (ref 13.5–17.5)
HGB BLD-MCNC: 11.3 G/DL (ref 13.5–17.5)
HGB BLD-MCNC: 11.5 G/DL (ref 13.5–17.5)
HGB BLD-MCNC: 12.1 G/DL (ref 13.5–17.5)
HGB BLD-MCNC: 12.3 G/DL (ref 13.5–17.5)
HGB BLD-MCNC: 12.4 G/DL (ref 13.5–17.5)
HGB BLD-MCNC: 12.4 G/DL (ref 13.5–17.5)
HGB BLD-MCNC: 13 G/DL (ref 13.5–17.5)
HGB BLD-MCNC: 13 G/DL (ref 13.5–17.5)
HGB BLD-MCNC: 13.1 G/DL (ref 13.5–17.5)
HGB BLD-MCNC: 14.1 G/DL (ref 13.5–17.5)
HGB BLD-MCNC: 14.3 G/DL (ref 13.5–17.5)
HGB BLD-MCNC: 14.4 G/DL (ref 13.5–17.5)
HGB BLD-MCNC: 8.1 G/DL (ref 13.5–17.5)
HGB BLD-MCNC: 9.3 G/DL (ref 13.5–17.5)
HGB BLD-MCNC: 9.4 G/DL (ref 13.5–17.5)
HGB BLD-MCNC: 9.6 G/DL (ref 13.5–17.5)
HGB BLDA-MCNC: 14 G/DL (ref 13.5–17.5)
HGB UR QL STRIP.AUTO: ABNORMAL
HYALINE CASTS #/AREA URNS AUTO: 0 /LPF (ref 0–8)
INR PPP: 1.04 (ref 0.85–1.15)
KETONES UR STRIP.AUTO-MCNC: ABNORMAL MG/DL
LACTATE BLD-SCNC: 0.58 MMOL/L (ref 0.4–2)
LACTATE BLD-SCNC: 0.77 MMOL/L (ref 0.4–2)
LACTATE BLDV-SCNC: 1 MMOL/L (ref 0.4–2)
LACTATE BLDV-SCNC: 1 MMOL/L (ref 0.4–2)
LACTATE BLDV-SCNC: 1.1 MMOL/L (ref 0.4–2)
LACTATE BLDV-SCNC: 2.8 MMOL/L (ref 0.4–1.9)
LEUKOCYTE ESTERASE UR QL STRIP.AUTO: NEGATIVE
LIPASE SERPL-CCNC: 12 U/L (ref 13–60)
LYMPHOCYTES # BLD: 0.2 K/UL (ref 1–5.1)
LYMPHOCYTES # BLD: 0.5 K/UL (ref 1–5.1)
LYMPHOCYTES # BLD: 0.5 K/UL (ref 1–5.1)
LYMPHOCYTES # BLD: 0.6 K/UL (ref 1–5.1)
LYMPHOCYTES # BLD: 0.7 K/UL (ref 1–5.1)
LYMPHOCYTES # BLD: 0.8 K/UL (ref 1–5.1)
LYMPHOCYTES # BLD: 0.9 K/UL (ref 1–5.1)
LYMPHOCYTES # BLD: 0.9 K/UL (ref 1–5.1)
LYMPHOCYTES # BLD: 1 K/UL (ref 1–5.1)
LYMPHOCYTES # BLD: 1.2 K/UL (ref 1–5.1)
LYMPHOCYTES NFR BLD: 1 %
LYMPHOCYTES NFR BLD: 12.7 %
LYMPHOCYTES NFR BLD: 3.7 %
LYMPHOCYTES NFR BLD: 3.9 %
LYMPHOCYTES NFR BLD: 4 %
LYMPHOCYTES NFR BLD: 4.6 %
LYMPHOCYTES NFR BLD: 4.6 %
LYMPHOCYTES NFR BLD: 5.3 %
LYMPHOCYTES NFR BLD: 5.9 %
LYMPHOCYTES NFR BLD: 5.9 %
LYMPHOCYTES NFR BLD: 6 %
LYMPHOCYTES NFR BLD: 6.8 %
LYMPHOCYTES NFR BLD: 6.9 %
LYMPHOCYTES NFR BLD: 7 %
LYMPHOCYTES NFR BLD: 8 %
LYMPHOCYTES NFR BLD: 8.1 %
LYMPHOCYTES NFR BLD: 8.4 %
LYMPHOCYTES NFR BLD: 9.7 %
MAGNESIUM SERPL-MCNC: 1.7 MG/DL (ref 1.8–2.4)
MAGNESIUM SERPL-MCNC: 1.7 MG/DL (ref 1.8–2.4)
MAGNESIUM SERPL-MCNC: 1.8 MG/DL (ref 1.8–2.4)
MAGNESIUM SERPL-MCNC: 1.8 MG/DL (ref 1.8–2.4)
MAGNESIUM SERPL-MCNC: 1.9 MG/DL (ref 1.8–2.4)
MAGNESIUM SERPL-MCNC: 1.97 MG/DL (ref 1.8–2.4)
MAGNESIUM SERPL-MCNC: 2 MG/DL (ref 1.8–2.4)
MAGNESIUM SERPL-MCNC: 2.01 MG/DL (ref 1.8–2.4)
MAGNESIUM SERPL-MCNC: 2.1 MG/DL (ref 1.8–2.4)
MAGNESIUM SERPL-MCNC: 2.7 MG/DL (ref 1.8–2.4)
MCH RBC QN AUTO: 30.1 PG (ref 26–34)
MCH RBC QN AUTO: 30.4 PG (ref 26–34)
MCH RBC QN AUTO: 30.6 PG (ref 26–34)
MCH RBC QN AUTO: 30.8 PG (ref 26–34)
MCH RBC QN AUTO: 30.8 PG (ref 26–34)
MCH RBC QN AUTO: 30.9 PG (ref 26–34)
MCH RBC QN AUTO: 30.9 PG (ref 26–34)
MCH RBC QN AUTO: 31.1 PG (ref 26–34)
MCH RBC QN AUTO: 31.2 PG (ref 26–34)
MCH RBC QN AUTO: 31.3 PG (ref 26–34)
MCH RBC QN AUTO: 31.4 PG (ref 26–34)
MCH RBC QN AUTO: 31.5 PG (ref 26–34)
MCH RBC QN AUTO: 31.6 PG (ref 26–34)
MCH RBC QN AUTO: 31.7 PG (ref 26–34)
MCH RBC QN AUTO: 31.8 PG (ref 26–34)
MCH RBC QN AUTO: 31.8 PG (ref 26–34)
MCHC RBC AUTO-ENTMCNC: 32 G/DL (ref 31–36)
MCHC RBC AUTO-ENTMCNC: 32.4 G/DL (ref 31–36)
MCHC RBC AUTO-ENTMCNC: 32.5 G/DL (ref 31–36)
MCHC RBC AUTO-ENTMCNC: 32.6 G/DL (ref 31–36)
MCHC RBC AUTO-ENTMCNC: 32.7 G/DL (ref 31–36)
MCHC RBC AUTO-ENTMCNC: 32.7 G/DL (ref 31–36)
MCHC RBC AUTO-ENTMCNC: 32.9 G/DL (ref 31–36)
MCHC RBC AUTO-ENTMCNC: 32.9 G/DL (ref 31–36)
MCHC RBC AUTO-ENTMCNC: 33 G/DL (ref 31–36)
MCHC RBC AUTO-ENTMCNC: 33.2 G/DL (ref 31–36)
MCHC RBC AUTO-ENTMCNC: 33.2 G/DL (ref 31–36)
MCHC RBC AUTO-ENTMCNC: 33.5 G/DL (ref 31–36)
MCHC RBC AUTO-ENTMCNC: 33.6 G/DL (ref 31–36)
MCHC RBC AUTO-ENTMCNC: 33.7 G/DL (ref 31–36)
MCHC RBC AUTO-ENTMCNC: 33.8 G/DL (ref 31–36)
MCHC RBC AUTO-ENTMCNC: 33.8 G/DL (ref 31–36)
MCHC RBC AUTO-ENTMCNC: 34.3 G/DL (ref 31–36)
MCHC RBC AUTO-ENTMCNC: 34.6 G/DL (ref 31–36)
MCV RBC AUTO: 92 FL (ref 80–100)
MCV RBC AUTO: 92.6 FL (ref 80–100)
MCV RBC AUTO: 92.7 FL (ref 80–100)
MCV RBC AUTO: 92.9 FL (ref 80–100)
MCV RBC AUTO: 92.9 FL (ref 80–100)
MCV RBC AUTO: 93.1 FL (ref 80–100)
MCV RBC AUTO: 93.6 FL (ref 80–100)
MCV RBC AUTO: 93.6 FL (ref 80–100)
MCV RBC AUTO: 93.7 FL (ref 80–100)
MCV RBC AUTO: 93.7 FL (ref 80–100)
MCV RBC AUTO: 93.9 FL (ref 80–100)
MCV RBC AUTO: 93.9 FL (ref 80–100)
MCV RBC AUTO: 94.1 FL (ref 80–100)
MCV RBC AUTO: 94.1 FL (ref 80–100)
MCV RBC AUTO: 94.4 FL (ref 80–100)
MCV RBC AUTO: 94.5 FL (ref 80–100)
MCV RBC AUTO: 95 FL (ref 80–100)
MCV RBC AUTO: 95.1 FL (ref 80–100)
METHGB MFR BLDA: 0.4 %
MONOCYTES # BLD: 0.6 K/UL (ref 0–1.3)
MONOCYTES # BLD: 0.8 K/UL (ref 0–1.3)
MONOCYTES # BLD: 0.9 K/UL (ref 0–1.3)
MONOCYTES # BLD: 1 K/UL (ref 0–1.3)
MONOCYTES # BLD: 1.1 K/UL (ref 0–1.3)
MONOCYTES # BLD: 1.2 K/UL (ref 0–1.3)
MONOCYTES # BLD: 1.3 K/UL (ref 0–1.3)
MONOCYTES # BLD: 1.4 K/UL (ref 0–1.3)
MONOCYTES NFR BLD: 10.5 %
MONOCYTES NFR BLD: 11.8 %
MONOCYTES NFR BLD: 12.2 %
MONOCYTES NFR BLD: 4 %
MONOCYTES NFR BLD: 6.4 %
MONOCYTES NFR BLD: 6.4 %
MONOCYTES NFR BLD: 6.5 %
MONOCYTES NFR BLD: 7.2 %
MONOCYTES NFR BLD: 7.3 %
MONOCYTES NFR BLD: 7.4 %
MONOCYTES NFR BLD: 7.9 %
MONOCYTES NFR BLD: 8.4 %
MONOCYTES NFR BLD: 8.6 %
MONOCYTES NFR BLD: 8.8 %
MONOCYTES NFR BLD: 9.2 %
MONOCYTES NFR BLD: 9.6 %
MRSA DNA SPEC QL NAA+PROBE: NORMAL
NEUTROPHILS # BLD: 10.1 K/UL (ref 1.7–7.7)
NEUTROPHILS # BLD: 11.1 K/UL (ref 1.7–7.7)
NEUTROPHILS # BLD: 11.8 K/UL (ref 1.7–7.7)
NEUTROPHILS # BLD: 11.9 K/UL (ref 1.7–7.7)
NEUTROPHILS # BLD: 12.2 K/UL (ref 1.7–7.7)
NEUTROPHILS # BLD: 12.3 K/UL (ref 1.7–7.7)
NEUTROPHILS # BLD: 12.5 K/UL (ref 1.7–7.7)
NEUTROPHILS # BLD: 13.2 K/UL (ref 1.7–7.7)
NEUTROPHILS # BLD: 14.5 K/UL (ref 1.7–7.7)
NEUTROPHILS # BLD: 14.6 K/UL (ref 1.7–7.7)
NEUTROPHILS # BLD: 5.1 K/UL (ref 1.7–7.7)
NEUTROPHILS # BLD: 5.7 K/UL (ref 1.7–7.7)
NEUTROPHILS # BLD: 6.4 K/UL (ref 1.7–7.7)
NEUTROPHILS # BLD: 8.9 K/UL (ref 1.7–7.7)
NEUTROPHILS # BLD: 9.1 K/UL (ref 1.7–7.7)
NEUTROPHILS # BLD: 9.2 K/UL (ref 1.7–7.7)
NEUTROPHILS # BLD: 9.3 K/UL (ref 1.7–7.7)
NEUTROPHILS # BLD: 9.4 K/UL (ref 1.7–7.7)
NEUTROPHILS NFR BLD: 65.8 %
NEUTROPHILS NFR BLD: 70.3 %
NEUTROPHILS NFR BLD: 76.1 %
NEUTROPHILS NFR BLD: 77.9 %
NEUTROPHILS NFR BLD: 79 %
NEUTROPHILS NFR BLD: 79.4 %
NEUTROPHILS NFR BLD: 82.3 %
NEUTROPHILS NFR BLD: 82.5 %
NEUTROPHILS NFR BLD: 82.6 %
NEUTROPHILS NFR BLD: 83.7 %
NEUTROPHILS NFR BLD: 83.8 %
NEUTROPHILS NFR BLD: 86 %
NEUTROPHILS NFR BLD: 86.1 %
NEUTROPHILS NFR BLD: 87.6 %
NEUTROPHILS NFR BLD: 88.2 %
NEUTROPHILS NFR BLD: 88.2 %
NEUTROPHILS NFR BLD: 89.4 %
NEUTROPHILS NFR BLD: 94 %
NITRITE UR QL STRIP.AUTO: NEGATIVE
NT-PROBNP SERPL-MCNC: 3322 PG/ML (ref 0–449)
NT-PROBNP SERPL-MCNC: 3901 PG/ML (ref 0–449)
O2 THERAPY: ABNORMAL
ORGANISM: ABNORMAL
ORGANISM: ABNORMAL
OVALOCYTES BLD QL SMEAR: ABNORMAL
PCO2 BLDA: 36.4 MMHG (ref 35–45)
PCO2 BLDA: 43 MM HG (ref 35–45)
PCO2 BLDA: 43.8 MM HG (ref 35–45)
PERFORMED ON: ABNORMAL
PERFORMED ON: ABNORMAL
PH BLDA: 7.29 [PH] (ref 7.35–7.45)
PH BLDA: 7.3 [PH] (ref 7.35–7.45)
PH BLDA: 7.37 [PH] (ref 7.35–7.45)
PH UR STRIP.AUTO: 6 [PH] (ref 5–8)
PLATELET # BLD AUTO: 101 K/UL (ref 135–450)
PLATELET # BLD AUTO: 111 K/UL (ref 135–450)
PLATELET # BLD AUTO: 120 K/UL (ref 135–450)
PLATELET # BLD AUTO: 128 K/UL (ref 135–450)
PLATELET # BLD AUTO: 128 K/UL (ref 135–450)
PLATELET # BLD AUTO: 133 K/UL (ref 135–450)
PLATELET # BLD AUTO: 140 K/UL (ref 135–450)
PLATELET # BLD AUTO: 157 K/UL (ref 135–450)
PLATELET # BLD AUTO: 184 K/UL (ref 135–450)
PLATELET # BLD AUTO: 249 K/UL (ref 135–450)
PLATELET # BLD AUTO: 253 K/UL (ref 135–450)
PLATELET # BLD AUTO: 255 K/UL (ref 135–450)
PLATELET # BLD AUTO: 257 K/UL (ref 135–450)
PLATELET # BLD AUTO: 267 K/UL (ref 135–450)
PLATELET # BLD AUTO: 276 K/UL (ref 135–450)
PLATELET # BLD AUTO: 290 K/UL (ref 135–450)
PLATELET # BLD AUTO: 300 K/UL (ref 135–450)
PLATELET # BLD AUTO: 332 K/UL (ref 135–450)
PLATELET # BLD AUTO: 333 K/UL (ref 135–450)
PLATELET # BLD AUTO: 365 K/UL (ref 135–450)
PLATELET BLD QL SMEAR: ADEQUATE
PMV BLD AUTO: 8 FL (ref 5–10.5)
PMV BLD AUTO: 8.3 FL (ref 5–10.5)
PMV BLD AUTO: 8.3 FL (ref 5–10.5)
PMV BLD AUTO: 8.5 FL (ref 5–10.5)
PMV BLD AUTO: 8.5 FL (ref 5–10.5)
PMV BLD AUTO: 8.7 FL (ref 5–10.5)
PMV BLD AUTO: 8.9 FL (ref 5–10.5)
PMV BLD AUTO: 9 FL (ref 5–10.5)
PMV BLD AUTO: 9.2 FL (ref 5–10.5)
PMV BLD AUTO: 9.3 FL (ref 5–10.5)
PMV BLD AUTO: 9.6 FL (ref 5–10.5)
PMV BLD AUTO: 9.7 FL (ref 5–10.5)
PMV BLD AUTO: 9.7 FL (ref 5–10.5)
PO2 BLDA: 107 MMHG (ref 75–108)
PO2 BLDA: 178.9 MM HG (ref 75–108)
PO2 BLDA: 65.8 MM HG (ref 75–108)
POC SAMPLE TYPE: ABNORMAL
POC SAMPLE TYPE: ABNORMAL
POTASSIUM BLD-SCNC: 3.2 MMOL/L (ref 3.5–5.1)
POTASSIUM BLD-SCNC: 4.3 MMOL/L (ref 3.5–5.1)
POTASSIUM SERPL-SCNC: 3.2 MMOL/L (ref 3.5–5.1)
POTASSIUM SERPL-SCNC: 3.2 MMOL/L (ref 3.5–5.1)
POTASSIUM SERPL-SCNC: 3.3 MMOL/L (ref 3.5–5.1)
POTASSIUM SERPL-SCNC: 3.4 MMOL/L (ref 3.5–5.1)
POTASSIUM SERPL-SCNC: 3.5 MMOL/L (ref 3.5–5.1)
POTASSIUM SERPL-SCNC: 3.6 MMOL/L (ref 3.5–5.1)
POTASSIUM SERPL-SCNC: 3.8 MMOL/L (ref 3.5–5.1)
POTASSIUM SERPL-SCNC: 3.8 MMOL/L (ref 3.5–5.1)
POTASSIUM SERPL-SCNC: 3.9 MMOL/L (ref 3.5–5.1)
POTASSIUM SERPL-SCNC: 4 MMOL/L (ref 3.5–5.1)
POTASSIUM SERPL-SCNC: 4.1 MMOL/L (ref 3.5–5.1)
POTASSIUM SERPL-SCNC: 4.2 MMOL/L (ref 3.5–5.1)
POTASSIUM SERPL-SCNC: 4.7 MMOL/L (ref 3.5–5.1)
POTASSIUM SERPL-SCNC: 4.8 MMOL/L (ref 3.5–5.1)
PROCALCITONIN SERPL IA-MCNC: 0.21 NG/ML (ref 0–0.15)
PROCALCITONIN SERPL IA-MCNC: 0.57 NG/ML (ref 0–0.15)
PROT SERPL-MCNC: 4.7 G/DL (ref 6.4–8.2)
PROT SERPL-MCNC: 5 G/DL (ref 6.4–8.2)
PROT SERPL-MCNC: 5.2 G/DL (ref 6.4–8.2)
PROT SERPL-MCNC: 5.2 G/DL (ref 6.4–8.2)
PROT SERPL-MCNC: 5.6 G/DL (ref 6.4–8.2)
PROT UR STRIP.AUTO-MCNC: 30 MG/DL
PROTHROMBIN TIME: 13.9 SEC (ref 11.9–14.9)
RBC # BLD AUTO: 2.69 M/UL (ref 4.2–5.9)
RBC # BLD AUTO: 2.97 M/UL (ref 4.2–5.9)
RBC # BLD AUTO: 3.03 M/UL (ref 4.2–5.9)
RBC # BLD AUTO: 3.08 M/UL (ref 4.2–5.9)
RBC # BLD AUTO: 3.19 M/UL (ref 4.2–5.9)
RBC # BLD AUTO: 3.25 M/UL (ref 4.2–5.9)
RBC # BLD AUTO: 3.26 M/UL (ref 4.2–5.9)
RBC # BLD AUTO: 3.56 M/UL (ref 4.2–5.9)
RBC # BLD AUTO: 3.65 M/UL (ref 4.2–5.9)
RBC # BLD AUTO: 3.71 M/UL (ref 4.2–5.9)
RBC # BLD AUTO: 3.87 M/UL (ref 4.2–5.9)
RBC # BLD AUTO: 3.89 M/UL (ref 4.2–5.9)
RBC # BLD AUTO: 3.9 M/UL (ref 4.2–5.9)
RBC # BLD AUTO: 3.96 M/UL (ref 4.2–5.9)
RBC # BLD AUTO: 4.07 M/UL (ref 4.2–5.9)
RBC # BLD AUTO: 4.12 M/UL (ref 4.2–5.9)
RBC # BLD AUTO: 4.24 M/UL (ref 4.2–5.9)
RBC # BLD AUTO: 4.64 M/UL (ref 4.2–5.9)
RBC # BLD AUTO: 4.65 M/UL (ref 4.2–5.9)
RBC # BLD AUTO: 4.65 M/UL (ref 4.2–5.9)
RBC CLUMPS #/AREA URNS AUTO: 28 /HPF (ref 0–4)
REPORT: NORMAL
SAO2 % BLDA: 100 % (ref 93–100)
SAO2 % BLDA: 90 % (ref 93–100)
SAO2 % BLDA: 98.5 %
SLIDE REVIEW: ABNORMAL
SODIUM BLD-SCNC: 141 MMOL/L (ref 136–145)
SODIUM BLD-SCNC: 141 MMOL/L (ref 136–145)
SODIUM SERPL-SCNC: 133 MMOL/L (ref 136–145)
SODIUM SERPL-SCNC: 134 MMOL/L (ref 136–145)
SODIUM SERPL-SCNC: 136 MMOL/L (ref 136–145)
SODIUM SERPL-SCNC: 139 MMOL/L (ref 136–145)
SODIUM SERPL-SCNC: 140 MMOL/L (ref 136–145)
SODIUM SERPL-SCNC: 141 MMOL/L (ref 136–145)
SODIUM SERPL-SCNC: 143 MMOL/L (ref 136–145)
SP GR UR STRIP.AUTO: >=1.03 (ref 1–1.03)
T4 FREE SERPL-MCNC: 1.4 NG/DL (ref 0.9–1.8)
TROPONIN, HIGH SENSITIVITY: 20 NG/L (ref 0–22)
TROPONIN, HIGH SENSITIVITY: 29 NG/L (ref 0–22)
TSH SERPL DL<=0.005 MIU/L-ACNC: 4.21 UIU/ML (ref 0.27–4.2)
UA COMPLETE W REFLEX CULTURE PNL UR: ABNORMAL
UA DIPSTICK W REFLEX MICRO PNL UR: YES
URN SPEC COLLECT METH UR: ABNORMAL
UROBILINOGEN UR STRIP-ACNC: 2 E.U./DL
WBC # BLD AUTO: 10.6 K/UL (ref 4–11)
WBC # BLD AUTO: 10.9 K/UL (ref 4–11)
WBC # BLD AUTO: 11.4 K/UL (ref 4–11)
WBC # BLD AUTO: 11.5 K/UL (ref 4–11)
WBC # BLD AUTO: 11.9 K/UL (ref 4–11)
WBC # BLD AUTO: 12.6 K/UL (ref 4–11)
WBC # BLD AUTO: 12.7 K/UL (ref 4–11)
WBC # BLD AUTO: 13.7 K/UL (ref 4–11)
WBC # BLD AUTO: 13.8 K/UL (ref 4–11)
WBC # BLD AUTO: 14 K/UL (ref 4–11)
WBC # BLD AUTO: 14 K/UL (ref 4–11)
WBC # BLD AUTO: 14.5 K/UL (ref 4–11)
WBC # BLD AUTO: 14.8 K/UL (ref 4–11)
WBC # BLD AUTO: 14.9 K/UL (ref 4–11)
WBC # BLD AUTO: 15.2 K/UL (ref 4–11)
WBC # BLD AUTO: 15.4 K/UL (ref 4–11)
WBC # BLD AUTO: 16.3 K/UL (ref 4–11)
WBC # BLD AUTO: 7.8 K/UL (ref 4–11)
WBC # BLD AUTO: 8.1 K/UL (ref 4–11)
WBC # BLD AUTO: 8.4 K/UL (ref 4–11)
WBC #/AREA URNS AUTO: 1 /HPF (ref 0–5)

## 2024-01-01 PROCEDURE — 83735 ASSAY OF MAGNESIUM: CPT

## 2024-01-01 PROCEDURE — 2580000003 HC RX 258: Performed by: NURSE PRACTITIONER

## 2024-01-01 PROCEDURE — 6370000000 HC RX 637 (ALT 250 FOR IP): Performed by: NURSE PRACTITIONER

## 2024-01-01 PROCEDURE — 2580000003 HC RX 258: Performed by: STUDENT IN AN ORGANIZED HEALTH CARE EDUCATION/TRAINING PROGRAM

## 2024-01-01 PROCEDURE — 74018 RADEX ABDOMEN 1 VIEW: CPT

## 2024-01-01 PROCEDURE — 51702 INSERT TEMP BLADDER CATH: CPT

## 2024-01-01 PROCEDURE — 6360000002 HC RX W HCPCS: Performed by: NURSE PRACTITIONER

## 2024-01-01 PROCEDURE — C1894 INTRO/SHEATH, NON-LASER: HCPCS | Performed by: SURGERY

## 2024-01-01 PROCEDURE — 85025 COMPLETE CBC W/AUTO DIFF WBC: CPT

## 2024-01-01 PROCEDURE — 6360000002 HC RX W HCPCS: Performed by: STUDENT IN AN ORGANIZED HEALTH CARE EDUCATION/TRAINING PROGRAM

## 2024-01-01 PROCEDURE — 6370000000 HC RX 637 (ALT 250 FOR IP): Performed by: INTERNAL MEDICINE

## 2024-01-01 PROCEDURE — 2580000003 HC RX 258: Performed by: INTERNAL MEDICINE

## 2024-01-01 PROCEDURE — 97530 THERAPEUTIC ACTIVITIES: CPT

## 2024-01-01 PROCEDURE — 83605 ASSAY OF LACTIC ACID: CPT

## 2024-01-01 PROCEDURE — 02H63KZ INSERTION OF DEFIBRILLATOR LEAD INTO RIGHT ATRIUM, PERCUTANEOUS APPROACH: ICD-10-PCS | Performed by: INTERNAL MEDICINE

## 2024-01-01 PROCEDURE — 3600000009 HC SURGERY ROBOT BASE: Performed by: SURGERY

## 2024-01-01 PROCEDURE — 2000000000 HC ICU R&B

## 2024-01-01 PROCEDURE — 6370000000 HC RX 637 (ALT 250 FOR IP)

## 2024-01-01 PROCEDURE — 6370000000 HC RX 637 (ALT 250 FOR IP): Performed by: UROLOGY

## 2024-01-01 PROCEDURE — C1721 AICD, DUAL CHAMBER: HCPCS | Performed by: INTERNAL MEDICINE

## 2024-01-01 PROCEDURE — 88304 TISSUE EXAM BY PATHOLOGIST: CPT

## 2024-01-01 PROCEDURE — 97535 SELF CARE MNGMENT TRAINING: CPT

## 2024-01-01 PROCEDURE — 93010 ELECTROCARDIOGRAM REPORT: CPT | Performed by: INTERNAL MEDICINE

## 2024-01-01 PROCEDURE — 80076 HEPATIC FUNCTION PANEL: CPT

## 2024-01-01 PROCEDURE — 2700000000 HC OXYGEN THERAPY PER DAY

## 2024-01-01 PROCEDURE — 6360000002 HC RX W HCPCS: Performed by: INTERNAL MEDICINE

## 2024-01-01 PROCEDURE — C8929 TTE W OR WO FOL WCON,DOPPLER: HCPCS

## 2024-01-01 PROCEDURE — 6360000002 HC RX W HCPCS

## 2024-01-01 PROCEDURE — 97162 PT EVAL MOD COMPLEX 30 MIN: CPT

## 2024-01-01 PROCEDURE — 84439 ASSAY OF FREE THYROXINE: CPT

## 2024-01-01 PROCEDURE — 71045 X-RAY EXAM CHEST 1 VIEW: CPT

## 2024-01-01 PROCEDURE — 99232 SBSQ HOSP IP/OBS MODERATE 35: CPT | Performed by: CLINICAL NURSE SPECIALIST

## 2024-01-01 PROCEDURE — 94002 VENT MGMT INPAT INIT DAY: CPT

## 2024-01-01 PROCEDURE — 2720000010 HC SURG SUPPLY STERILE: Performed by: SURGERY

## 2024-01-01 PROCEDURE — 99232 SBSQ HOSP IP/OBS MODERATE 35: CPT | Performed by: SURGERY

## 2024-01-01 PROCEDURE — 99233 SBSQ HOSP IP/OBS HIGH 50: CPT | Performed by: INTERNAL MEDICINE

## 2024-01-01 PROCEDURE — 31500 INSERT EMERGENCY AIRWAY: CPT

## 2024-01-01 PROCEDURE — 99285 EMERGENCY DEPT VISIT HI MDM: CPT

## 2024-01-01 PROCEDURE — 6370000000 HC RX 637 (ALT 250 FOR IP): Performed by: STUDENT IN AN ORGANIZED HEALTH CARE EDUCATION/TRAINING PROGRAM

## 2024-01-01 PROCEDURE — 94150 VITAL CAPACITY TEST: CPT

## 2024-01-01 PROCEDURE — 70450 CT HEAD/BRAIN W/O DYE: CPT

## 2024-01-01 PROCEDURE — 36415 COLL VENOUS BLD VENIPUNCTURE: CPT

## 2024-01-01 PROCEDURE — 6360000004 HC RX CONTRAST MEDICATION: Performed by: INTERNAL MEDICINE

## 2024-01-01 PROCEDURE — 36556 INSERT NON-TUNNEL CV CATH: CPT

## 2024-01-01 PROCEDURE — C1894 INTRO/SHEATH, NON-LASER: HCPCS | Performed by: INTERNAL MEDICINE

## 2024-01-01 PROCEDURE — APPSS60 APP SPLIT SHARED TIME 46-60 MINUTES: Performed by: NURSE PRACTITIONER

## 2024-01-01 PROCEDURE — 02HK3KZ INSERTION OF DEFIBRILLATOR LEAD INTO RIGHT VENTRICLE, PERCUTANEOUS APPROACH: ICD-10-PCS | Performed by: INTERNAL MEDICINE

## 2024-01-01 PROCEDURE — APPNB30 APP NON BILLABLE TIME 0-30 MINS: Performed by: NURSE PRACTITIONER

## 2024-01-01 PROCEDURE — 84295 ASSAY OF SERUM SODIUM: CPT

## 2024-01-01 PROCEDURE — 2580000003 HC RX 258

## 2024-01-01 PROCEDURE — 3700000000 HC ANESTHESIA ATTENDED CARE: Performed by: SURGERY

## 2024-01-01 PROCEDURE — 47563 LAPARO CHOLECYSTECTOMY/GRAPH: CPT | Performed by: SURGERY

## 2024-01-01 PROCEDURE — 80151 DRUG ASSAY AMIODARONE: CPT

## 2024-01-01 PROCEDURE — 80048 BASIC METABOLIC PNL TOTAL CA: CPT

## 2024-01-01 PROCEDURE — 51700 IRRIGATION OF BLADDER: CPT

## 2024-01-01 PROCEDURE — 0BH17EZ INSERTION OF ENDOTRACHEAL AIRWAY INTO TRACHEA, VIA NATURAL OR ARTIFICIAL OPENING: ICD-10-PCS | Performed by: INTERNAL MEDICINE

## 2024-01-01 PROCEDURE — 7100000001 HC PACU RECOVERY - ADDTL 15 MIN: Performed by: SURGERY

## 2024-01-01 PROCEDURE — 97116 GAIT TRAINING THERAPY: CPT

## 2024-01-01 PROCEDURE — 6360000004 HC RX CONTRAST MEDICATION: Performed by: SURGERY

## 2024-01-01 PROCEDURE — 87150 DNA/RNA AMPLIFIED PROBE: CPT

## 2024-01-01 PROCEDURE — 82947 ASSAY GLUCOSE BLOOD QUANT: CPT

## 2024-01-01 PROCEDURE — 7100000000 HC PACU RECOVERY - FIRST 15 MIN: Performed by: SURGERY

## 2024-01-01 PROCEDURE — 33249 INSJ/RPLCMT DEFIB W/LEAD(S): CPT | Performed by: INTERNAL MEDICINE

## 2024-01-01 PROCEDURE — 85027 COMPLETE CBC AUTOMATED: CPT

## 2024-01-01 PROCEDURE — 99231 SBSQ HOSP IP/OBS SF/LOW 25: CPT | Performed by: SURGERY

## 2024-01-01 PROCEDURE — APPSS15 APP SPLIT SHARED TIME 0-15 MINUTES: Performed by: NURSE PRACTITIONER

## 2024-01-01 PROCEDURE — 2500000003 HC RX 250 WO HCPCS: Performed by: INTERNAL MEDICINE

## 2024-01-01 PROCEDURE — 80053 COMPREHEN METABOLIC PANEL: CPT

## 2024-01-01 PROCEDURE — 82803 BLOOD GASES ANY COMBINATION: CPT

## 2024-01-01 PROCEDURE — 83880 ASSAY OF NATRIURETIC PEPTIDE: CPT

## 2024-01-01 PROCEDURE — 92526 ORAL FUNCTION THERAPY: CPT

## 2024-01-01 PROCEDURE — 74178 CT ABD&PLV WO CNTR FLWD CNTR: CPT

## 2024-01-01 PROCEDURE — 3C1ZX8Z IRRIGATION OF INDWELLING DEVICE USING IRRIGATING SUBSTANCE, EXTERNAL APPROACH: ICD-10-PCS | Performed by: DENTIST

## 2024-01-01 PROCEDURE — 0JH608Z INSERTION OF DEFIBRILLATOR GENERATOR INTO CHEST SUBCUTANEOUS TISSUE AND FASCIA, OPEN APPROACH: ICD-10-PCS | Performed by: INTERNAL MEDICINE

## 2024-01-01 PROCEDURE — 4A023N7 MEASUREMENT OF CARDIAC SAMPLING AND PRESSURE, LEFT HEART, PERCUTANEOUS APPROACH: ICD-10-PCS | Performed by: INTERNAL MEDICINE

## 2024-01-01 PROCEDURE — C1898 LEAD, PMKR, OTHER THAN TRANS: HCPCS | Performed by: INTERNAL MEDICINE

## 2024-01-01 PROCEDURE — 2500000003 HC RX 250 WO HCPCS: Performed by: STUDENT IN AN ORGANIZED HEALTH CARE EDUCATION/TRAINING PROGRAM

## 2024-01-01 PROCEDURE — 94761 N-INVAS EAR/PLS OXIMETRY MLT: CPT

## 2024-01-01 PROCEDURE — 5A1935Z RESPIRATORY VENTILATION, LESS THAN 24 CONSECUTIVE HOURS: ICD-10-PCS | Performed by: INTERNAL MEDICINE

## 2024-01-01 PROCEDURE — 99232 SBSQ HOSP IP/OBS MODERATE 35: CPT

## 2024-01-01 PROCEDURE — 97166 OT EVAL MOD COMPLEX 45 MIN: CPT

## 2024-01-01 PROCEDURE — 1200000000 HC SEMI PRIVATE

## 2024-01-01 PROCEDURE — 87040 BLOOD CULTURE FOR BACTERIA: CPT

## 2024-01-01 PROCEDURE — 2709999900 HC NON-CHARGEABLE SUPPLY: Performed by: INTERNAL MEDICINE

## 2024-01-01 PROCEDURE — B2111ZZ FLUOROSCOPY OF MULTIPLE CORONARY ARTERIES USING LOW OSMOLAR CONTRAST: ICD-10-PCS | Performed by: INTERNAL MEDICINE

## 2024-01-01 PROCEDURE — 2500000003 HC RX 250 WO HCPCS: Performed by: SURGERY

## 2024-01-01 PROCEDURE — 85014 HEMATOCRIT: CPT

## 2024-01-01 PROCEDURE — 99232 SBSQ HOSP IP/OBS MODERATE 35: CPT | Performed by: INTERNAL MEDICINE

## 2024-01-01 PROCEDURE — 99291 CRITICAL CARE FIRST HOUR: CPT | Performed by: INTERNAL MEDICINE

## 2024-01-01 PROCEDURE — 0FT44ZZ RESECTION OF GALLBLADDER, PERCUTANEOUS ENDOSCOPIC APPROACH: ICD-10-PCS | Performed by: SURGERY

## 2024-01-01 PROCEDURE — 87641 MR-STAPH DNA AMP PROBE: CPT

## 2024-01-01 PROCEDURE — 74300 X-RAY BILE DUCTS/PANCREAS: CPT

## 2024-01-01 PROCEDURE — 99024 POSTOP FOLLOW-UP VISIT: CPT | Performed by: SURGERY

## 2024-01-01 PROCEDURE — 99153 MOD SED SAME PHYS/QHP EA: CPT | Performed by: INTERNAL MEDICINE

## 2024-01-01 PROCEDURE — 82330 ASSAY OF CALCIUM: CPT

## 2024-01-01 PROCEDURE — 96361 HYDRATE IV INFUSION ADD-ON: CPT

## 2024-01-01 PROCEDURE — 02HV33Z INSERTION OF INFUSION DEVICE INTO SUPERIOR VENA CAVA, PERCUTANEOUS APPROACH: ICD-10-PCS | Performed by: INTERNAL MEDICINE

## 2024-01-01 PROCEDURE — 93458 L HRT ARTERY/VENTRICLE ANGIO: CPT | Performed by: INTERNAL MEDICINE

## 2024-01-01 PROCEDURE — 84484 ASSAY OF TROPONIN QUANT: CPT

## 2024-01-01 PROCEDURE — C1889 IMPLANT/INSERT DEVICE, NOC: HCPCS | Performed by: INTERNAL MEDICINE

## 2024-01-01 PROCEDURE — C1777 LEAD, AICD, ENDO SINGLE COIL: HCPCS | Performed by: INTERNAL MEDICINE

## 2024-01-01 PROCEDURE — 2100000000 HC CCU R&B

## 2024-01-01 PROCEDURE — 99152 MOD SED SAME PHYS/QHP 5/>YRS: CPT | Performed by: INTERNAL MEDICINE

## 2024-01-01 PROCEDURE — 99223 1ST HOSP IP/OBS HIGH 75: CPT | Performed by: INTERNAL MEDICINE

## 2024-01-01 PROCEDURE — 84443 ASSAY THYROID STIM HORMONE: CPT

## 2024-01-01 PROCEDURE — C1889 IMPLANT/INSERT DEVICE, NOC: HCPCS | Performed by: SURGERY

## 2024-01-01 PROCEDURE — 3700000001 HC ADD 15 MINUTES (ANESTHESIA): Performed by: SURGERY

## 2024-01-01 PROCEDURE — 84145 PROCALCITONIN (PCT): CPT

## 2024-01-01 PROCEDURE — 92523 SPEECH SOUND LANG COMPREHEN: CPT

## 2024-01-01 PROCEDURE — 82435 ASSAY OF BLOOD CHLORIDE: CPT

## 2024-01-01 PROCEDURE — 84132 ASSAY OF SERUM POTASSIUM: CPT

## 2024-01-01 PROCEDURE — 85730 THROMBOPLASTIN TIME PARTIAL: CPT

## 2024-01-01 PROCEDURE — 83690 ASSAY OF LIPASE: CPT

## 2024-01-01 PROCEDURE — 6360000004 HC RX CONTRAST MEDICATION: Performed by: STUDENT IN AN ORGANIZED HEALTH CARE EDUCATION/TRAINING PROGRAM

## 2024-01-01 PROCEDURE — 2580000003 HC RX 258: Performed by: SURGERY

## 2024-01-01 PROCEDURE — 3600000019 HC SURGERY ROBOT ADDTL 15MIN: Performed by: SURGERY

## 2024-01-01 PROCEDURE — 85610 PROTHROMBIN TIME: CPT

## 2024-01-01 PROCEDURE — C1769 GUIDE WIRE: HCPCS | Performed by: INTERNAL MEDICINE

## 2024-01-01 PROCEDURE — 94003 VENT MGMT INPAT SUBQ DAY: CPT

## 2024-01-01 PROCEDURE — 92507 TX SP LANG VOICE COMM INDIV: CPT

## 2024-01-01 PROCEDURE — 6360000004 HC RX CONTRAST MEDICATION

## 2024-01-01 PROCEDURE — 92610 EVALUATE SWALLOWING FUNCTION: CPT

## 2024-01-01 PROCEDURE — 93005 ELECTROCARDIOGRAM TRACING: CPT | Performed by: INTERNAL MEDICINE

## 2024-01-01 PROCEDURE — 85520 HEPARIN ASSAY: CPT

## 2024-01-01 PROCEDURE — 81001 URINALYSIS AUTO W/SCOPE: CPT

## 2024-01-01 PROCEDURE — 6370000000 HC RX 637 (ALT 250 FOR IP): Performed by: CLINICAL NURSE SPECIALIST

## 2024-01-01 PROCEDURE — C1892 INTRO/SHEATH,FIXED,PEEL-AWAY: HCPCS | Performed by: INTERNAL MEDICINE

## 2024-01-01 PROCEDURE — 2500000003 HC RX 250 WO HCPCS

## 2024-01-01 PROCEDURE — 96374 THER/PROPH/DIAG INJ IV PUSH: CPT

## 2024-01-01 PROCEDURE — 87186 SC STD MICRODIL/AGAR DIL: CPT

## 2024-01-01 PROCEDURE — 6370000000 HC RX 637 (ALT 250 FOR IP): Performed by: SURGERY

## 2024-01-01 PROCEDURE — P9045 ALBUMIN (HUMAN), 5%, 250 ML: HCPCS

## 2024-01-01 PROCEDURE — 2720000010 HC SURG SUPPLY STERILE: Performed by: INTERNAL MEDICINE

## 2024-01-01 PROCEDURE — 99233 SBSQ HOSP IP/OBS HIGH 50: CPT | Performed by: NURSE PRACTITIONER

## 2024-01-01 PROCEDURE — S2900 ROBOTIC SURGICAL SYSTEM: HCPCS | Performed by: SURGERY

## 2024-01-01 PROCEDURE — 71260 CT THORAX DX C+: CPT

## 2024-01-01 PROCEDURE — 99231 SBSQ HOSP IP/OBS SF/LOW 25: CPT

## 2024-01-01 PROCEDURE — 76705 ECHO EXAM OF ABDOMEN: CPT

## 2024-01-01 PROCEDURE — 99223 1ST HOSP IP/OBS HIGH 75: CPT | Performed by: SURGERY

## 2024-01-01 PROCEDURE — 82550 ASSAY OF CK (CPK): CPT

## 2024-01-01 PROCEDURE — 2709999900 HC NON-CHARGEABLE SUPPLY: Performed by: SURGERY

## 2024-01-01 PROCEDURE — 8E0W4CZ ROBOTIC ASSISTED PROCEDURE OF TRUNK REGION, PERCUTANEOUS ENDOSCOPIC APPROACH: ICD-10-PCS | Performed by: SURGERY

## 2024-01-01 PROCEDURE — BF131ZZ FLUOROSCOPY OF GALLBLADDER AND BILE DUCTS USING LOW OSMOLAR CONTRAST: ICD-10-PCS | Performed by: SURGERY

## 2024-01-01 PROCEDURE — 93306 TTE W/DOPPLER COMPLETE: CPT | Performed by: INTERNAL MEDICINE

## 2024-01-01 PROCEDURE — 74176 CT ABD & PELVIS W/O CONTRAST: CPT

## 2024-01-01 DEVICE — ENVELOPE CMRM6133 ABSORB LRG MR
Type: IMPLANTABLE DEVICE | Status: FUNCTIONAL
Brand: TYRX™

## 2024-01-01 DEVICE — ICD DDPA2D4 COBALT XT DR MRI DF4 USA
Type: IMPLANTABLE DEVICE | Status: FUNCTIONAL
Brand: COBALT™ XT DR MRI SURESCAN™

## 2024-01-01 DEVICE — LEAD 5076-52 MRI US RCMCRD
Type: IMPLANTABLE DEVICE | Status: FUNCTIONAL
Brand: CAPSUREFIX NOVUS MRI™ SURESCAN®

## 2024-01-01 DEVICE — LEAD PACE AD 8.6FR L62CM ATR VENT SIL POLYUR DF4 CONN INSUL: Type: IMPLANTABLE DEVICE | Status: FUNCTIONAL

## 2024-01-01 DEVICE — CLIP INT M L POLYMER LOK LIG HEM O LOK: Type: IMPLANTABLE DEVICE | Status: FUNCTIONAL

## 2024-01-01 RX ORDER — SODIUM CHLORIDE 0.9 % (FLUSH) 0.9 %
5-40 SYRINGE (ML) INJECTION EVERY 12 HOURS SCHEDULED
Status: DISCONTINUED | OUTPATIENT
Start: 2024-01-01 | End: 2024-01-01 | Stop reason: HOSPADM

## 2024-01-01 RX ORDER — SODIUM CHLORIDE 9 MG/ML
INJECTION, SOLUTION INTRAVENOUS PRN
Status: DISCONTINUED | OUTPATIENT
Start: 2024-01-01 | End: 2024-01-01 | Stop reason: HOSPADM

## 2024-01-01 RX ORDER — AMIODARONE HYDROCHLORIDE 200 MG/1
400 TABLET ORAL DAILY
Status: DISCONTINUED | OUTPATIENT
Start: 2024-01-01 | End: 2024-01-01 | Stop reason: HOSPADM

## 2024-01-01 RX ORDER — ACETAMINOPHEN 325 MG/1
650 TABLET ORAL EVERY 4 HOURS PRN
Status: DISCONTINUED | OUTPATIENT
Start: 2024-01-01 | End: 2024-01-01 | Stop reason: HOSPADM

## 2024-01-01 RX ORDER — HEPARIN SODIUM 10000 [USP'U]/100ML
5-30 INJECTION, SOLUTION INTRAVENOUS CONTINUOUS
Status: DISCONTINUED | OUTPATIENT
Start: 2024-01-01 | End: 2024-01-01

## 2024-01-01 RX ORDER — OXYCODONE HYDROCHLORIDE 5 MG/1
5 TABLET ORAL PRN
Status: DISCONTINUED | OUTPATIENT
Start: 2024-01-01 | End: 2024-01-01 | Stop reason: HOSPADM

## 2024-01-01 RX ORDER — OXYBUTYNIN CHLORIDE 5 MG/1
5 TABLET ORAL 3 TIMES DAILY
Status: DISCONTINUED | OUTPATIENT
Start: 2024-01-01 | End: 2024-01-01 | Stop reason: HOSPADM

## 2024-01-01 RX ORDER — MAGNESIUM SULFATE IN WATER 40 MG/ML
2000 INJECTION, SOLUTION INTRAVENOUS ONCE
Status: COMPLETED | OUTPATIENT
Start: 2024-01-01 | End: 2024-01-01

## 2024-01-01 RX ORDER — HEPARIN SODIUM 1000 [USP'U]/ML
40 INJECTION, SOLUTION INTRAVENOUS; SUBCUTANEOUS PRN
Status: DISCONTINUED | OUTPATIENT
Start: 2024-01-01 | End: 2024-01-01

## 2024-01-01 RX ORDER — ACETAMINOPHEN 325 MG/1
650 TABLET ORAL EVERY 6 HOURS PRN
Status: DISCONTINUED | OUTPATIENT
Start: 2024-01-01 | End: 2024-01-01

## 2024-01-01 RX ORDER — NALOXONE HYDROCHLORIDE 0.4 MG/ML
INJECTION, SOLUTION INTRAMUSCULAR; INTRAVENOUS; SUBCUTANEOUS PRN
Status: DISCONTINUED | OUTPATIENT
Start: 2024-01-01 | End: 2024-01-01 | Stop reason: HOSPADM

## 2024-01-01 RX ORDER — HEPARIN SODIUM 1000 [USP'U]/ML
80 INJECTION, SOLUTION INTRAVENOUS; SUBCUTANEOUS PRN
Status: DISCONTINUED | OUTPATIENT
Start: 2024-01-01 | End: 2024-01-01

## 2024-01-01 RX ORDER — CARVEDILOL 3.12 MG/1
3.12 TABLET ORAL 2 TIMES DAILY WITH MEALS
Status: DISCONTINUED | OUTPATIENT
Start: 2024-01-01 | End: 2024-01-01

## 2024-01-01 RX ORDER — MIDAZOLAM HYDROCHLORIDE 1 MG/ML
INJECTION INTRAMUSCULAR; INTRAVENOUS
Status: DISPENSED
Start: 2024-01-01 | End: 2024-01-01

## 2024-01-01 RX ORDER — MIDAZOLAM HYDROCHLORIDE 1 MG/ML
INJECTION INTRAMUSCULAR; INTRAVENOUS
Status: COMPLETED | OUTPATIENT
Start: 2024-01-01 | End: 2024-01-01

## 2024-01-01 RX ORDER — 0.9 % SODIUM CHLORIDE 0.9 %
500 INTRAVENOUS SOLUTION INTRAVENOUS ONCE
Status: COMPLETED | OUTPATIENT
Start: 2024-01-01 | End: 2024-01-01

## 2024-01-01 RX ORDER — LANOLIN ALCOHOL/MO/W.PET/CERES
6 CREAM (GRAM) TOPICAL ONCE
Status: DISCONTINUED | OUTPATIENT
Start: 2024-01-01 | End: 2024-01-01 | Stop reason: HOSPADM

## 2024-01-01 RX ORDER — METOPROLOL TARTRATE 1 MG/ML
INJECTION, SOLUTION INTRAVENOUS
Status: COMPLETED | OUTPATIENT
Start: 2024-01-01 | End: 2024-01-01

## 2024-01-01 RX ORDER — AMIODARONE HYDROCHLORIDE 200 MG/1
400 TABLET ORAL DAILY
Status: DISCONTINUED | OUTPATIENT
Start: 2024-01-01 | End: 2024-01-01

## 2024-01-01 RX ORDER — ACETAMINOPHEN 650 MG/1
650 SUPPOSITORY RECTAL EVERY 6 HOURS PRN
Status: DISCONTINUED | OUTPATIENT
Start: 2024-01-01 | End: 2024-01-01 | Stop reason: HOSPADM

## 2024-01-01 RX ORDER — LORAZEPAM 2 MG/ML
INJECTION INTRAMUSCULAR
Status: DISPENSED
Start: 2024-01-01 | End: 2024-01-01

## 2024-01-01 RX ORDER — ASPIRIN 81 MG/1
81 TABLET ORAL DAILY
Status: DISCONTINUED | OUTPATIENT
Start: 2024-01-01 | End: 2024-01-01 | Stop reason: HOSPADM

## 2024-01-01 RX ORDER — TAMSULOSIN HYDROCHLORIDE 0.4 MG/1
0.4 CAPSULE ORAL NIGHTLY
Status: DISCONTINUED | OUTPATIENT
Start: 2024-01-01 | End: 2024-01-01 | Stop reason: HOSPADM

## 2024-01-01 RX ORDER — IOPAMIDOL 755 MG/ML
75 INJECTION, SOLUTION INTRAVASCULAR
Status: COMPLETED | OUTPATIENT
Start: 2024-01-01 | End: 2024-01-01

## 2024-01-01 RX ORDER — ACETAMINOPHEN 325 MG/1
TABLET ORAL
Status: DISPENSED
Start: 2024-01-01 | End: 2024-01-01

## 2024-01-01 RX ORDER — POLYETHYLENE GLYCOL 3350 17 G/17G
17 POWDER, FOR SOLUTION ORAL DAILY PRN
Status: DISCONTINUED | OUTPATIENT
Start: 2024-01-01 | End: 2024-01-01 | Stop reason: HOSPADM

## 2024-01-01 RX ORDER — ENOXAPARIN SODIUM 100 MG/ML
40 INJECTION SUBCUTANEOUS DAILY
Status: DISCONTINUED | OUTPATIENT
Start: 2024-01-01 | End: 2024-01-01

## 2024-01-01 RX ORDER — MIDAZOLAM HYDROCHLORIDE 1 MG/ML
INJECTION INTRAMUSCULAR; INTRAVENOUS PRN
Status: DISCONTINUED | OUTPATIENT
Start: 2024-01-01 | End: 2024-01-01 | Stop reason: HOSPADM

## 2024-01-01 RX ORDER — LIDOCAINE HYDROCHLORIDE ANHYDROUS AND DEXTROSE MONOHYDRATE 5; 400 G/100ML; MG/100ML
1 INJECTION, SOLUTION INTRAVENOUS CONTINUOUS
Status: DISCONTINUED | OUTPATIENT
Start: 2024-01-01 | End: 2024-01-01

## 2024-01-01 RX ORDER — MAGNESIUM SULFATE IN WATER 40 MG/ML
2000 INJECTION, SOLUTION INTRAVENOUS PRN
Status: DISCONTINUED | OUTPATIENT
Start: 2024-01-01 | End: 2024-01-01 | Stop reason: HOSPADM

## 2024-01-01 RX ORDER — SODIUM CHLORIDE 0.9 % (FLUSH) 0.9 %
5-40 SYRINGE (ML) INJECTION PRN
Status: DISCONTINUED | OUTPATIENT
Start: 2024-01-01 | End: 2024-01-01

## 2024-01-01 RX ORDER — ROCURONIUM BROMIDE 10 MG/ML
75 INJECTION, SOLUTION INTRAVENOUS ONCE
Status: COMPLETED | OUTPATIENT
Start: 2024-01-01 | End: 2024-01-01

## 2024-01-01 RX ORDER — OXYCODONE HYDROCHLORIDE 5 MG/1
10 TABLET ORAL PRN
Status: DISCONTINUED | OUTPATIENT
Start: 2024-01-01 | End: 2024-01-01 | Stop reason: HOSPADM

## 2024-01-01 RX ORDER — ACETAMINOPHEN 325 MG/1
TABLET ORAL
Status: COMPLETED
Start: 2024-01-01 | End: 2024-01-01

## 2024-01-01 RX ORDER — MEXILETINE HYDROCHLORIDE 200 MG/1
200 CAPSULE ORAL EVERY 8 HOURS SCHEDULED
Status: DISCONTINUED | OUTPATIENT
Start: 2024-01-01 | End: 2024-01-01

## 2024-01-01 RX ORDER — POTASSIUM CHLORIDE 1500 MG/1
40 TABLET, EXTENDED RELEASE ORAL PRN
Status: DISCONTINUED | OUTPATIENT
Start: 2024-01-01 | End: 2024-01-01 | Stop reason: HOSPADM

## 2024-01-01 RX ORDER — POTASSIUM CHLORIDE 1500 MG/1
40 TABLET, EXTENDED RELEASE ORAL ONCE
Status: COMPLETED | OUTPATIENT
Start: 2024-01-01 | End: 2024-01-01

## 2024-01-01 RX ORDER — FENTANYL CITRATE 50 UG/ML
INJECTION, SOLUTION INTRAMUSCULAR; INTRAVENOUS PRN
Status: DISCONTINUED | OUTPATIENT
Start: 2024-01-01 | End: 2024-01-01 | Stop reason: HOSPADM

## 2024-01-01 RX ORDER — MIDAZOLAM HYDROCHLORIDE 1 MG/ML
INJECTION INTRAMUSCULAR; INTRAVENOUS
Status: COMPLETED
Start: 2024-01-01 | End: 2024-01-01

## 2024-01-01 RX ORDER — PROPOFOL 10 MG/ML
5-50 INJECTION, EMULSION INTRAVENOUS CONTINUOUS
Status: DISCONTINUED | OUTPATIENT
Start: 2024-01-01 | End: 2024-01-01

## 2024-01-01 RX ORDER — SODIUM CHLORIDE 0.9 % (FLUSH) 0.9 %
5-40 SYRINGE (ML) INJECTION PRN
Status: DISCONTINUED | OUTPATIENT
Start: 2024-01-01 | End: 2024-01-01 | Stop reason: HOSPADM

## 2024-01-01 RX ORDER — ALBUMIN, HUMAN INJ 5% 5 %
SOLUTION INTRAVENOUS
Status: COMPLETED
Start: 2024-01-01 | End: 2024-01-01

## 2024-01-01 RX ORDER — OXYBUTYNIN CHLORIDE 5 MG/1
5 TABLET ORAL EVERY 8 HOURS PRN
Status: DISCONTINUED | OUTPATIENT
Start: 2024-01-01 | End: 2024-01-01

## 2024-01-01 RX ORDER — SACUBITRIL AND VALSARTAN 24; 26 MG/1; MG/1
1 TABLET, FILM COATED ORAL 2 TIMES DAILY
Status: ON HOLD | COMMUNITY
End: 2024-01-01 | Stop reason: HOSPADM

## 2024-01-01 RX ORDER — LACTOBACILLUS RHAMNOSUS GG 10B CELL
1 CAPSULE ORAL 2 TIMES DAILY WITH MEALS
Status: DISCONTINUED | OUTPATIENT
Start: 2024-01-01 | End: 2024-01-01 | Stop reason: HOSPADM

## 2024-01-01 RX ORDER — SODIUM CHLORIDE, SODIUM LACTATE, POTASSIUM CHLORIDE, CALCIUM CHLORIDE 600; 310; 30; 20 MG/100ML; MG/100ML; MG/100ML; MG/100ML
INJECTION, SOLUTION INTRAVENOUS CONTINUOUS
Status: DISCONTINUED | OUTPATIENT
Start: 2024-01-01 | End: 2024-01-01

## 2024-01-01 RX ORDER — LIDOCAINE HYDROCHLORIDE 20 MG/ML
80 INJECTION, SOLUTION INTRAVENOUS ONCE
Status: DISCONTINUED | OUTPATIENT
Start: 2024-01-01 | End: 2024-01-01

## 2024-01-01 RX ORDER — BUPIVACAINE HYDROCHLORIDE 5 MG/ML
INJECTION, SOLUTION EPIDURAL; INTRACAUDAL PRN
Status: DISCONTINUED | OUTPATIENT
Start: 2024-01-01 | End: 2024-01-01 | Stop reason: HOSPADM

## 2024-01-01 RX ORDER — 0.9 % SODIUM CHLORIDE 0.9 %
1000 INTRAVENOUS SOLUTION INTRAVENOUS ONCE
Status: COMPLETED | OUTPATIENT
Start: 2024-01-01 | End: 2024-01-01

## 2024-01-01 RX ORDER — AMIODARONE HYDROCHLORIDE 200 MG/1
200 TABLET ORAL DAILY
Status: DISCONTINUED | OUTPATIENT
Start: 2024-01-01 | End: 2024-01-01

## 2024-01-01 RX ORDER — SCOLOPAMINE TRANSDERMAL SYSTEM 1 MG/1
1 PATCH, EXTENDED RELEASE TRANSDERMAL
Status: DISCONTINUED | OUTPATIENT
Start: 2024-01-01 | End: 2024-01-01 | Stop reason: HOSPADM

## 2024-01-01 RX ORDER — BUPIVACAINE HYDROCHLORIDE AND EPINEPHRINE 5; 5 MG/ML; UG/ML
INJECTION, SOLUTION EPIDURAL; INTRACAUDAL; PERINEURAL
Status: COMPLETED | OUTPATIENT
Start: 2024-01-01 | End: 2024-01-01

## 2024-01-01 RX ORDER — SODIUM CHLORIDE, SODIUM LACTATE, POTASSIUM CHLORIDE, CALCIUM CHLORIDE 600; 310; 30; 20 MG/100ML; MG/100ML; MG/100ML; MG/100ML
INJECTION, SOLUTION INTRAVENOUS CONTINUOUS PRN
Status: COMPLETED | OUTPATIENT
Start: 2024-01-01 | End: 2024-01-01

## 2024-01-01 RX ORDER — OXYBUTYNIN CHLORIDE 5 MG/1
5 TABLET ORAL 3 TIMES DAILY
Status: DISCONTINUED | OUTPATIENT
Start: 2024-01-01 | End: 2024-01-01

## 2024-01-01 RX ORDER — FENTANYL CITRATE-0.9 % NACL/PF 10 MCG/ML
25-200 PLASTIC BAG, INJECTION (ML) INTRAVENOUS CONTINUOUS
Status: DISCONTINUED | OUTPATIENT
Start: 2024-01-01 | End: 2024-01-01

## 2024-01-01 RX ORDER — METOPROLOL SUCCINATE 25 MG/1
25 TABLET, EXTENDED RELEASE ORAL DAILY
Status: DISCONTINUED | OUTPATIENT
Start: 2024-01-01 | End: 2024-01-01 | Stop reason: HOSPADM

## 2024-01-01 RX ORDER — CARVEDILOL 6.25 MG/1
6.25 TABLET ORAL 2 TIMES DAILY WITH MEALS
Status: DISCONTINUED | OUTPATIENT
Start: 2024-01-01 | End: 2024-01-01

## 2024-01-01 RX ORDER — ETOMIDATE 2 MG/ML
20 INJECTION INTRAVENOUS ONCE
Status: COMPLETED | OUTPATIENT
Start: 2024-01-01 | End: 2024-01-01

## 2024-01-01 RX ORDER — ONDANSETRON 4 MG/1
4 TABLET, ORALLY DISINTEGRATING ORAL EVERY 8 HOURS PRN
Status: DISCONTINUED | OUTPATIENT
Start: 2024-01-01 | End: 2024-01-01 | Stop reason: HOSPADM

## 2024-01-01 RX ORDER — HALOPERIDOL 5 MG/ML
1 INJECTION INTRAMUSCULAR
Status: DISCONTINUED | OUTPATIENT
Start: 2024-01-01 | End: 2024-01-01 | Stop reason: HOSPADM

## 2024-01-01 RX ORDER — POTASSIUM CHLORIDE 1500 MG/1
20 TABLET, EXTENDED RELEASE ORAL
Status: DISCONTINUED | OUTPATIENT
Start: 2024-01-01 | End: 2024-01-01

## 2024-01-01 RX ORDER — METOPROLOL TARTRATE 1 MG/ML
INJECTION, SOLUTION INTRAVENOUS
Status: COMPLETED
Start: 2024-01-01 | End: 2024-01-01

## 2024-01-01 RX ORDER — SODIUM CHLORIDE 0.9 % (FLUSH) 0.9 %
5-40 SYRINGE (ML) INJECTION EVERY 12 HOURS SCHEDULED
Status: CANCELLED | OUTPATIENT
Start: 2024-01-01

## 2024-01-01 RX ORDER — HEPARIN SODIUM 1000 [USP'U]/ML
80 INJECTION, SOLUTION INTRAVENOUS; SUBCUTANEOUS ONCE
Status: DISCONTINUED | OUTPATIENT
Start: 2024-01-01 | End: 2024-01-01

## 2024-01-01 RX ORDER — ONDANSETRON 2 MG/ML
4 INJECTION INTRAMUSCULAR; INTRAVENOUS EVERY 6 HOURS PRN
Status: DISCONTINUED | OUTPATIENT
Start: 2024-01-01 | End: 2024-01-01 | Stop reason: HOSPADM

## 2024-01-01 RX ORDER — SODIUM CHLORIDE 0.9 % (FLUSH) 0.9 %
5-40 SYRINGE (ML) INJECTION PRN
Status: CANCELLED | OUTPATIENT
Start: 2024-01-01

## 2024-01-01 RX ORDER — LABETALOL HYDROCHLORIDE 5 MG/ML
10 INJECTION, SOLUTION INTRAVENOUS
Status: DISCONTINUED | OUTPATIENT
Start: 2024-01-01 | End: 2024-01-01 | Stop reason: HOSPADM

## 2024-01-01 RX ORDER — LIDOCAINE HYDROCHLORIDE 10 MG/ML
INJECTION, SOLUTION INFILTRATION; PERINEURAL PRN
Status: DISCONTINUED | OUTPATIENT
Start: 2024-01-01 | End: 2024-01-01 | Stop reason: HOSPADM

## 2024-01-01 RX ORDER — PANTOPRAZOLE SODIUM 40 MG/10ML
40 INJECTION, POWDER, LYOPHILIZED, FOR SOLUTION INTRAVENOUS DAILY
Status: DISCONTINUED | OUTPATIENT
Start: 2024-01-01 | End: 2024-01-01 | Stop reason: HOSPADM

## 2024-01-01 RX ORDER — ACETAMINOPHEN 650 MG/1
650 SUPPOSITORY RECTAL EVERY 6 HOURS PRN
Status: DISCONTINUED | OUTPATIENT
Start: 2024-01-01 | End: 2024-01-01

## 2024-01-01 RX ORDER — MORPHINE SULFATE 2 MG/ML
2 INJECTION, SOLUTION INTRAMUSCULAR; INTRAVENOUS EVERY 4 HOURS PRN
Status: DISCONTINUED | OUTPATIENT
Start: 2024-01-01 | End: 2024-01-01

## 2024-01-01 RX ORDER — FUROSEMIDE 10 MG/ML
40 INJECTION INTRAMUSCULAR; INTRAVENOUS ONCE
Status: COMPLETED | OUTPATIENT
Start: 2024-01-01 | End: 2024-01-01

## 2024-01-01 RX ORDER — MEXILETINE HYDROCHLORIDE 200 MG/1
200 CAPSULE ORAL EVERY 8 HOURS SCHEDULED
Status: DISCONTINUED | OUTPATIENT
Start: 2024-01-01 | End: 2024-01-01 | Stop reason: HOSPADM

## 2024-01-01 RX ORDER — HYDROMORPHONE HYDROCHLORIDE 2 MG/ML
0.25 INJECTION, SOLUTION INTRAMUSCULAR; INTRAVENOUS; SUBCUTANEOUS EVERY 5 MIN PRN
Status: DISCONTINUED | OUTPATIENT
Start: 2024-01-01 | End: 2024-01-01 | Stop reason: HOSPADM

## 2024-01-01 RX ORDER — HYDRALAZINE HYDROCHLORIDE 20 MG/ML
10 INJECTION INTRAMUSCULAR; INTRAVENOUS
Status: DISCONTINUED | OUTPATIENT
Start: 2024-01-01 | End: 2024-01-01 | Stop reason: HOSPADM

## 2024-01-01 RX ORDER — LINEZOLID 2 MG/ML
600 INJECTION, SOLUTION INTRAVENOUS EVERY 12 HOURS
Status: DISCONTINUED | OUTPATIENT
Start: 2024-01-01 | End: 2024-01-01

## 2024-01-01 RX ORDER — HYDROMORPHONE HYDROCHLORIDE 2 MG/ML
0.5 INJECTION, SOLUTION INTRAMUSCULAR; INTRAVENOUS; SUBCUTANEOUS EVERY 5 MIN PRN
Status: DISCONTINUED | OUTPATIENT
Start: 2024-01-01 | End: 2024-01-01 | Stop reason: HOSPADM

## 2024-01-01 RX ORDER — HYDROMORPHONE HYDROCHLORIDE 1 MG/ML
1 INJECTION, SOLUTION INTRAMUSCULAR; INTRAVENOUS; SUBCUTANEOUS
Status: DISCONTINUED | OUTPATIENT
Start: 2024-01-01 | End: 2024-01-01 | Stop reason: HOSPADM

## 2024-01-01 RX ORDER — POTASSIUM CHLORIDE 29.8 MG/ML
20 INJECTION INTRAVENOUS PRN
Status: DISCONTINUED | OUTPATIENT
Start: 2024-01-01 | End: 2024-01-01 | Stop reason: HOSPADM

## 2024-01-01 RX ORDER — DEXMEDETOMIDINE HYDROCHLORIDE 4 UG/ML
.1-1.5 INJECTION, SOLUTION INTRAVENOUS CONTINUOUS
Status: DISCONTINUED | OUTPATIENT
Start: 2024-01-01 | End: 2024-01-01

## 2024-01-01 RX ORDER — SODIUM CHLORIDE 9 MG/ML
INJECTION, SOLUTION INTRAVENOUS PRN
Status: CANCELLED | OUTPATIENT
Start: 2024-01-01

## 2024-01-01 RX ORDER — POTASSIUM CHLORIDE 7.45 MG/ML
10 INJECTION INTRAVENOUS PRN
Status: DISCONTINUED | OUTPATIENT
Start: 2024-01-01 | End: 2024-01-01 | Stop reason: HOSPADM

## 2024-01-01 RX ORDER — ONDANSETRON 2 MG/ML
4 INJECTION INTRAMUSCULAR; INTRAVENOUS
Status: DISCONTINUED | OUTPATIENT
Start: 2024-01-01 | End: 2024-01-01 | Stop reason: HOSPADM

## 2024-01-01 RX ORDER — FINASTERIDE 5 MG/1
5 TABLET, FILM COATED ORAL DAILY
Status: DISCONTINUED | OUTPATIENT
Start: 2024-01-01 | End: 2024-01-01 | Stop reason: HOSPADM

## 2024-01-01 RX ORDER — IOPAMIDOL 755 MG/ML
INJECTION, SOLUTION INTRAVASCULAR PRN
Status: DISCONTINUED | OUTPATIENT
Start: 2024-01-01 | End: 2024-01-01 | Stop reason: HOSPADM

## 2024-01-01 RX ORDER — IPRATROPIUM BROMIDE AND ALBUTEROL SULFATE 2.5; .5 MG/3ML; MG/3ML
1 SOLUTION RESPIRATORY (INHALATION)
Status: DISCONTINUED | OUTPATIENT
Start: 2024-01-01 | End: 2024-01-01 | Stop reason: HOSPADM

## 2024-01-01 RX ADMIN — Medication 10 ML: at 08:01

## 2024-01-01 RX ADMIN — AMIODARONE HYDROCHLORIDE 400 MG: 200 TABLET ORAL at 15:05

## 2024-01-01 RX ADMIN — METOPROLOL TARTRATE 5 MG: 1 INJECTION, SOLUTION INTRAVENOUS at 22:30

## 2024-01-01 RX ADMIN — LINEZOLID 600 MG: 600 INJECTION, SOLUTION INTRAVENOUS at 22:12

## 2024-01-01 RX ADMIN — PIPERACILLIN AND TAZOBACTAM 3375 MG: 3; .375 INJECTION, POWDER, LYOPHILIZED, FOR SOLUTION INTRAVENOUS at 17:51

## 2024-01-01 RX ADMIN — Medication 10 ML: at 20:59

## 2024-01-01 RX ADMIN — POTASSIUM CHLORIDE 20 MEQ: 29.8 INJECTION, SOLUTION INTRAVENOUS at 06:38

## 2024-01-01 RX ADMIN — PANTOPRAZOLE SODIUM 40 MG: 40 INJECTION, POWDER, FOR SOLUTION INTRAVENOUS at 08:29

## 2024-01-01 RX ADMIN — MEROPENEM 1000 MG: 1 INJECTION INTRAVENOUS at 18:33

## 2024-01-01 RX ADMIN — PIPERACILLIN AND TAZOBACTAM 3375 MG: 3; .375 INJECTION, POWDER, LYOPHILIZED, FOR SOLUTION INTRAVENOUS at 11:00

## 2024-01-01 RX ADMIN — CARVEDILOL 3.12 MG: 3.12 TABLET, FILM COATED ORAL at 16:42

## 2024-01-01 RX ADMIN — NITROGLYCERIN 0.5 INCH: 20 OINTMENT TOPICAL at 09:46

## 2024-01-01 RX ADMIN — CARVEDILOL 6.25 MG: 6.25 TABLET, FILM COATED ORAL at 18:11

## 2024-01-01 RX ADMIN — PIPERACILLIN AND TAZOBACTAM 3375 MG: 3; .375 INJECTION, POWDER, LYOPHILIZED, FOR SOLUTION INTRAVENOUS at 01:33

## 2024-01-01 RX ADMIN — ASPIRIN 81 MG: 81 TABLET, COATED ORAL at 07:58

## 2024-01-01 RX ADMIN — Medication 10 ML: at 08:54

## 2024-01-01 RX ADMIN — SODIUM CHLORIDE 1 MG: 9 INJECTION INTRAMUSCULAR; INTRAVENOUS; SUBCUTANEOUS at 06:21

## 2024-01-01 RX ADMIN — VANCOMYCIN HYDROCHLORIDE 1000 MG: 1 INJECTION, POWDER, LYOPHILIZED, FOR SOLUTION INTRAVENOUS at 14:30

## 2024-01-01 RX ADMIN — Medication 1 CAPSULE: at 17:17

## 2024-01-01 RX ADMIN — AMIODARONE HYDROCHLORIDE 400 MG: 200 TABLET ORAL at 10:14

## 2024-01-01 RX ADMIN — PANTOPRAZOLE SODIUM 40 MG: 40 INJECTION, POWDER, FOR SOLUTION INTRAVENOUS at 10:14

## 2024-01-01 RX ADMIN — Medication 1 CAPSULE: at 16:42

## 2024-01-01 RX ADMIN — PANTOPRAZOLE SODIUM 40 MG: 40 INJECTION, POWDER, FOR SOLUTION INTRAVENOUS at 08:01

## 2024-01-01 RX ADMIN — PANTOPRAZOLE SODIUM 40 MG: 40 INJECTION, POWDER, FOR SOLUTION INTRAVENOUS at 09:46

## 2024-01-01 RX ADMIN — POTASSIUM CHLORIDE 20 MEQ: 29.8 INJECTION, SOLUTION INTRAVENOUS at 02:34

## 2024-01-01 RX ADMIN — SACUBITRIL AND VALSARTAN 0.5 TABLET: 24; 26 TABLET, FILM COATED ORAL at 20:52

## 2024-01-01 RX ADMIN — SODIUM CHLORIDE, PRESERVATIVE FREE 10 ML: 5 INJECTION INTRAVENOUS at 20:05

## 2024-01-01 RX ADMIN — MEXILETINE HYDROCHLORIDE 200 MG: 200 CAPSULE ORAL at 04:36

## 2024-01-01 RX ADMIN — PIPERACILLIN AND TAZOBACTAM 3375 MG: 3; .375 INJECTION, POWDER, LYOPHILIZED, FOR SOLUTION INTRAVENOUS at 18:02

## 2024-01-01 RX ADMIN — Medication 50 MCG/HR: at 01:00

## 2024-01-01 RX ADMIN — ASPIRIN 81 MG: 81 TABLET, COATED ORAL at 08:42

## 2024-01-01 RX ADMIN — PANTOPRAZOLE SODIUM 40 MG: 40 INJECTION, POWDER, FOR SOLUTION INTRAVENOUS at 08:57

## 2024-01-01 RX ADMIN — IOPAMIDOL 75 ML: 755 INJECTION, SOLUTION INTRAVENOUS at 15:38

## 2024-01-01 RX ADMIN — SODIUM CHLORIDE 1 MG: 9 INJECTION INTRAMUSCULAR; INTRAVENOUS; SUBCUTANEOUS at 17:22

## 2024-01-01 RX ADMIN — Medication 1 CAPSULE: at 16:38

## 2024-01-01 RX ADMIN — SODIUM CHLORIDE, PRESERVATIVE FREE 10 ML: 5 INJECTION INTRAVENOUS at 20:27

## 2024-01-01 RX ADMIN — TAMSULOSIN HYDROCHLORIDE 0.4 MG: 0.4 CAPSULE ORAL at 20:52

## 2024-01-01 RX ADMIN — AMIODARONE HYDROCHLORIDE 200 MG: 200 TABLET ORAL at 09:04

## 2024-01-01 RX ADMIN — NITROGLYCERIN 0.5 INCH: 20 OINTMENT TOPICAL at 02:01

## 2024-01-01 RX ADMIN — PANTOPRAZOLE SODIUM 40 MG: 40 INJECTION, POWDER, FOR SOLUTION INTRAVENOUS at 08:42

## 2024-01-01 RX ADMIN — Medication 10 ML: at 21:06

## 2024-01-01 RX ADMIN — PANTOPRAZOLE SODIUM 40 MG: 40 INJECTION, POWDER, FOR SOLUTION INTRAVENOUS at 07:50

## 2024-01-01 RX ADMIN — FINASTERIDE 5 MG: 5 TABLET, FILM COATED ORAL at 08:53

## 2024-01-01 RX ADMIN — PIPERACILLIN AND TAZOBACTAM 3375 MG: 3; .375 INJECTION, POWDER, LYOPHILIZED, FOR SOLUTION INTRAVENOUS at 02:11

## 2024-01-01 RX ADMIN — SODIUM CHLORIDE, PRESERVATIVE FREE 10 ML: 5 INJECTION INTRAVENOUS at 09:11

## 2024-01-01 RX ADMIN — Medication 10 ML: at 21:04

## 2024-01-01 RX ADMIN — MEROPENEM 1000 MG: 1 INJECTION INTRAVENOUS at 01:58

## 2024-01-01 RX ADMIN — AMIODARONE HYDROCHLORIDE 1 MG/MIN: 50 INJECTION, SOLUTION INTRAVENOUS at 04:50

## 2024-01-01 RX ADMIN — AMIODARONE HYDROCHLORIDE 400 MG: 200 TABLET ORAL at 09:06

## 2024-01-01 RX ADMIN — ETOMIDATE 20 MG: 2 INJECTION, SOLUTION INTRAVENOUS at 19:52

## 2024-01-01 RX ADMIN — SODIUM CHLORIDE 1 MG: 9 INJECTION INTRAMUSCULAR; INTRAVENOUS; SUBCUTANEOUS at 09:55

## 2024-01-01 RX ADMIN — LINEZOLID 600 MG: 600 INJECTION, SOLUTION INTRAVENOUS at 09:29

## 2024-01-01 RX ADMIN — PIPERACILLIN AND TAZOBACTAM 3375 MG: 3; .375 INJECTION, POWDER, LYOPHILIZED, FOR SOLUTION INTRAVENOUS at 01:28

## 2024-01-01 RX ADMIN — ASPIRIN 81 MG: 81 TABLET, COATED ORAL at 08:53

## 2024-01-01 RX ADMIN — OXYBUTYNIN CHLORIDE 5 MG: 5 TABLET ORAL at 13:50

## 2024-01-01 RX ADMIN — NITROGLYCERIN 0.5 INCH: 20 OINTMENT TOPICAL at 03:15

## 2024-01-01 RX ADMIN — FINASTERIDE 5 MG: 5 TABLET, FILM COATED ORAL at 08:29

## 2024-01-01 RX ADMIN — METOPROLOL SUCCINATE 25 MG: 25 TABLET, FILM COATED, EXTENDED RELEASE ORAL at 08:59

## 2024-01-01 RX ADMIN — PIPERACILLIN AND TAZOBACTAM 3375 MG: 3; .375 INJECTION, POWDER, LYOPHILIZED, FOR SOLUTION INTRAVENOUS at 17:50

## 2024-01-01 RX ADMIN — ASPIRIN 81 MG: 81 TABLET, COATED ORAL at 08:29

## 2024-01-01 RX ADMIN — PIPERACILLIN AND TAZOBACTAM 3375 MG: 3; .375 INJECTION, POWDER, LYOPHILIZED, FOR SOLUTION INTRAVENOUS at 11:40

## 2024-01-01 RX ADMIN — NITROGLYCERIN 0.5 INCH: 20 OINTMENT TOPICAL at 13:04

## 2024-01-01 RX ADMIN — PANTOPRAZOLE SODIUM 40 MG: 40 INJECTION, POWDER, FOR SOLUTION INTRAVENOUS at 09:05

## 2024-01-01 RX ADMIN — AMIODARONE HYDROCHLORIDE 400 MG: 200 TABLET ORAL at 09:04

## 2024-01-01 RX ADMIN — FINASTERIDE 5 MG: 5 TABLET, FILM COATED ORAL at 09:05

## 2024-01-01 RX ADMIN — PIPERACILLIN AND TAZOBACTAM 3375 MG: 3; .375 INJECTION, POWDER, LYOPHILIZED, FOR SOLUTION INTRAVENOUS at 01:36

## 2024-01-01 RX ADMIN — Medication 10 ML: at 07:59

## 2024-01-01 RX ADMIN — SACUBITRIL AND VALSARTAN 0.5 TABLET: 24; 26 TABLET, FILM COATED ORAL at 20:59

## 2024-01-01 RX ADMIN — TAMSULOSIN HYDROCHLORIDE 0.4 MG: 0.4 CAPSULE ORAL at 19:58

## 2024-01-01 RX ADMIN — POTASSIUM CHLORIDE 40 MEQ: 1500 TABLET, EXTENDED RELEASE ORAL at 08:28

## 2024-01-01 RX ADMIN — PIPERACILLIN AND TAZOBACTAM 3375 MG: 3; .375 INJECTION, POWDER, LYOPHILIZED, FOR SOLUTION INTRAVENOUS at 10:22

## 2024-01-01 RX ADMIN — ASPIRIN 81 MG: 81 TABLET, COATED ORAL at 13:04

## 2024-01-01 RX ADMIN — SODIUM CHLORIDE, POTASSIUM CHLORIDE, SODIUM LACTATE AND CALCIUM CHLORIDE: 600; 310; 30; 20 INJECTION, SOLUTION INTRAVENOUS at 17:53

## 2024-01-01 RX ADMIN — PIPERACILLIN AND TAZOBACTAM 3375 MG: 3; .375 INJECTION, POWDER, LYOPHILIZED, FOR SOLUTION INTRAVENOUS at 02:06

## 2024-01-01 RX ADMIN — POTASSIUM CHLORIDE 20 MEQ: 1500 TABLET, EXTENDED RELEASE ORAL at 18:11

## 2024-01-01 RX ADMIN — PANTOPRAZOLE SODIUM 40 MG: 40 INJECTION, POWDER, FOR SOLUTION INTRAVENOUS at 09:06

## 2024-01-01 RX ADMIN — MEXILETINE HYDROCHLORIDE 200 MG: 200 CAPSULE ORAL at 12:31

## 2024-01-01 RX ADMIN — ACETAMINOPHEN 650 MG: 325 TABLET ORAL at 13:50

## 2024-01-01 RX ADMIN — POTASSIUM CHLORIDE 20 MEQ: 1500 TABLET, EXTENDED RELEASE ORAL at 17:17

## 2024-01-01 RX ADMIN — SACUBITRIL AND VALSARTAN 0.5 TABLET: 24; 26 TABLET, FILM COATED ORAL at 19:58

## 2024-01-01 RX ADMIN — OXYBUTYNIN CHLORIDE 5 MG: 5 TABLET ORAL at 09:00

## 2024-01-01 RX ADMIN — CARVEDILOL 3.12 MG: 3.12 TABLET, FILM COATED ORAL at 18:04

## 2024-01-01 RX ADMIN — CARVEDILOL 6.25 MG: 6.25 TABLET, FILM COATED ORAL at 17:10

## 2024-01-01 RX ADMIN — CARVEDILOL 6.25 MG: 6.25 TABLET, FILM COATED ORAL at 08:52

## 2024-01-01 RX ADMIN — PIPERACILLIN AND TAZOBACTAM 3375 MG: 3; .375 INJECTION, POWDER, LYOPHILIZED, FOR SOLUTION INTRAVENOUS at 09:57

## 2024-01-01 RX ADMIN — POTASSIUM CHLORIDE 40 MEQ: 1500 TABLET, EXTENDED RELEASE ORAL at 07:10

## 2024-01-01 RX ADMIN — LINEZOLID 600 MG: 600 INJECTION, SOLUTION INTRAVENOUS at 21:36

## 2024-01-01 RX ADMIN — Medication 10 ML: at 20:23

## 2024-01-01 RX ADMIN — OXYBUTYNIN CHLORIDE 5 MG: 5 TABLET ORAL at 13:10

## 2024-01-01 RX ADMIN — SODIUM CHLORIDE, PRESERVATIVE FREE 10 ML: 5 INJECTION INTRAVENOUS at 08:53

## 2024-01-01 RX ADMIN — PERFLUTREN 1.5 ML: 6.52 INJECTION, SUSPENSION INTRAVENOUS at 09:28

## 2024-01-01 RX ADMIN — CEFEPIME 2000 MG: 2 INJECTION, POWDER, FOR SOLUTION INTRAVENOUS at 22:43

## 2024-01-01 RX ADMIN — ACETAMINOPHEN 650 MG: 325 TABLET ORAL at 12:55

## 2024-01-01 RX ADMIN — MAGNESIUM SULFATE HEPTAHYDRATE 2000 MG: 40 INJECTION, SOLUTION INTRAVENOUS at 10:25

## 2024-01-01 RX ADMIN — SODIUM CHLORIDE, POTASSIUM CHLORIDE, SODIUM LACTATE AND CALCIUM CHLORIDE: 600; 310; 30; 20 INJECTION, SOLUTION INTRAVENOUS at 15:17

## 2024-01-01 RX ADMIN — AMIODARONE HYDROCHLORIDE 0.5 MG/MIN: 50 INJECTION, SOLUTION INTRAVENOUS at 21:46

## 2024-01-01 RX ADMIN — ACETAMINOPHEN 650 MG: 325 TABLET ORAL at 14:26

## 2024-01-01 RX ADMIN — ALBUMIN (HUMAN) 12.5 G: 12.5 INJECTION, SOLUTION INTRAVENOUS at 16:07

## 2024-01-01 RX ADMIN — SACUBITRIL AND VALSARTAN 0.5 TABLET: 24; 26 TABLET, FILM COATED ORAL at 21:10

## 2024-01-01 RX ADMIN — SODIUM CHLORIDE 500 ML: 9 INJECTION, SOLUTION INTRAVENOUS at 19:29

## 2024-01-01 RX ADMIN — MEROPENEM 1000 MG: 1 INJECTION INTRAVENOUS at 09:16

## 2024-01-01 RX ADMIN — Medication 10 ML: at 21:36

## 2024-01-01 RX ADMIN — FINASTERIDE 5 MG: 5 TABLET, FILM COATED ORAL at 08:59

## 2024-01-01 RX ADMIN — SODIUM CHLORIDE, PRESERVATIVE FREE 0.5 MG: 5 INJECTION INTRAVENOUS at 18:55

## 2024-01-01 RX ADMIN — SODIUM CHLORIDE, PRESERVATIVE FREE 10 ML: 5 INJECTION INTRAVENOUS at 19:58

## 2024-01-01 RX ADMIN — Medication 10 ML: at 08:57

## 2024-01-01 RX ADMIN — Medication 1 CAPSULE: at 08:53

## 2024-01-01 RX ADMIN — Medication 1 CAPSULE: at 18:32

## 2024-01-01 RX ADMIN — PIPERACILLIN AND TAZOBACTAM 3375 MG: 3; .375 INJECTION, POWDER, LYOPHILIZED, FOR SOLUTION INTRAVENOUS at 08:14

## 2024-01-01 RX ADMIN — PIPERACILLIN AND TAZOBACTAM 3375 MG: 3; .375 INJECTION, POWDER, LYOPHILIZED, FOR SOLUTION INTRAVENOUS at 02:28

## 2024-01-01 RX ADMIN — MIDAZOLAM 2 MG: 1 INJECTION INTRAMUSCULAR; INTRAVENOUS at 22:35

## 2024-01-01 RX ADMIN — AMIODARONE HYDROCHLORIDE 400 MG: 200 TABLET ORAL at 10:05

## 2024-01-01 RX ADMIN — DEXMEDETOMIDINE HYDROCHLORIDE 0.2 MCG/KG/HR: 400 INJECTION, SOLUTION INTRAVENOUS at 07:39

## 2024-01-01 RX ADMIN — SODIUM CHLORIDE, PRESERVATIVE FREE 10 ML: 5 INJECTION INTRAVENOUS at 21:16

## 2024-01-01 RX ADMIN — SODIUM CHLORIDE, POTASSIUM CHLORIDE, SODIUM LACTATE AND CALCIUM CHLORIDE: 600; 310; 30; 20 INJECTION, SOLUTION INTRAVENOUS at 06:12

## 2024-01-01 RX ADMIN — Medication 1 CAPSULE: at 17:21

## 2024-01-01 RX ADMIN — POTASSIUM CHLORIDE 20 MEQ: 29.8 INJECTION, SOLUTION INTRAVENOUS at 07:12

## 2024-01-01 RX ADMIN — AMIODARONE HYDROCHLORIDE 400 MG: 200 TABLET ORAL at 08:01

## 2024-01-01 RX ADMIN — Medication 10 ML: at 08:42

## 2024-01-01 RX ADMIN — Medication 1 CAPSULE: at 18:04

## 2024-01-01 RX ADMIN — OXYBUTYNIN CHLORIDE 5 MG: 5 TABLET ORAL at 14:15

## 2024-01-01 RX ADMIN — PIPERACILLIN AND TAZOBACTAM 3375 MG: 3; .375 INJECTION, POWDER, LYOPHILIZED, FOR SOLUTION INTRAVENOUS at 18:07

## 2024-01-01 RX ADMIN — OXYBUTYNIN CHLORIDE 5 MG: 5 TABLET ORAL at 09:04

## 2024-01-01 RX ADMIN — CARVEDILOL 3.12 MG: 3.12 TABLET, FILM COATED ORAL at 17:00

## 2024-01-01 RX ADMIN — Medication 1 CAPSULE: at 16:55

## 2024-01-01 RX ADMIN — Medication 1 CAPSULE: at 08:56

## 2024-01-01 RX ADMIN — APIXABAN 5 MG: 5 TABLET, FILM COATED ORAL at 21:05

## 2024-01-01 RX ADMIN — PHENYLEPHRINE HYDROCHLORIDE 10 MCG/MIN: 10 INJECTION INTRAVENOUS at 11:26

## 2024-01-01 RX ADMIN — PANTOPRAZOLE SODIUM 40 MG: 40 INJECTION, POWDER, FOR SOLUTION INTRAVENOUS at 08:37

## 2024-01-01 RX ADMIN — PANTOPRAZOLE SODIUM 40 MG: 40 INJECTION, POWDER, FOR SOLUTION INTRAVENOUS at 08:53

## 2024-01-01 RX ADMIN — FINASTERIDE 5 MG: 5 TABLET, FILM COATED ORAL at 09:04

## 2024-01-01 RX ADMIN — CARVEDILOL 3.12 MG: 3.12 TABLET, FILM COATED ORAL at 09:03

## 2024-01-01 RX ADMIN — BENZOCAINE AND MENTHOL 1 LOZENGE: 15; 3.6 LOZENGE ORAL at 19:30

## 2024-01-01 RX ADMIN — POTASSIUM CHLORIDE 20 MEQ: 29.8 INJECTION, SOLUTION INTRAVENOUS at 04:51

## 2024-01-01 RX ADMIN — Medication 1 CAPSULE: at 08:29

## 2024-01-01 RX ADMIN — OXYBUTYNIN CHLORIDE 5 MG: 5 TABLET ORAL at 09:21

## 2024-01-01 RX ADMIN — SODIUM CHLORIDE, POTASSIUM CHLORIDE, SODIUM LACTATE AND CALCIUM CHLORIDE: 600; 310; 30; 20 INJECTION, SOLUTION INTRAVENOUS at 19:22

## 2024-01-01 RX ADMIN — OXYBUTYNIN CHLORIDE 5 MG: 5 TABLET ORAL at 20:29

## 2024-01-01 RX ADMIN — CARVEDILOL 6.25 MG: 6.25 TABLET, FILM COATED ORAL at 17:17

## 2024-01-01 RX ADMIN — SODIUM CHLORIDE 1 MG: 9 INJECTION INTRAMUSCULAR; INTRAVENOUS; SUBCUTANEOUS at 19:55

## 2024-01-01 RX ADMIN — MEROPENEM 1000 MG: 1 INJECTION INTRAVENOUS at 12:11

## 2024-01-01 RX ADMIN — METOPROLOL SUCCINATE 25 MG: 25 TABLET, FILM COATED, EXTENDED RELEASE ORAL at 09:05

## 2024-01-01 RX ADMIN — PROPOFOL 30 MCG/KG/MIN: 10 INJECTION, EMULSION INTRAVENOUS at 02:28

## 2024-01-01 RX ADMIN — NITROGLYCERIN 0.5 INCH: 20 OINTMENT TOPICAL at 16:51

## 2024-01-01 RX ADMIN — POTASSIUM CHLORIDE 20 MEQ: 1500 TABLET, EXTENDED RELEASE ORAL at 09:06

## 2024-01-01 RX ADMIN — Medication 1 CAPSULE: at 09:03

## 2024-01-01 RX ADMIN — OXYBUTYNIN CHLORIDE 5 MG: 5 TABLET ORAL at 14:00

## 2024-01-01 RX ADMIN — Medication 10 ML: at 09:15

## 2024-01-01 RX ADMIN — SODIUM CHLORIDE, POTASSIUM CHLORIDE, SODIUM LACTATE AND CALCIUM CHLORIDE: 600; 310; 30; 20 INJECTION, SOLUTION INTRAVENOUS at 00:31

## 2024-01-01 RX ADMIN — POTASSIUM CHLORIDE 20 MEQ: 1500 TABLET, EXTENDED RELEASE ORAL at 08:53

## 2024-01-01 RX ADMIN — Medication 10 ML: at 10:26

## 2024-01-01 RX ADMIN — CARVEDILOL 3.12 MG: 3.12 TABLET, FILM COATED ORAL at 08:53

## 2024-01-01 RX ADMIN — SODIUM CHLORIDE, POTASSIUM CHLORIDE, SODIUM LACTATE AND CALCIUM CHLORIDE: 600; 310; 30; 20 INJECTION, SOLUTION INTRAVENOUS at 15:50

## 2024-01-01 RX ADMIN — Medication 10 ML: at 08:02

## 2024-01-01 RX ADMIN — PIPERACILLIN AND TAZOBACTAM 3375 MG: 3; .375 INJECTION, POWDER, LYOPHILIZED, FOR SOLUTION INTRAVENOUS at 09:32

## 2024-01-01 RX ADMIN — OXYBUTYNIN CHLORIDE 5 MG: 5 TABLET ORAL at 14:12

## 2024-01-01 RX ADMIN — LINEZOLID 600 MG: 600 INJECTION, SOLUTION INTRAVENOUS at 10:52

## 2024-01-01 RX ADMIN — AMIODARONE HYDROCHLORIDE 1 MG/MIN: 50 INJECTION, SOLUTION INTRAVENOUS at 20:58

## 2024-01-01 RX ADMIN — MORPHINE SULFATE 2 MG: 2 INJECTION, SOLUTION INTRAMUSCULAR; INTRAVENOUS at 22:31

## 2024-01-01 RX ADMIN — ASPIRIN 81 MG: 81 TABLET, COATED ORAL at 08:01

## 2024-01-01 RX ADMIN — SODIUM CHLORIDE, POTASSIUM CHLORIDE, SODIUM LACTATE AND CALCIUM CHLORIDE: 600; 310; 30; 20 INJECTION, SOLUTION INTRAVENOUS at 23:06

## 2024-01-01 RX ADMIN — ASPIRIN 81 MG: 81 TABLET, COATED ORAL at 08:59

## 2024-01-01 RX ADMIN — NITROGLYCERIN 0.5 INCH: 20 OINTMENT TOPICAL at 01:10

## 2024-01-01 RX ADMIN — ASPIRIN 81 MG: 81 TABLET, COATED ORAL at 08:55

## 2024-01-01 RX ADMIN — MAGNESIUM SULFATE HEPTAHYDRATE 2000 MG: 40 INJECTION, SOLUTION INTRAVENOUS at 12:30

## 2024-01-01 RX ADMIN — MEROPENEM 1000 MG: 1 INJECTION INTRAVENOUS at 02:31

## 2024-01-01 RX ADMIN — Medication 1 CAPSULE: at 08:42

## 2024-01-01 RX ADMIN — ROCURONIUM BROMIDE 75 MG: 10 INJECTION, SOLUTION INTRAVENOUS at 19:52

## 2024-01-01 RX ADMIN — ASPIRIN 81 MG: 81 TABLET, COATED ORAL at 10:05

## 2024-01-01 RX ADMIN — POTASSIUM CHLORIDE 20 MEQ: 29.8 INJECTION, SOLUTION INTRAVENOUS at 06:06

## 2024-01-01 RX ADMIN — SODIUM CHLORIDE, POTASSIUM CHLORIDE, SODIUM LACTATE AND CALCIUM CHLORIDE: 600; 310; 30; 20 INJECTION, SOLUTION INTRAVENOUS at 14:44

## 2024-01-01 RX ADMIN — POTASSIUM CHLORIDE 20 MEQ: 1500 TABLET, EXTENDED RELEASE ORAL at 12:24

## 2024-01-01 RX ADMIN — PANTOPRAZOLE SODIUM 40 MG: 40 INJECTION, POWDER, FOR SOLUTION INTRAVENOUS at 07:58

## 2024-01-01 RX ADMIN — OXYBUTYNIN CHLORIDE 5 MG: 5 TABLET ORAL at 20:06

## 2024-01-01 RX ADMIN — ASPIRIN 81 MG: 81 TABLET, COATED ORAL at 09:06

## 2024-01-01 RX ADMIN — SACUBITRIL AND VALSARTAN 0.5 TABLET: 24; 26 TABLET, FILM COATED ORAL at 20:05

## 2024-01-01 RX ADMIN — OXYBUTYNIN CHLORIDE 5 MG: 5 TABLET ORAL at 21:10

## 2024-01-01 RX ADMIN — FINASTERIDE 5 MG: 5 TABLET, FILM COATED ORAL at 07:50

## 2024-01-01 RX ADMIN — APIXABAN 5 MG: 5 TABLET, FILM COATED ORAL at 07:58

## 2024-01-01 RX ADMIN — Medication 1 CAPSULE: at 09:00

## 2024-01-01 RX ADMIN — PIPERACILLIN AND TAZOBACTAM 3375 MG: 3; .375 INJECTION, POWDER, LYOPHILIZED, FOR SOLUTION INTRAVENOUS at 01:46

## 2024-01-01 RX ADMIN — SACUBITRIL AND VALSARTAN 0.5 TABLET: 24; 26 TABLET, FILM COATED ORAL at 21:36

## 2024-01-01 RX ADMIN — POTASSIUM CHLORIDE 20 MEQ: 1500 TABLET, EXTENDED RELEASE ORAL at 10:05

## 2024-01-01 RX ADMIN — Medication 1 CAPSULE: at 16:29

## 2024-01-01 RX ADMIN — PIPERACILLIN AND TAZOBACTAM 3375 MG: 3; .375 INJECTION, POWDER, LYOPHILIZED, FOR SOLUTION INTRAVENOUS at 01:40

## 2024-01-01 RX ADMIN — BENZOCAINE AND MENTHOL 1 LOZENGE: 15; 3.6 LOZENGE ORAL at 15:19

## 2024-01-01 RX ADMIN — SODIUM CHLORIDE 1500 MG: 9 INJECTION, SOLUTION INTRAVENOUS at 23:19

## 2024-01-01 RX ADMIN — SACUBITRIL AND VALSARTAN 0.5 TABLET: 24; 26 TABLET, FILM COATED ORAL at 19:57

## 2024-01-01 RX ADMIN — ASPIRIN 81 MG: 81 TABLET, COATED ORAL at 09:03

## 2024-01-01 RX ADMIN — SACUBITRIL AND VALSARTAN 0.5 TABLET: 24; 26 TABLET, FILM COATED ORAL at 20:04

## 2024-01-01 RX ADMIN — TAMSULOSIN HYDROCHLORIDE 0.4 MG: 0.4 CAPSULE ORAL at 20:04

## 2024-01-01 RX ADMIN — Medication 10 ML: at 20:00

## 2024-01-01 RX ADMIN — OXYBUTYNIN CHLORIDE 5 MG: 5 TABLET ORAL at 15:05

## 2024-01-01 RX ADMIN — AMIODARONE HYDROCHLORIDE 400 MG: 200 TABLET ORAL at 08:59

## 2024-01-01 RX ADMIN — PHENYLEPHRINE HYDROCHLORIDE 30 MCG/MIN: 50 INJECTION INTRAVENOUS at 20:26

## 2024-01-01 RX ADMIN — ACETAMINOPHEN 650 MG: 325 TABLET ORAL at 08:08

## 2024-01-01 RX ADMIN — Medication 10 ML: at 08:37

## 2024-01-01 RX ADMIN — PIPERACILLIN AND TAZOBACTAM 3375 MG: 3; .375 INJECTION, POWDER, LYOPHILIZED, FOR SOLUTION INTRAVENOUS at 17:32

## 2024-01-01 RX ADMIN — TAMSULOSIN HYDROCHLORIDE 0.4 MG: 0.4 CAPSULE ORAL at 20:29

## 2024-01-01 RX ADMIN — HYDROMORPHONE HYDROCHLORIDE 1 MG: 1 INJECTION, SOLUTION INTRAMUSCULAR; INTRAVENOUS; SUBCUTANEOUS at 17:22

## 2024-01-01 RX ADMIN — CARVEDILOL 6.25 MG: 6.25 TABLET, FILM COATED ORAL at 10:04

## 2024-01-01 RX ADMIN — Medication 1 CAPSULE: at 19:29

## 2024-01-01 RX ADMIN — PIPERACILLIN AND TAZOBACTAM 3375 MG: 3; .375 INJECTION, POWDER, LYOPHILIZED, FOR SOLUTION INTRAVENOUS at 10:16

## 2024-01-01 RX ADMIN — LIDOCAINE HYDROCHLORIDE 1 MG/MIN: 4 INJECTION, SOLUTION INTRAVENOUS at 10:11

## 2024-01-01 RX ADMIN — Medication 1 CAPSULE: at 10:05

## 2024-01-01 RX ADMIN — Medication 1 CAPSULE: at 17:00

## 2024-01-01 RX ADMIN — NITROGLYCERIN 0.5 INCH: 20 OINTMENT TOPICAL at 21:06

## 2024-01-01 RX ADMIN — SODIUM CHLORIDE 0.5 MG: 9 INJECTION INTRAMUSCULAR; INTRAVENOUS; SUBCUTANEOUS at 12:25

## 2024-01-01 RX ADMIN — METOPROLOL SUCCINATE 25 MG: 25 TABLET, FILM COATED, EXTENDED RELEASE ORAL at 08:29

## 2024-01-01 RX ADMIN — Medication 10 ML: at 20:07

## 2024-01-01 RX ADMIN — Medication 10 ML: at 10:27

## 2024-01-01 RX ADMIN — HYDROMORPHONE HYDROCHLORIDE 0.25 MG: 2 INJECTION, SOLUTION INTRAMUSCULAR; INTRAVENOUS; SUBCUTANEOUS at 16:15

## 2024-01-01 RX ADMIN — AMIODARONE HYDROCHLORIDE 1 MG/MIN: 50 INJECTION, SOLUTION INTRAVENOUS at 15:30

## 2024-01-01 RX ADMIN — ASPIRIN 81 MG: 81 TABLET, COATED ORAL at 07:49

## 2024-01-01 RX ADMIN — BENZOCAINE AND MENTHOL 1 LOZENGE: 15; 3.6 LOZENGE ORAL at 04:06

## 2024-01-01 RX ADMIN — Medication 10 ML: at 09:06

## 2024-01-01 RX ADMIN — SODIUM CHLORIDE, PRESERVATIVE FREE 10 ML: 5 INJECTION INTRAVENOUS at 09:05

## 2024-01-01 RX ADMIN — AMIODARONE HYDROCHLORIDE 400 MG: 200 TABLET ORAL at 05:32

## 2024-01-01 RX ADMIN — Medication 10 ML: at 07:50

## 2024-01-01 RX ADMIN — SACUBITRIL AND VALSARTAN 0.5 TABLET: 24; 26 TABLET, FILM COATED ORAL at 21:02

## 2024-01-01 RX ADMIN — OXYBUTYNIN CHLORIDE 5 MG: 5 TABLET ORAL at 08:29

## 2024-01-01 RX ADMIN — NITROGLYCERIN 0.5 INCH: 20 OINTMENT TOPICAL at 23:52

## 2024-01-01 RX ADMIN — AMIODARONE HYDROCHLORIDE 400 MG: 200 TABLET ORAL at 08:42

## 2024-01-01 RX ADMIN — SODIUM CHLORIDE, PRESERVATIVE FREE 10 ML: 5 INJECTION INTRAVENOUS at 08:29

## 2024-01-01 RX ADMIN — SODIUM CHLORIDE: 9 INJECTION, SOLUTION INTRAVENOUS at 17:58

## 2024-01-01 RX ADMIN — APIXABAN 5 MG: 5 TABLET, FILM COATED ORAL at 13:04

## 2024-01-01 RX ADMIN — PHENYLEPHRINE HYDROCHLORIDE 25 MCG/MIN: 10 INJECTION INTRAVENOUS at 22:00

## 2024-01-01 RX ADMIN — Medication 1 CAPSULE: at 10:14

## 2024-01-01 RX ADMIN — SODIUM CHLORIDE, POTASSIUM CHLORIDE, SODIUM LACTATE AND CALCIUM CHLORIDE: 600; 310; 30; 20 INJECTION, SOLUTION INTRAVENOUS at 19:36

## 2024-01-01 RX ADMIN — PIPERACILLIN AND TAZOBACTAM 3375 MG: 3; .375 INJECTION, POWDER, LYOPHILIZED, FOR SOLUTION INTRAVENOUS at 18:10

## 2024-01-01 RX ADMIN — FINASTERIDE 5 MG: 5 TABLET, FILM COATED ORAL at 08:01

## 2024-01-01 RX ADMIN — MAGNESIUM SULFATE HEPTAHYDRATE 2000 MG: 40 INJECTION, SOLUTION INTRAVENOUS at 02:43

## 2024-01-01 RX ADMIN — SODIUM CHLORIDE 1000 ML: 9 INJECTION, SOLUTION INTRAVENOUS at 19:52

## 2024-01-01 RX ADMIN — AMIODARONE HYDROCHLORIDE 400 MG: 200 TABLET ORAL at 08:52

## 2024-01-01 RX ADMIN — CARVEDILOL 3.12 MG: 3.12 TABLET, FILM COATED ORAL at 16:28

## 2024-01-01 RX ADMIN — Medication 1 CAPSULE: at 08:01

## 2024-01-01 RX ADMIN — AMIODARONE HYDROCHLORIDE 400 MG: 200 TABLET ORAL at 08:53

## 2024-01-01 RX ADMIN — Medication 1 CAPSULE: at 17:10

## 2024-01-01 RX ADMIN — SODIUM CHLORIDE 1 MG: 9 INJECTION INTRAMUSCULAR; INTRAVENOUS; SUBCUTANEOUS at 13:00

## 2024-01-01 RX ADMIN — PIPERACILLIN AND TAZOBACTAM 3375 MG: 3; .375 INJECTION, POWDER, LYOPHILIZED, FOR SOLUTION INTRAVENOUS at 18:01

## 2024-01-01 RX ADMIN — ASPIRIN 81 MG: 81 TABLET, COATED ORAL at 10:14

## 2024-01-01 RX ADMIN — AMIODARONE HYDROCHLORIDE 200 MG: 200 TABLET ORAL at 07:49

## 2024-01-01 RX ADMIN — Medication 10 ML: at 19:57

## 2024-01-01 RX ADMIN — Medication 1 CAPSULE: at 17:47

## 2024-01-01 RX ADMIN — OXYBUTYNIN CHLORIDE 5 MG: 5 TABLET ORAL at 16:07

## 2024-01-01 RX ADMIN — NITROGLYCERIN 0.5 INCH: 20 OINTMENT TOPICAL at 04:43

## 2024-01-01 RX ADMIN — Medication 1 CAPSULE: at 18:11

## 2024-01-01 RX ADMIN — PANTOPRAZOLE SODIUM 40 MG: 40 INJECTION, POWDER, FOR SOLUTION INTRAVENOUS at 10:52

## 2024-01-01 RX ADMIN — FUROSEMIDE 40 MG: 10 INJECTION, SOLUTION INTRAMUSCULAR; INTRAVENOUS at 18:01

## 2024-01-01 RX ADMIN — PIPERACILLIN AND TAZOBACTAM 3375 MG: 3; .375 INJECTION, POWDER, LYOPHILIZED, FOR SOLUTION INTRAVENOUS at 17:20

## 2024-01-01 RX ADMIN — FINASTERIDE 5 MG: 5 TABLET, FILM COATED ORAL at 08:52

## 2024-01-01 RX ADMIN — Medication 1 CAPSULE: at 09:06

## 2024-01-01 RX ADMIN — OXYBUTYNIN CHLORIDE 5 MG: 5 TABLET ORAL at 20:04

## 2024-01-01 RX ADMIN — IOPAMIDOL 75 ML: 755 INJECTION, SOLUTION INTRAVENOUS at 11:18

## 2024-01-01 RX ADMIN — SODIUM CHLORIDE 1 MG: 9 INJECTION INTRAMUSCULAR; INTRAVENOUS; SUBCUTANEOUS at 15:26

## 2024-01-01 RX ADMIN — PANTOPRAZOLE SODIUM 40 MG: 40 INJECTION, POWDER, FOR SOLUTION INTRAVENOUS at 09:03

## 2024-01-01 RX ADMIN — PROPOFOL 20 MCG/KG/MIN: 10 INJECTION, EMULSION INTRAVENOUS at 20:35

## 2024-01-01 RX ADMIN — HYDROMORPHONE HYDROCHLORIDE 0.5 MG: 2 INJECTION, SOLUTION INTRAMUSCULAR; INTRAVENOUS; SUBCUTANEOUS at 15:39

## 2024-01-01 RX ADMIN — ASPIRIN 81 MG: 81 TABLET, COATED ORAL at 09:05

## 2024-01-01 RX ADMIN — AMIODARONE HYDROCHLORIDE 400 MG: 200 TABLET ORAL at 13:44

## 2024-01-01 RX ADMIN — DEXMEDETOMIDINE HYDROCHLORIDE 0.6 MCG/KG/HR: 400 INJECTION, SOLUTION INTRAVENOUS at 04:10

## 2024-01-01 RX ADMIN — SACUBITRIL AND VALSARTAN 0.5 TABLET: 24; 26 TABLET, FILM COATED ORAL at 20:29

## 2024-01-01 RX ADMIN — MEROPENEM 1000 MG: 1 INJECTION INTRAVENOUS at 17:51

## 2024-01-01 RX ADMIN — PIPERACILLIN AND TAZOBACTAM 3375 MG: 3; .375 INJECTION, POWDER, LYOPHILIZED, FOR SOLUTION INTRAVENOUS at 09:44

## 2024-01-01 RX ADMIN — FINASTERIDE 5 MG: 5 TABLET, FILM COATED ORAL at 08:42

## 2024-01-01 RX ADMIN — SODIUM CHLORIDE 1 MG: 9 INJECTION INTRAMUSCULAR; INTRAVENOUS; SUBCUTANEOUS at 04:35

## 2024-01-01 RX ADMIN — PANTOPRAZOLE SODIUM 40 MG: 40 INJECTION, POWDER, FOR SOLUTION INTRAVENOUS at 08:59

## 2024-01-01 RX ADMIN — Medication 1 CAPSULE: at 09:04

## 2024-01-01 RX ADMIN — SODIUM CHLORIDE, PRESERVATIVE FREE 10 ML: 5 INJECTION INTRAVENOUS at 20:31

## 2024-01-01 RX ADMIN — SACUBITRIL AND VALSARTAN 0.5 TABLET: 24; 26 TABLET, FILM COATED ORAL at 20:23

## 2024-01-01 RX ADMIN — OXYBUTYNIN CHLORIDE 5 MG: 5 TABLET ORAL at 20:52

## 2024-01-01 RX ADMIN — Medication 1 CAPSULE: at 07:49

## 2024-01-01 RX ADMIN — POTASSIUM CHLORIDE 20 MEQ: 29.8 INJECTION, SOLUTION INTRAVENOUS at 03:51

## 2024-01-01 RX ADMIN — ACETAMINOPHEN 650 MG: 325 TABLET ORAL at 20:58

## 2024-01-01 RX ADMIN — SODIUM CHLORIDE, POTASSIUM CHLORIDE, SODIUM LACTATE AND CALCIUM CHLORIDE: 600; 310; 30; 20 INJECTION, SOLUTION INTRAVENOUS at 11:28

## 2024-01-01 RX ADMIN — PHENYLEPHRINE HYDROCHLORIDE 20 MCG/MIN: 50 INJECTION INTRAVENOUS at 15:33

## 2024-01-01 RX ADMIN — PIPERACILLIN AND TAZOBACTAM 3375 MG: 3; .375 INJECTION, POWDER, LYOPHILIZED, FOR SOLUTION INTRAVENOUS at 09:14

## 2024-01-01 RX ADMIN — POTASSIUM CHLORIDE 40 MEQ: 1500 TABLET, EXTENDED RELEASE ORAL at 12:33

## 2024-01-01 RX ADMIN — OXYBUTYNIN CHLORIDE 5 MG: 5 TABLET ORAL at 07:49

## 2024-01-01 ASSESSMENT — ENCOUNTER SYMPTOMS
CONSTIPATION: 0
ABDOMINAL PAIN: 0
SHORTNESS OF BREATH: 0
EYE REDNESS: 0
DIARRHEA: 0
BACK PAIN: 0
WHEEZING: 0
NAUSEA: 0
SORE THROAT: 0
EYE DISCHARGE: 0
SINUS PRESSURE: 0
RHINORRHEA: 0
SINUS PAIN: 0
COUGH: 0

## 2024-01-01 ASSESSMENT — PAIN SCALES - GENERAL
PAINLEVEL_OUTOF10: 3
PAINLEVEL_OUTOF10: 0
PAINLEVEL_OUTOF10: 4
PAINLEVEL_OUTOF10: 0
PAINLEVEL_OUTOF10: 0
PAINLEVEL_OUTOF10: 4
PAINLEVEL_OUTOF10: 0
PAINLEVEL_OUTOF10: 2
PAINLEVEL_OUTOF10: 0
PAINLEVEL_OUTOF10: 4
PAINLEVEL_OUTOF10: 0
PAINLEVEL_OUTOF10: 0
PAINLEVEL_OUTOF10: 6
PAINLEVEL_OUTOF10: 0
PAINLEVEL_OUTOF10: 7
PAINLEVEL_OUTOF10: 4
PAINLEVEL_OUTOF10: 0
PAINLEVEL_OUTOF10: 10
PAINLEVEL_OUTOF10: 0
PAINLEVEL_OUTOF10: 8
PAINLEVEL_OUTOF10: 0
PAINLEVEL_OUTOF10: 4
PAINLEVEL_OUTOF10: 6
PAINLEVEL_OUTOF10: 0
PAINLEVEL_OUTOF10: 3
PAINLEVEL_OUTOF10: 0
PAINLEVEL_OUTOF10: 9
PAINLEVEL_OUTOF10: 0
PAINLEVEL_OUTOF10: 2

## 2024-01-01 ASSESSMENT — PULMONARY FUNCTION TESTS
PIF_VALUE: 15
PIF_VALUE: 18
PIF_VALUE: 15
PIF_VALUE: 20
PIF_VALUE: 16

## 2024-01-01 ASSESSMENT — PAIN - FUNCTIONAL ASSESSMENT
PAIN_FUNCTIONAL_ASSESSMENT: 0-10
PAIN_FUNCTIONAL_ASSESSMENT: ACTIVITIES ARE NOT PREVENTED
PAIN_FUNCTIONAL_ASSESSMENT: ACTIVITIES ARE NOT PREVENTED
PAIN_FUNCTIONAL_ASSESSMENT: PREVENTS OR INTERFERES SOME ACTIVE ACTIVITIES AND ADLS

## 2024-01-01 ASSESSMENT — PAIN DESCRIPTION - ORIENTATION
ORIENTATION: RIGHT;UPPER
ORIENTATION: RIGHT
ORIENTATION: MID
ORIENTATION: RIGHT;UPPER

## 2024-01-01 ASSESSMENT — PAIN DESCRIPTION - LOCATION
LOCATION: ABDOMEN
LOCATION: ABDOMEN
LOCATION: PENIS
LOCATION: RIB CAGE
LOCATION: ABDOMEN
LOCATION: THROAT
LOCATION: ABDOMEN
LOCATION: RIB CAGE

## 2024-01-01 ASSESSMENT — PAIN SCALES - WONG BAKER: WONGBAKER_NUMERICALRESPONSE: NO HURT

## 2024-01-01 ASSESSMENT — PAIN DESCRIPTION - DESCRIPTORS
DESCRIPTORS: ACHING;THROBBING;DISCOMFORT;CRAMPING
DESCRIPTORS: JABBING;STABBING
DESCRIPTORS: BURNING;DISCOMFORT
DESCRIPTORS: DISCOMFORT
DESCRIPTORS: DISCOMFORT;CRAMPING;SHARP;STABBING

## 2024-01-01 ASSESSMENT — PAIN DESCRIPTION - PAIN TYPE: TYPE: ACUTE PAIN

## 2024-02-14 DIAGNOSIS — E78.2 MIXED HYPERLIPIDEMIA: ICD-10-CM

## 2024-02-14 RX ORDER — LOVASTATIN 20 MG/1
TABLET ORAL
Qty: 90 TABLET | Refills: 0 | Status: SHIPPED | OUTPATIENT
Start: 2024-02-14

## 2024-02-26 ENCOUNTER — TELEPHONE (OUTPATIENT)
Dept: PHARMACY | Facility: CLINIC | Age: 84
End: 2024-02-26

## 2024-02-26 NOTE — TELEPHONE ENCOUNTER
[facility] CLINICAL PHARMACY: ADHERENCE REVIEW  Identified care gap per [employer]: fills at [facility]: Diabetes adherence    Patient also appears to be prescribed: Statin    ASSESSMENT  DIABETES ADHERENCE  *NOTE: rx from cardiology (not DM rx)    Insurance Records claims through 24 (Prior Year PDC = not reported; YTD PDC = FIRST FILL; Potential Fail Date: 24):   FARXIGA      TAB 10MG last filled on 24 for 30 day supply. Next refill due: 24    Prescribed si tablet/capsule daily    Per CareEverywhere, appears received 3 weeks of samples @24 (Samples of this drug were given to the patient, Quantity 21, Lot Number [id number], Expiration 26)    No results found for: \"LABA1C\"    STATIN ADHERENCE    Insurance Records claims through 24 (Prior Year PDC = not reported; YTD PDC = FIRST FILL; Potential Fail Date: 24):   LOVASTATIN   TAB 20MG last filled on 24 for 90 day supply. Next refill due: 24    Prescribed si tablet/capsule daily    Per chart, 0 refills remain (has next PCP appt prior to refill due)    Lab Results   Component Value Date    CHOL 139 2023    TRIG 141 2023    HDL 45 2023    LDLCALC 69 2023     ALT   Date Value Ref Range Status   2023 11 0 - 44 IU/L Final     AST   Date Value Ref Range Status   2023 14 0 - 40 IU/L Final     The ASCVD Risk score (Tesfaye DK, et al., 2019) failed to calculate for the following reasons:    The 2019 ASCVD risk score is only valid for ages 40 to 79    The patient has a prior MI or stroke diagnosis     PLAN  The following are interventions that have been identified:   Patient overdue refilling Farxiga 10mg daily (appears may have received some samples from cardiology office?) and active on home medication list.   If cost concerns, can check into: "[employer] Patient Assistance Program - [place] Prescription Savings: If you are eligible, your medication will be sent to you at no

## 2024-03-24 NOTE — PROGRESS NOTES
)  Have you had an eye exam within the past year?: Yes  No results found.    Interventions:   Patient encouraged to make appointment with their eye specialist                    Objective   Vitals:    03/29/24 1013   BP: 118/80   Pulse: 58   Temp: 97.3 °F (36.3 °C)   SpO2: 97%   Weight: 77.6 kg (171 lb)   Height: 1.727 m (5' 8\")      Body mass index is 26 kg/m².             Allergies   Allergen Reactions    Antihistamine [Diphenhydramine Hcl]      Unable to urinate due to enlarged prostate    Antihistamines, Chlorpheniramine-Type     Aspirin Other (See Comments)     325mg only.  Hematuria.     Diphenhydramine      Prior to Visit Medications    Medication Sig Taking? Authorizing Provider   dapagliflozin (FARXIGA) 10 MG tablet Take 1 tablet by mouth every morning Yes Robin Bhandari MD   lovastatin (MEVACOR) 20 MG tablet TAKE ONE TABLET BY MOUTH ONCE NIGHTLY Yes Raphael Huntley Jr., MD   amitriptyline (ELAVIL) 50 MG tablet TAKE ONE TABLET BY MOUTH ONCE NIGHTLY Yes Raphael Huntley Jr., MD   apixaban (ELIQUIS) 5 MG TABS tablet Take by mouth 2 times daily Yes Robin Bhandari MD   betamethasone valerate (VALISONE) 0.1 % cream APPLY TOPICALLY TWO TIMES A DAY  Patient taking differently: as needed As needed Yes Raphael Huntley Jr., MD   metoprolol succinate (TOPROL XL) 25 MG extended release tablet TAKE ONE TABLET BY MOUTH DAILY Yes Raphael Huntley Jr., MD   Netarsudil-Latanoprost (ROCKLATAN) 0.02-0.005 % SOLN Place 1 drop into both eyes daily Yes Robin Bhandari MD   tamsulosin (FLOMAX) 0.4 MG capsule Take 1 capsule by mouth daily Yes Robin Bhandari MD   MULTIPLE VITAMIN PO Take 1 tablet by mouth daily  Yes Robin Bhandari MD   Cholecalciferol (VITAMIN D3) 1.25 MG (95236 UT) TABS Take 1 capsule by mouth daily  Yes Robin Bhandari MD   polyethylene glycol (GLYCOLAX) packet Take 1 packet by mouth every 48 hours as needed Yes Robin Bhandari MD   aspirin 81 MG

## 2024-03-29 ENCOUNTER — OFFICE VISIT (OUTPATIENT)
Dept: FAMILY MEDICINE CLINIC | Age: 84
End: 2024-03-29
Payer: MEDICARE

## 2024-03-29 VITALS
BODY MASS INDEX: 25.91 KG/M2 | TEMPERATURE: 97.3 F | HEART RATE: 58 BPM | HEIGHT: 68 IN | DIASTOLIC BLOOD PRESSURE: 80 MMHG | WEIGHT: 171 LBS | OXYGEN SATURATION: 97 % | SYSTOLIC BLOOD PRESSURE: 118 MMHG

## 2024-03-29 DIAGNOSIS — Z00.00 MEDICARE ANNUAL WELLNESS VISIT, SUBSEQUENT: Primary | ICD-10-CM

## 2024-03-29 DIAGNOSIS — Z12.5 SCREENING FOR MALIGNANT NEOPLASM OF PROSTATE: ICD-10-CM

## 2024-03-29 DIAGNOSIS — C80.1 CANCER (HCC): ICD-10-CM

## 2024-03-29 DIAGNOSIS — Z86.73 HISTORY OF STROKE: ICD-10-CM

## 2024-03-29 DIAGNOSIS — I42.9 CARDIOMYOPATHY, UNSPECIFIED TYPE (HCC): ICD-10-CM

## 2024-03-29 DIAGNOSIS — E78.2 MIXED HYPERLIPIDEMIA: ICD-10-CM

## 2024-03-29 DIAGNOSIS — N18.31 STAGE 3A CHRONIC KIDNEY DISEASE (HCC): ICD-10-CM

## 2024-03-29 PROCEDURE — 3074F SYST BP LT 130 MM HG: CPT | Performed by: FAMILY MEDICINE

## 2024-03-29 PROCEDURE — 1123F ACP DISCUSS/DSCN MKR DOCD: CPT | Performed by: FAMILY MEDICINE

## 2024-03-29 PROCEDURE — G0439 PPPS, SUBSEQ VISIT: HCPCS | Performed by: FAMILY MEDICINE

## 2024-03-29 PROCEDURE — 3079F DIAST BP 80-89 MM HG: CPT | Performed by: FAMILY MEDICINE

## 2024-03-29 PROCEDURE — 99214 OFFICE O/P EST MOD 30 MIN: CPT | Performed by: FAMILY MEDICINE

## 2024-03-29 RX ORDER — DAPAGLIFLOZIN 10 MG/1
10 TABLET, FILM COATED ORAL EVERY MORNING
COMMUNITY
Start: 2024-01-19

## 2024-03-29 SDOH — ECONOMIC STABILITY: INCOME INSECURITY: HOW HARD IS IT FOR YOU TO PAY FOR THE VERY BASICS LIKE FOOD, HOUSING, MEDICAL CARE, AND HEATING?: NOT HARD AT ALL

## 2024-03-29 SDOH — ECONOMIC STABILITY: FOOD INSECURITY: WITHIN THE PAST 12 MONTHS, YOU WORRIED THAT YOUR FOOD WOULD RUN OUT BEFORE YOU GOT MONEY TO BUY MORE.: NEVER TRUE

## 2024-03-29 SDOH — ECONOMIC STABILITY: FOOD INSECURITY: WITHIN THE PAST 12 MONTHS, THE FOOD YOU BOUGHT JUST DIDN'T LAST AND YOU DIDN'T HAVE MONEY TO GET MORE.: NEVER TRUE

## 2024-03-29 ASSESSMENT — PATIENT HEALTH QUESTIONNAIRE - PHQ9
SUM OF ALL RESPONSES TO PHQ9 QUESTIONS 1 & 2: 0
SUM OF ALL RESPONSES TO PHQ QUESTIONS 1-9: 0
2. FEELING DOWN, DEPRESSED OR HOPELESS: NOT AT ALL
1. LITTLE INTEREST OR PLEASURE IN DOING THINGS: NOT AT ALL
SUM OF ALL RESPONSES TO PHQ QUESTIONS 1-9: 0

## 2024-03-29 ASSESSMENT — LIFESTYLE VARIABLES
HOW OFTEN DO YOU HAVE A DRINK CONTAINING ALCOHOL: NEVER
HOW MANY STANDARD DRINKS CONTAINING ALCOHOL DO YOU HAVE ON A TYPICAL DAY: PATIENT DOES NOT DRINK

## 2024-03-29 ASSESSMENT — ENCOUNTER SYMPTOMS
DIARRHEA: 0
BACK PAIN: 0
RHINORRHEA: 1
CONSTIPATION: 0
SHORTNESS OF BREATH: 1

## 2024-03-29 NOTE — PATIENT INSTRUCTIONS
3/29/2024  Medicare offers a range of preventive health benefits. Some of the tests and screenings are paid in full while other may be subject to a deductible, co-insurance, and/or copay.    Some of these benefits include a comprehensive review of your medical history including lifestyle, illnesses that may run in your family, and various assessments and screenings as appropriate.    After reviewing your medical record and screening and assessments performed today your provider may have ordered immunizations, labs, imaging, and/or referrals for you.  A list of these orders (if applicable) as well as your Preventive Care list are included within your After Visit Summary for your review.    Other Preventive Recommendations:    A preventive eye exam performed by an eye specialist is recommended every 1-2 years to screen for glaucoma; cataracts, macular degeneration, and other eye disorders.  A preventive dental visit is recommended every 6 months.  Try to get at least 150 minutes of exercise per week or 10,000 steps per day on a pedometer .  Order or download the FREE \"Exercise & Physical Activity: Your Everyday Guide\" from [employer]. Call 1-637.407.1997 or search [employer] [employer] online.  You need 1206-2150 mg of calcium and 4086-6529 IU of vitamin D per day. It is possible to meet your calcium requirement with diet alone, but a vitamin D supplement is usually necessary to meet this goal.  When exposed to the sun, use a sunscreen that protects against both UVA and UVB radiation with an SPF of 30 or greater. Reapply every 2 to 3 hours or after sweating, drying off with a towel, or swimming.  Always wear a seat belt when traveling in a car. Always wear a helmet when riding a bicycle or motorcycle.

## 2024-04-03 DIAGNOSIS — I48.4 ATYPICAL ATRIAL FLUTTER (HCC): Primary | ICD-10-CM

## 2024-05-04 ENCOUNTER — APPOINTMENT (OUTPATIENT)
Dept: GENERAL RADIOLOGY | Age: 84
End: 2024-05-04
Payer: MEDICARE

## 2024-05-04 ENCOUNTER — HOSPITAL ENCOUNTER (EMERGENCY)
Age: 84
Discharge: HOME | End: 2024-05-04
Payer: MEDICARE

## 2024-05-04 VITALS
OXYGEN SATURATION: 93 % | DIASTOLIC BLOOD PRESSURE: 77 MMHG | SYSTOLIC BLOOD PRESSURE: 125 MMHG | RESPIRATION RATE: 18 BRPM | HEART RATE: 100 BPM | TEMPERATURE: 98.1 F

## 2024-05-04 DIAGNOSIS — S80.11XA HEMATOMA OF LEG, RIGHT, INITIAL ENCOUNTER: Primary | ICD-10-CM

## 2024-05-04 PROCEDURE — 6370000000 HC RX 637 (ALT 250 FOR IP): Performed by: PHYSICIAN ASSISTANT

## 2024-05-04 PROCEDURE — 73590 X-RAY EXAM OF LOWER LEG: CPT

## 2024-05-04 PROCEDURE — 99283 EMERGENCY DEPT VISIT LOW MDM: CPT

## 2024-05-04 RX ORDER — ACETAMINOPHEN 500 MG
1000 TABLET ORAL ONCE
Status: COMPLETED | OUTPATIENT
Start: 2024-05-04 | End: 2024-05-04

## 2024-05-04 RX ADMIN — ACETAMINOPHEN 1000 MG: 500 TABLET ORAL at 20:11

## 2024-05-04 ASSESSMENT — PAIN SCALES - GENERAL: PAINLEVEL_OUTOF10: 6

## 2024-05-04 ASSESSMENT — PAIN - FUNCTIONAL ASSESSMENT: PAIN_FUNCTIONAL_ASSESSMENT: 0-10

## 2024-05-05 NOTE — ED PROVIDER NOTES
[facility] EMERGENCY DEPARTMENT  EMERGENCY DEPARTMENT ENCOUNTER        Pt Name: Jonathan Dove  MRN: [id number]  Birthdate 1940  Date of evaluation: 5/4/2024  Provider: Elvi Blanco PA-C  PCP: Raphael Huntley Jr., MD  Note Started: 3:38 AM EDT 5/5/24      ANITRA. I have evaluated this patient.        CHIEF COMPLAINT       Chief Complaint   Patient presents with    Fall     PT came in from home, pt reports tripping and falling on Monday and bumped leg, pt reports leg is now more swollen and discolored, pt takes elequis       HISTORY OF PRESENT ILLNESS: 1 or more Elements     History From: Patient  Limitations to history : None    Jonathan Dove is a 83 y.o. male who presents to the emergency department today for evaluation for concerns of a hematoma noted to his right lower leg.  The patient reports that on Monday, he states that he bumped his mid shin on an object.  He states that since that he has noted some swelling, bruising, and pain.  The patient is anticoagulated on Eliquis secondary to history of atrial flutter.  Patient states that he continues to have pain, and bruising, which prompted his visit to the ED.  The patient is currently rating his pain as a 6/10, pain is worse with touch and certain movements.  He denies any drainage from the area.  He has no fever or chills.  No nausea or vomiting.  He has no numbness, ting or weakness.  He still able to ambulate although does experience pain    Nursing Notes were all reviewed and agreed with or any disagreements were addressed in the HPI.    REVIEW OF SYSTEMS :      Review of Systems   Constitutional:  Negative for activity change, appetite change, chills and fever.   HENT:  Negative for congestion and rhinorrhea.    Respiratory:  Negative for cough and shortness of breath.    Cardiovascular:  Negative for chest pain.   Gastrointestinal:  Negative for abdominal pain, diarrhea, nausea and vomiting.   Genitourinary:  Negative for

## 2024-05-10 DIAGNOSIS — E78.2 MIXED HYPERLIPIDEMIA: ICD-10-CM

## 2024-05-10 RX ORDER — LOVASTATIN 20 MG/1
TABLET ORAL
Qty: 90 TABLET | Refills: 0 | Status: SHIPPED | OUTPATIENT
Start: 2024-05-10

## 2024-08-09 DIAGNOSIS — E78.2 MIXED HYPERLIPIDEMIA: ICD-10-CM

## 2024-08-12 RX ORDER — LOVASTATIN 20 MG/1
TABLET ORAL
Qty: 90 TABLET | Refills: 0 | Status: SHIPPED | OUTPATIENT
Start: 2024-08-12

## 2024-08-12 NOTE — TELEPHONE ENCOUNTER
Medication:   Requested Prescriptions     Pending Prescriptions Disp Refills    lovastatin (MEVACOR) 20 MG tablet [Pharmacy Med Name: LOVASTATIN 20 MG TABLET] 90 tablet 0     Sig: TAKE ONE TABLET BY MOUTH ONCE NIGHTLY      Provider out of office.     Patient Phone Number: [phone] (home)     Last appt: 3/29/2024   Next appt: 10/3/2024    Last OARRS:        No data to display              PDMP Monitoring:    Last PDMP Isaias as Reviewed ([place]):  Review User Review Instant Review Result          Preferred Pharmacy:   [facility] [id number] - [place] - [address] [phone] - F [phone]  [address] [place] [address]  Phone: [phone] Fax: [phone]

## 2024-09-24 PROBLEM — I47.20 V TACH (HCC): Status: ACTIVE | Noted: 2024-01-01

## 2024-09-24 NOTE — ED NOTES
20 mg Etomidate and 75 mg Rocuronium given IV by Tea BAHENA per verbal order from Dr. Reich for RSI

## 2024-09-25 PROBLEM — Z99.11 ON MECHANICALLY ASSISTED VENTILATION (HCC): Status: ACTIVE | Noted: 2024-09-25

## 2024-09-25 PROBLEM — R40.0 SOMNOLENCE: Status: ACTIVE | Noted: 2024-01-01

## 2024-09-25 PROBLEM — J96.01 ACUTE RESPIRATORY FAILURE WITH HYPOXIA: Status: ACTIVE | Noted: 2024-09-25

## 2024-09-25 PROBLEM — I50.23 ACUTE ON CHRONIC SYSTOLIC HEART FAILURE (HCC): Status: ACTIVE | Noted: 2024-09-25

## 2024-09-25 PROBLEM — I48.92 CHRONIC ATRIAL FLUTTER (HCC): Status: ACTIVE | Noted: 2024-01-01

## 2024-09-25 PROBLEM — I50.22 CHRONIC SYSTOLIC CONGESTIVE HEART FAILURE (HCC): Status: ACTIVE | Noted: 2024-09-25

## 2024-09-25 NOTE — PROGRESS NOTES
Weaning parameters initiated RSBI of 37 and leak test of 100% with audible sound around deflated cuff. Pt extubated to 4LNC 100%. PT tolerating well at this time. Will continue to monitor.

## 2024-09-25 NOTE — PROGRESS NOTES
Hospitalist Progress Note    Name:  Jonathan Dove    /Age/Sex: 1940  (84 y.o. male)  MRN & CSN:  [id number] & [id number]    PCP: Raphael Huntley Jr., MD    Date of Admission: 2024    Patient Status:  Inpatient     Chief Complaint:   Chief Complaint   Patient presents with    Irregular Heart Beat     Pt from home via [facility] for unstable V-tach, per EMS pt was in vtach but was alert and oriented with SBP in 80s. EMS gave 5 mg versed en route and cardioverted to NSR with PVCs, pt being bagged upon arrival, pulse still present       Hospital Course:   Patient admitted for V tach. Was shocked 1x on admission. Was intubated for cardioversion. Cardiology consulted. CCM consulted.    Started on amiodarone drip by cardiology.     Subjective:  Today is:  Hospital Day: 2.  Patient seen and examined in CVU-/2912.     Intubated. Sedated. Non responsive.      Medications:  Reviewed    Infusion Medications    fentaNYL 10 mcg/hr (24 0752)    phenylephrine (ARACELI-SYNEPHRINE) 50 mg in sodium chloride 0.9 % 250 mL infusion 2 mcg/min (24 0752)    lactated ringers IV soln 100 mL/hr at 24 1550    propofol Stopped (24 1050)    amiodarone (CORDARONE) 450 mg in dextrose 5 % 250 mL infusion 1 mg/min (24 0752)    sodium chloride       Scheduled Medications    pantoprazole  40 mg IntraVENous Daily    aspirin  81 mg Oral Daily    apixaban  5 mg Oral BID    sodium chloride flush  5-40 mL IntraVENous 2 times per day    nitroglycerin  0.5 inch Topical Q4H     PRN Meds: potassium chloride, sodium chloride flush, sodium chloride, potassium chloride **OR** potassium alternative oral replacement **OR** potassium chloride, magnesium sulfate, ondansetron **OR** ondansetron, polyethylene glycol, acetaminophen **OR** acetaminophen      Intake/Output Summary (Last 24 hours) at 2024 1624  Last data filed at 2024 1500  Gross per 24 hour   Intake 930.85 ml   Output 725 ml   Net  No pneumothorax or new airspace disease   identified.         CT HEAD WO CONTRAST   Final Result   No acute intracranial abnormality.      Mild generalized cerebral volume loss and moderate chronic microvascular   ischemic changes of the periventricular and subcortical white matter.         XR CHEST PORTABLE   Final Result   1. Endotracheal tube is noted with its tip approximately 3.8 cm from the   yunior.   2. Small left pleural effusion and/or basilar atelectasis.                 Assessment/Plan:    Active Hospital Problems    Diagnosis     On mechanically assisted ventilation ([facility]) [Z99.11]     Chronic atrial flutter (HCC) [I48.92]     Acute on chronic systolic heart failure (HCC) [I50.23]     V tach (HCC) [I47.20]     CKD (chronic kidney disease), stage III ([facility]) [N18.30]     Essential hypertension [I10]     CAD (coronary artery disease) [I25.10]     Mixed hyperlipidemia [E78.2]          Hospital Day: 2    This is a 84 y.o. male who presented to [facility] on [facility]/24/2024 and is being treated for:    Vtach  -Cardioversion x1 in ED  -If pressors are needed, use flako  -Will likely need pacemaker/defibrillator placed - defer to EP  -Daily labs; replace electrolytes as needed  -Cardiology consulted    On mechanical ventilation  -On vent from cardioversion  -Wean sedation as able and hopeful for extubation today  -Monitor O2 sat  -[facility] consulted    HFrEF - new issue  -Probably 2/2 above  -Echo 9/25/24 showed LVEF 30-35%  -Monitor EF over next few days  -I/Os    CKD III  -Creatinine 1.6 on admission; baseline approximately 1.3-1.7  -Avoid nephrotoxins  -Daily labs; trend creatinine    Chronic atrial flutter  -Transition from heparin drip to eliquis when extubated  -Defer amiodarone drip to cardiology    Hypertension  -Continue home antihypertensives    Hyperlipidemia  -Continue home statin      Discussed management of the case with cardiology who recommended defibrillator placement    Drugs that require monitoring for

## 2024-09-25 NOTE — SEDATION DOCUMENTATION
[facility]  Pre-sedation Assessment   PROCEDURE   Planned Procedure Cardiac catheterization   Consent I have discussed with the patient and/or the patient representative the indication, alternatives, and the possible risks and/or complications of the planned procedure and the anesthesia methods. The patient and/or patient representative appear to understand and agree to proceed.   INDICATION   Chief Complaint VT   Presentation Cardiac Arrythmia   Anginal Class (2 wks) CCS III - Symptoms with everyday living activities, i.e., moderate limitation   Anginal Symptoms Typical chest pain or anginal equivalent   CHF Class (2 wks) [] I     []II     []III     []IV    CV Instability Yes   ISCHEMIC WORKUP/MANAGEMENT/EVALUTION   Stress Test No   Anginal Meds Yes: ACE/ARB/ARNI, Aspirin, Beta Blockers, and STATIN   UPCOMING SURGERY   Valve surgery No   MEDICAL HISTORY   Vital Signs /68   Pulse 96   Resp 18   Ht 1.727 m (5' 8\")   Wt 77.6 kg (171 lb)   SpO2 100%   BMI 26.00 kg/m²    Allergies Reviewed in EMR   Medications Reviewed in EMR   Medical History Reviewed in EMR   Surgical History Reviewed in EMR   PRE-SEDATION   Exam I have assessed the patient and reviewed the H&P on the chart.   Hx Anesthesia Issue None   Mod Mallampati II   ASA Class Class 4 - A patient with an incapacitating systemic disease that is a constant threat to life   CHEL SCALE   Activity 2 - Able to move 4 extrem on command   Respiration 2 - Able to breath deeply and cough freely   Circulation 2 - BP +/- 20mmHg of normal   Consciousness 2 - Fully awake   Oxygen Saturation 2 - Able to maintain oxygen sat >92% on room air   SEDATION/ANESTHESIA PLAN   Goal Guard patient safety and welfare. Minimize physical discomfort and pain. Minimize negative psychological responses to treatment by providing sedation and analgesia and maximize the potential amnesia.  Patient to meet pre-procedure discharge plan.   Medications Midazolam

## 2024-09-25 NOTE — H&P
V2.0  History and Physical      Name:  Jonathan Dove /Age/Sex: 1940  (84 y.o. male)   MRN & CSN:  [id number] & [id number] Encounter Date/Time: 2024 9:40 PM EDT   Location:  [facility] PCP: Raphael Huntley Jr., MD       Hospital Day: 2    Assessment and Plan:   Jonathan Dove is a 84 y.o. male with a pmh of CHF, nonischemic cardiomyopathy, status post mitral valve replacement with bioprosthetic valve, status post aortic valve replacement with bioprosthetic valve, CAD status post CABG who presents with V tach ([facility])    Hospital Problems             Last Modified POA    * (Principal) V tach ([facility]) 2024 Yes       Plan:  # V. Tach:  -Patient presented with chief complaint of V. tach.  -Per note, EKG was done by EMS and was V. tach.  Patient was given 5 mg of Versed and cardioverted him.  -Patient was intubated in the ED for airway protection.  -Cardiology was consulted and patient underwent left heart cath with clean coronaries.  -Continue amnio drip per cardiology recommendation.  -EP consult    # ONEIL:  -Most likely prerenal  -Gentle IV fluid  -Monitor creatinine  -Avoid nephrotoxic meds    # Need for mechanical ventilation:  -Patient was intubated for airway protection in the ED.  -Chest x-ray was done and showed Small left pleural effusion and/or basilar atelectasis.   -Critical care consult    # A-flutter:  -Holding Eliquis  -Continue heparin  -On amnio drip    # CHF:  -Not in acute exacerbation  -Strict in and out  -Daily weight      Disposition:   Current Living situation: Home  Expected Disposition: Home  Estimated D/C: 3 days    Diet Diet NPO   DVT Prophylaxis [] Lovenox, [x]  Heparin, [] SCDs, [] Ambulation,  [] Eliquis, [] Xarelto, [] Coumadin   Code Status Full Code   Surrogate Decision Maker/ POA Daughter     Personally reviewed Lab Studies and Imaging     Discussed management of the case with ED attending who recommended to admit patient for V. tach.    EKG interpreted

## 2024-09-25 NOTE — CONSULTS
joint swelling, deformity or tenderness  Neurologic: alert, no focal neurologic deficits    Data Review:  CBC:   Lab Results   Component Value Date/Time    WBC 14.5 09/25/2024 07:43 AM    RBC 4.65 09/25/2024 07:43 AM     BMP:   Lab Results   Component Value Date/Time    GLUCOSE 145 09/25/2024 05:20 AM    CO2 21 09/25/2024 05:20 AM    BUN 18 09/25/2024 05:20 AM    CREATININE 1.3 09/25/2024 05:20 AM    CALCIUM 8.1 09/25/2024 05:20 AM     ABG:   Lab Results   Component Value Date/Time    ZBD3RKO 20.8 09/25/2024 05:20 AM    BEART -4.0 09/25/2024 05:20 AM    J8ETJOHG 98.5 09/25/2024 05:20 AM    PHART 7.365 09/25/2024 05:20 AM    XHZ5QUX 36.4 09/25/2024 05:20 AM    PO2ART 107.0 09/25/2024 05:20 AM    EOL6GHZ 49.1 09/25/2024 05:20 AM       Radiology: All pertinent images / reports were reviewed as a part of this visit.    EXAMINATION:  ONE XRAY VIEW OF THE CHEST     9/25/2024 2:05 am     COMPARISON:  09/24/2024.     HISTORY:  ORDERING SYSTEM PROVIDED HISTORY: CVC placement  TECHNOLOGIST PROVIDED HISTORY:  Reason for exam:->CVC placement  Reason for Exam: CVC placement     FINDINGS:  New right internal jugular line courses in the right paratracheal  mediastinum, presumably at the level of the mid SVC.  Endotracheal tube  remains in place.The cardiac and mediastinal contours appear unchanged. Left  basilar opacity and probable small effusion again demonstrated.  No new  airspace disease identified in the interval.  No evidence for pneumothorax.     IMPRESSION:  New right central line in place.  No pneumothorax or new airspace disease  identified.    Problem List:   Suspected brought complex tachycardia/V. tach requiring defibrillation severe nonischemic cardiomyopathy, EF currently 30 to 35%  Acute hypoxic respiratory failure  Altered mental status    Assessment/Plan:     Acute hypoxic respiratory failure, required intubation following sedation for airway protection following defibrillation.  40%/PEEP of 5.  No acute  infiltrates noted on chest x-ray concerning for aspiration.  Plan for sedation wean and potential extubation if patient is able to pass spontaneous breathing trial.    Complex cardiac history with AVR/MVR and nonischemic cardiomyopathy, EF 30 to 35%.  LHC was performed today, showed nonobstructive coronary anatomy and did not require intervention.  Mild hypotension requiring phenylephrine, most likely from sedation.  Frequent ventricular ectopies, no witnessed V. tach episodes since hospitalization-continues on IV amiodarone drip.  Potential plans for AICD electively prior to discharge, if patient is agreeable.  Patient continues on Eliquis for history of paroxysmal A-fib.    Altered mental status, could have been related to sedation/Ativan use in the field.  CT head negative for acute intracranial pathology.  Patient appears to be more alert and following simple commands, wean off sedation completely.    Discussed plan with patient's daughter.  Critical care team will follow    Kaushal Cortes MD    Critical care time 32 Min excluding procedures

## 2024-09-25 NOTE — PROGRESS NOTES
[facility]   Daily Progress Note      Admit Date:  9/24/2024    HPI:    Mr. Dove is an 84 year old male with history of hyperlipidemia, status post CABG AVR and MVR/MALLORY clip in 1/2020, declined ablation for NSVT, atrial flutter on eliquis.  He follows with Dr Chen at [facility]     He was brought to the ER after being found to be in VT.  BP in the 80s, given versed and shocked, intubated and sedated in the ED.  [facility] no intervention.  It has been discussed in the past about ICD but patient had declined      Subjective:  Patient is being seen for acute on chronic systolic heart failure. There were no acute overnight cardiac events.  He is intubated and on the ventilator.  His daughter and son are at his side.  They just turned off his sedation and he is starting to awaken.  He is on amiodarone and araceli drips.    Creat 1.3 bnp 3901    Objective:   /61   Pulse 66   Temp 97.4 °F (36.3 °C) (Bladder)   Resp 19   Ht 1.727 m (5' 8\")   Wt 73.1 kg (161 lb 2.5 oz)   SpO2 100%   BMI 24.50 kg/m²     Intake/Output Summary (Last 24 hours) at 9/25/2024 0905  Last data filed at 9/25/2024 0752  Gross per 24 hour   Intake 930.85 ml   Output 370 ml   Net 560.85 ml          Physical Exam:  General:  intubated  Skin:  Warm and dry.  No unusual bruising or rash  Neck:  Supple.  No JVD or carotid bruit appreciated  Chest:  Normal effort.  Clear to auscultation, no wheezes/rhonchi/rales  Cardiovascular:  RRR, S1/S2, no murmur/gallop/rub  Abdomen:  Soft, nontender, +bowel sounds  Extremities:  No edema  Neurological: intubated  Psychological: intubated      Medications:    heparin (porcine)  80 Units/kg IntraVENous Once    pantoprazole  40 mg IntraVENous Daily    sodium chloride flush  5-40 mL IntraVENous 2 times per day    nitroglycerin  0.5 inch Topical Q4H      fentaNYL 10 mcg/hr (09/25/24 0752)    phenylephrine (ARACELI-SYNEPHRINE) 50 mg in sodium chloride 0.9 % 250 mL infusion 2 mcg/min (09/25/24 0752)

## 2024-09-25 NOTE — CARE COORDINATION
Chart reviewed for discharge planning.    Pt from home. Is currently intubated and sedated.    CM will follow for pt progress and plan.    Mana Lowe RN, BSN  [phone]

## 2024-09-25 NOTE — CONSULTS
[facility]  H+P  Consult  OP Visit  FU Visit   CC/HPI   CC New patient visit for VT.   HPI 84 y.o., male admitted after found down and to be in sustained VT at home.  EMS states talking upon arrival and initially refused transfer.  Monitor connected and found to be in sustained VT.  Shocked by squad into sinus with 1st AVB and RBBB.  Intubated and sedated for airway protection.  Labs with Hypokalemia 3.4, ARF 1.6, lactic acidosis 2.8, wbc 14.8, plt 101.  CT head pending.  Initial bp 83/51, now 118/79.  Rohit and amio started in ER.     Cardiac Hx AVR, MVR   CARDIAC TESTING   EKG Sinus 1st AVB, RBBB (No change from prior 2.24)   Troponin Lab Results   Component Value Date    TROPHS 20 09/24/2024      HISTORY/ALLERGY/ROS   MEDHx  has a past medical history of Acute, but ill-defined, cerebrovascular disease, Cancer (HCC), Degeneration of cervical intervertebral disc, Degeneration of lumbar or lumbosacral intervertebral disc, Enlarged prostate, Irritable bowel syndrome, Proteinuria, Pure hypercholesterolemia, and Unspecified cerebral artery occlusion with cerebral infarction.   SURGHx  has a past surgical history that includes back surgery; Cardiac catheterization; and Aortic mitral valve replacement.   SOCHx  reports that he has never smoked. He has never used smokeless tobacco. He reports that he does not drink alcohol and does not use drugs.   FAMHx family history includes Diabetes in his mother; Heart Disease in his mother; Stroke in his brother.   ALLERG Antihistamine [diphenhydramine hcl]; Antihistamines, chlorpheniramine-type; Aspirin; and Diphenhydramine   ROS Full ROS obtained and negative except as mentioned in HPI   MEDICATIONS   Medications reviewed in Epic.   PHYSICAL EXAM   Vitals BP 93/60   Pulse 95   Resp 18   Wt 77.6 kg (171 lb)   SpO2 100%   BMI 26.00 kg/m²    Gen I/s Heart  Irreg Irreg, 1/6   Head NC, AT, no abnorm Abd  Soft, NT, +BS, no mass, no OM   Eyes PER, conj/corn clear Ext  Ext

## 2024-09-25 NOTE — PROGRESS NOTES
Patient awake and following commands.  Patient placed on CPAP.  Respiratory called for weaning parameters.  Patient suctioned and extubated to 4 liters nasal cannula.  Patient tolerated well.  Oxygen saturation 100%.   Patient alert and oriented X 3.

## 2024-09-25 NOTE — CONSULTS
[facility]   Electrophysiology   Date: 9/25/2024  Reason for Consultation: VT   Consult Requesting Physician: Rangel Devries DO     Chief Complaint   Patient presents with    Irregular Heart Beat     Pt from home via [facility] for unstable V-tach, per EMS pt was in vtach but was alert and oriented with SBP in 80s. EMS gave 5 mg versed en route and cardioverted to NSR with PVCs, pt being bagged upon arrival, pulse still present       CC: VT   HPI: Jonathan Dove is a 84 y.o. male who has been brought to the hospital by EMS after he was found to have wide-complex tachycardia/VT and hypotension and had to be cardioverted en route to the hospital.  Patient has been intubated in the emergency room and has been admitted to the CVU.    Patient has a complex medical history significant for MVR (31 mm Magna Ease), AVR (25 mm Inspiris), and MALLORY clip in January 2020 at [facility]. At the time LHC with 50% LM/LAD disease which was not felt significant for CABG.   He also has history of PVC in the past treated with Amiodarone and declined ablation.   He has had Afib/flutter and cardioversion at [facility]. Saw EP at [facility] and declined ablation and treated with amiodarone and and had cardioversion.     His ejection fraction got worse over time to 25-30% and Entresto / Farxiga was added and he was scheduled for LHC at [facility].     AICD was discussed with him at [facility].     Patient is intubated unable to provide history. Family states that he was not feeling well for the past few weeks and he was scheduled for LHC.     He has undergone cardiac catheterization which showed non-obstructive CAD with LV dysfunction with no intervention.     Cardiology has been consulted.     Assessment:   VT  Severe LV dysfunction, non-ischemic cardiomyopathy   S/p MVR  S/p AVR   History of PAF   ONEIL     Plan:   Reported WCT/VT with hypotension requiring cardioversion by EMS.   Patient is on IV amiodarone.   He was on amiodarone    24 77.6 kg (171 lb)   23 74.3 kg (163 lb 12.8 oz)     Temp  Av.1 °F (36.7 °C)  Min: 97.4 °F (36.3 °C)  Max: 99.2 °F (37.3 °C)  Pulse  Av  Min: 61  Max: 110  BP  Min: 78/57  Max: 135/70  SpO2  Av.8 %  Min: 98 %  Max: 100 %  FiO2   Av %  Min: 40 %  Max: 100 %    Intake/Output Summary (Last 24 hours) at 2024 0849  Last data filed at 2024 0752  Gross per 24 hour   Intake 930.85 ml   Output 370 ml   Net 560.85 ml       Constitutional: intubated and sedated.   Cardiovascular: Normal rate, regular rhythm, S1&S2.    Pulmonary/Chest: Bilateral respiratory sounds. No rhonchi.    Abdominal: Soft. No tenderness   Musculoskeletal: No edema    Neurological: Intubated, sedated.     Active Hospital Problems    Diagnosis Date Noted    Essential hypertension [I10] 2015     Priority: Low    CAD (coronary artery disease) [I25.10] 2015     Priority: Low    Mixed hyperlipidemia [E78.2]      Priority: Low    On mechanically assisted ventilation (HCC) [Z99.11] 2024    V tach (HCC) [I47.20] 2024    CKD (chronic kidney disease), stage III (HCC) [N18.30] 2019       Past Medical History:   Diagnosis Date    Acute, but ill-defined, cerebrovascular disease     Cancer (HCC)     skin cancer of scalp - basal    Degeneration of cervical intervertebral disc     Degeneration of lumbar or lumbosacral intervertebral disc     Enlarged prostate     Irritable bowel syndrome     Proteinuria     Pure hypercholesterolemia     Unspecified cerebral artery occlusion with cerebral infarction 2011    bleed         Past Surgical History:   Procedure Laterality Date    AORTIC MITRAL VALVE REPLACEMENT      BACK SURGERY      CARDIAC CATHETERIZATION      attempted stent, but wouldn't hold    CARDIAC PROCEDURE N/A 2024    Left heart cath / coronary angiography performed by Jigar Martin MD at [facility] CARDIAC CATH LAB       Allergies   Allergen Reactions    Antihistamine [Diphenhydramine

## 2024-09-25 NOTE — PROGRESS NOTES
Pt admitted to CVU 12 from CCL. Pt intubated and sedated on arrival. Clean cath- R radial TR in place with 10 cc of air in place. Castañeda placed in ED. Orders released. Primary RN Tomasa    09/24/24  10:21 PM

## 2024-09-25 NOTE — PROGRESS NOTES
Pharmacy Home Medication Reconciliation Note    A medication reconciliation has been completed for Jonathan Dove 1940    Pharmacy: [facility] [address] [place] [address]  Information provided by: patient's daughter Lizeth    The patient's home medication list is as follows:  No current facility-administered medications on file prior to encounter.     Current Outpatient Medications on File Prior to Encounter   Medication Sig Dispense Refill    sacubitril-valsartan (ENTRESTO) 24-26 MG per tablet Take 1 tablet by mouth 2 times daily      lovastatin (MEVACOR) 20 MG tablet TAKE ONE TABLET BY MOUTH ONCE NIGHTLY (Patient taking differently: Take 1 tablet by mouth nightly) 90 tablet 0    dapagliflozin (FARXIGA) 10 MG tablet Take 1 tablet by mouth every morning      apixaban (ELIQUIS) 5 MG TABS tablet Take by mouth 2 times daily      betamethasone valerate (VALISONE) 0.1 % cream APPLY TOPICALLY TWO TIMES A DAY (Patient taking differently: as needed As needed) 45 g 1    metoprolol succinate (TOPROL XL) 25 MG extended release tablet TAKE ONE TABLET BY MOUTH DAILY (Patient taking differently: Take 1 tablet by mouth daily) 90 tablet 1    Netarsudil-Latanoprost (ROCKLATAN) 0.02-0.005 % SOLN Place 1 drop into both eyes every evening      tamsulosin (FLOMAX) 0.4 MG capsule Take 1 capsule by mouth daily      MULTIPLE VITAMIN PO Take 1 tablet by mouth daily       Cholecalciferol (VITAMIN D3) 1.25 MG (51964 UT) TABS Take 1 capsule by mouth daily       polyethylene glycol (GLYCOLAX) packet Take 1 packet by mouth every 48 hours as needed      aspirin 81 MG EC tablet Take 1 tablet by mouth daily      amitriptyline (ELAVIL) 50 MG tablet TAKE ONE TABLET BY MOUTH ONCE NIGHTLY (Patient not taking: Reported on 9/24/2024) 90 tablet 3       Patient is no longer taking amitriptyline.    Of note, patient was scheduled for angiogram this Thursday and family states he likely started holding Eliquis at 5 days prior: family states

## 2024-09-25 NOTE — PROCEDURES
INDICATION:  V-tach,   Central line for Amiodarone      Procedure performed by: Grecia Rudolph NP  SUPERVISING ATTENDING PHYSICIAN: Dr. Avina    PROCEDURE: Central venous line placement right internal jugular vein    CONSENT:  The procedure, risks, and benefits were explained to POA and consent was obtained.    TIME OUT:  The patient and procedure were properly identified with the nurse in the room.    STERILE PREP:  Full maximum sterile field/barrier technique was followed (with cap and mask and sterile gown and sterile gloves and large sterile sheet and hand hygeine and 2% chlorhexidine for cutaneous antisepsis).    LOCAL ANESTHETIC:   Aqueous lidocaine 5cc    PROCEDURE:   Using direct ultrasound guidance, a right internal jugular vein central venous catheter was placed using a modified seldinger technique without difficulty. Excellent return of non-pulsatile, dark venous blood from all lumens. All lumens were flushed with normal saline. Minimal bleeding occurred and there was hemostasis prior to conclusion of procedure. Catheter was sutured in place and a sterile dressing with biopatch was placed.    COMPLICATIONS:  None  Estimated blood loss: 10 cc    CHEST XRAY REVIEWED: Post-procedure chest x-ray is pending at this time       Grecia Rudolph NP

## 2024-09-26 PROBLEM — I47.20 VENTRICULAR TACHYCARDIA (HCC): Status: ACTIVE | Noted: 2024-01-01

## 2024-09-26 PROBLEM — K81.0 ACUTE CHOLECYSTITIS: Status: ACTIVE | Noted: 2024-09-26

## 2024-09-26 PROBLEM — R31.0 GROSS HEMATURIA: Status: ACTIVE | Noted: 2024-09-26

## 2024-09-26 NOTE — PROGRESS NOTES
[facility] - Inpatient Rehabilitation Department   Phone: [phone]    Occupational Therapy    [x] Initial Evaluation            [] Daily Treatment Note         [] Discharge Summary      Patient: Jonathan Dove   : 1940   MRN: [id number]   Date of Service:  2024    Admitting Diagnosis:  V tach (HCC)  Current Admission Summary: 83 yo male brought to [facility] by EMS on . Pt initially called EMS for lift assist, they encouraged the patient to come to the ED. Enroute pt found to be in V-tach, given meds and cardioverted enroute and again in ED. On arrival intubated for airway protection. LHC done  also, no significant findings. Extubated next day ().   Past Medical History:  has a past medical history of Acute, but ill-defined, cerebrovascular disease, Cancer (HCC), Degeneration of cervical intervertebral disc, Degeneration of lumbar or lumbosacral intervertebral disc, Enlarged prostate, Irritable bowel syndrome, Proteinuria, Pure hypercholesterolemia, and Unspecified cerebral artery occlusion with cerebral infarction.  Past Surgical History:  has a past surgical history that includes back surgery; Cardiac catheterization; Aortic mitral valve replacement; and Cardiac procedure (N/A, 2024).    Discharge Recommendations: Jonathan Dove scored a 13/ on the AM-PAC ADL Inpatient form. Current research shows that an AM-PAC score of 17 or less is typically not associated with a discharge to the patient's home setting. Based on the patient's AM-PAC score and their current ADL deficits, it is recommended that the patient have 5-7 sessions per week of Occupational Therapy at d/c to increase the patient's independence.  At this time, this patient demonstrates complex nursing, medical, and rehabilitative needs, and would benefit from intensive rehabilitation services upon discharge from the Inpatient setting.  This patient demonstrates the ability to participate in and benefit from an

## 2024-09-26 NOTE — PROGRESS NOTES
[facility] Pulmonary/CCM Progress note      Admit Date: 9/24/2024    Chief Complaint: V. tach requiring defibrillation and intubation    Subjective:     Interval History: Patient was successfully extubated yesterday, on 1 to 2 L O2.  Complains of right upper quadrant abdominal pain ?  Subcostal pain-tender to palpation, improved with Tylenol.  No significant cough or phlegm reported.    Scheduled Meds:   acetaminophen        pantoprazole  40 mg IntraVENous Daily    aspirin  81 mg Oral Daily    apixaban  5 mg Oral BID    sodium chloride flush  5-40 mL IntraVENous 2 times per day    nitroglycerin  0.5 inch Topical Q4H     Continuous Infusions:   fentaNYL 10 mcg/hr (09/25/24 0752)    phenylephrine (ARACELI-SYNEPHRINE) 50 mg in sodium chloride 0.9 % 250 mL infusion 10 mcg/min (09/26/24 1126)    lactated ringers IV soln 100 mL/hr at 09/26/24 1128    propofol Stopped (09/25/24 1050)    amiodarone (CORDARONE) 450 mg in dextrose 5 % 250 mL infusion 0.5 mg/min (09/26/24 0601)    sodium chloride       PRN Meds:acetaminophen **OR** acetaminophen, acetaminophen, iopamidol, potassium chloride, sodium chloride flush, sodium chloride, potassium chloride **OR** potassium alternative oral replacement **OR** potassium chloride, magnesium sulfate, ondansetron **OR** ondansetron, polyethylene glycol    Review of Systems  Constitutional: negative for fatigue, fevers, malaise and weight loss  Ears, nose, mouth, throat: negative for ear drainage, epistaxis, hoarseness, nasal congestion, sore throat and voice change  Respiratory: negative except for pleurisy/chest pain  Cardiovascular: negative for chest pain, chest pressure/discomfort, irregular heart beat, lower extremity edema and palpitations  Gastrointestinal: negative for abdominal pain, constipation, diarrhea, jaundice, melena, odynophagia, reflux symptoms and vomiting  Hematologic/lymphatic: negative for bleeding, easy bruising, lymphadenopathy and petechiae  Musculoskeletal:negative for

## 2024-09-26 NOTE — PROGRESS NOTES
Urology at bedside- placed 3 way 24Fr catheter, 25 mL water instilled in balloon, plan to start CBI, care ongoing

## 2024-09-26 NOTE — CONSULTS
Urology Consult Note  [facility]     Patient: Jonathan Dove MRN: [id number]  Room/Bed: CVU-2912/2912-01   YOB: 1940  Age/Sex: 84 y.o.male  Admission Date: 9/24/2024     Date of Service:  9/26/2024    Consulting Provider: John W Frankel, PA-C   Admitting/Requesting Physician: Yung Avina MD  Primary Care Physician: Raphael Huntley Jr., MD    Reason for Consult: Gross hematuria    ASSESSMENT/PLAN     84yoM w/ hx of CHF, Prostetic Mitral valve, CAD post CABG. Arrived yesterday in V-tach requiring cardioversion and intubation. Urology consulted after amaris blood noted in catheter bag, staff unable to irrigate. Patient currently on Eliquis and aspirin for DVT prevention.    Patient seen resting in bed. He was extubated yesterday, alert and orient today. He reports hematuria started earlier today. He denies any abdominal pain, dysuria or frequency.     I was able to exchange his 16F catheter for a 24F 3-way with immediate return of clot and cherry red blood. I was able to irrigate minimal clot with some resistance likely d/t large prostate.      Febrile (Tmax 101.2), bp soft  Cr 1.3, wbc 14.0, Hgb 13.0, Platelets 120  UA: small blood, negative nitrites and LE      Recommendations:    Patient and imaging reviewed with Dr. Krishnamurthy. Likely cause of bleeding at this time d/t traumatic sweeney placement through large prostate while on anticoagulation. Minimal clot seen on CT urogram, Able to irrigate some clot out via hand irrigation. Urine able to drain passively.    - Can start CBI at this time, advance as able when clearing. Stop CBI for any signs/symptoms of retention  - Hold any AC as medically able until urine clears; clear with cardiology before holding   - Will follow along with you and reassess in AM    All patient questions were answered. He understands the plan as listed above.    HISTORY     Chief Complaint:   Chief Complaint   Patient presents with    Irregular Heart Beat  artery.  JL advanced over wire to engage LMCA and JR advanced over wire to engage RCA and image in multiple views. JR/Pigtail advancd over wire into LV and LVEDP obtained, pullback gradient obtained.  Arterial hemostasis obtained with radial band.   CORONARY FINDINGS Artery Findings LM 20% ostial, no dampening, plenty contrast reflux into aorta LAD 40% mid Cx Normal RCA Normal Dominance RCA Dominant LVEDP 15mmHg Normal 3-12mmHg LVG EF N/A d/t renal function Normal >/= 55% LVG WM N/A d/t renal function AVG Normal INTERVENTION(S) None POST CATH  RECOMMENDATIONS Continue medical therapy for NICM/CHF Continue IV amio for VT Rohit as needed Echo in AM CHF consult in AM - med adjustments EP consult in AM - BIVAICD discussion - has historically not been inclined Discussed details of clinical condition and LHC with family     CT HEAD WO CONTRAST    Result Date: 9/24/2024  EXAMINATION: CT OF THE HEAD WITHOUT CONTRAST  9/24/2024 7:45 pm TECHNIQUE: CT of the head was performed without the administration of intravenous contrast. Automated exposure control, iterative reconstruction, and/or weight based adjustment of the mA/kV was utilized to reduce the radiation dose to as low as reasonably achievable. COMPARISON: None. HISTORY: ORDERING SYSTEM PROVIDED HISTORY: syncope TECHNOLOGIST PROVIDED HISTORY: Reason for exam:->syncope Has a \"code stroke\" or \"stroke alert\" been called?->No Decision Support Exception - unselect if not a suspected or confirmed emergency medical condition->Emergency Medical Condition (MA) Reason for Exam: syncope FINDINGS: BRAIN/VENTRICLES: There is no acute intracranial hemorrhage, mass effect or midline shift.  No abnormal extra-axial fluid collection.  The gray-white differentiation is maintained without evidence of an acute infarct.  There is no evidence of hydrocephalus. Mild generalized cerebral volume loss and moderate chronic microvascular ischemic changes of the periventricular and subcortical white

## 2024-09-26 NOTE — PROGRESS NOTES
Patient NPO at midnight for multiple procedures tomorrow, spoke with US- plan for US in AM, care ongoing

## 2024-09-26 NOTE — CARE COORDINATION
Discharge Planning:     (DORA) received call from Thu @ [facility] [phone], that [facility] is no longer contracted with patient's insurance, but there is a partnering facility, [facility] which is contracted with patient's  insurance.     SW spoke with patient's daughter Lizeth, who was agreeable to [facility].     SW confirmed with Thu to switch referral from [facility] to [facility].     Electronically signed by MALLORY Ayala on 9/26/24 at 3:43 PM EDT

## 2024-09-26 NOTE — PROGRESS NOTES
H&P Update    I have reviewed the history and physical and examined the patient and updated with relevant changes.     I saw patient today with his wife/daughter present and discussed ablation of atrial fibrillation.   The risks, benefits and alternatives of the ablation procedure were discussed with the patient. The risks including, but not limited to, the risks of bleeding, infection, radiation exposure, injury to vascular, cardiac and surrounding structures (including pneumothorax), stroke, cardiac perforation, tamponade, need for emergent open heart surgery, need for pacemaker implantation, Injury to the phrenic nerve, injury to the esophagus, myocardial infarction, etc were discussed in detail.     The patient opted to proceed with the ablation.     Will proceed with TYRONE. If TYRONE rules out severe MR, will proceed with ablation.       Consent: I have discussed with the patient and/or the patient representative the indication, alternatives, and the possible risks and/or complications of the planned procedure and the anesthesia methods. The patient and/or patient representative appear to understand and agree to proceed.    Vitals:    09/26/24 1200   BP: (!) 98/57   Pulse: (!) 102   Resp: (!) 31   Temp: (!) 101.2 °F (38.4 °C)   SpO2: 92%     Prior to Admission medications    Medication Sig Start Date End Date Taking? Authorizing Provider   sacubitril-valsartan (ENTRESTO) 24-26 MG per tablet Take 1 tablet by mouth 2 times daily   Yes ProviderRobin MD   lovastatin (MEVACOR) 20 MG tablet TAKE ONE TABLET BY MOUTH ONCE NIGHTLY  Patient taking differently: Take 1 tablet by mouth nightly 8/12/24  Yes Raphael Huntley Jr., MD   dapagliflozin (FARXIGA) 10 MG tablet Take 1 tablet by mouth every morning 1/19/24  Yes Robin Bhandari MD   apixaban (ELIQUIS) 2.5 MG TABS tablet Take 1 tablet by mouth 2 times daily   Yes ProviderRobin MD   betamethasone valerate (VALISONE) 0.1 % cream APPLY TOPICALLY TWO

## 2024-09-26 NOTE — DISCHARGE INSTR - COC
Continuity of Care Form    Patient Name: Jonathan Dove   :  1940  MRN:  [id number]    Admit date:  2024  Discharge date:  ***    Code Status Order: Full Code   Advance Directives:   Advance Care Flowsheet Documentation             Admitting Physician:  Yung Avina MD  PCP: Raphael Huntley Jr., MD    Discharging Nurse: ***  Discharging Hospital Unit/Room#: CVU-2912/2912-01  Discharging Unit Phone Number: ***    Emergency Contact:   Extended Emergency Contact Information  Primary Emergency Contact: BUD RICHARDSON  Home Phone: [phone]  Mobile Phone: [phone]  Relation: Child  Secondary Emergency Contact: Steffen Dove  Mobile Phone: [phone]  Relation: Child    Past Surgical History:  Past Surgical History:   Procedure Laterality Date    AORTIC MITRAL VALVE REPLACEMENT      BACK SURGERY      CARDIAC CATHETERIZATION      attempted stent, but wouldn't hold    CARDIAC PROCEDURE N/A 2024    Left heart cath / coronary angiography performed by Jigar Martin MD at [facility] CARDIAC CATH LAB       Immunization History:   Immunization History   Administered Date(s) Administered    COVID-19, MODERNA BLUE border, Primary or Immunocompromised, (age 12y+), IM, 100 mcg/0.5mL 2021, 2021, 2022    COVID-19, PFIZER PURPLE top, DILUTE for use, (age 12 y+), 30mcg/0.3mL 2022    Influenza Virus Vaccine 10/04/2002, 2010, 10/01/2014    Influenza Whole 10/04/2002, 10/07/2008, 2010, 10/01/2014    Influenza, FLUAD, (age 65 y+), IM, Quadv, 0.5mL 2020, 10/13/2021    Influenza, FLUZONE High Dose, (age 65 y+), IM, Trivalent PF, 0.5mL 10/10/2013, 10/06/2017, 10/08/2018, 10/24/2018, 2019    Pneumococcal, PCV-13, PREVNAR 13, (age 6w+), IM, 0.5mL 2015    Pneumococcal, PPSV23, PNEUMOVAX 23, (age 2y+), SC/IM, 0.5mL 2010    TDaP, ADACEL (age 10y-64y), BOOSTRIX (age 10y+), IM, 0.5mL 2015    Zoster Live (Zostavax) 2013       Active  9/24/24    Readmission Risk Assessment Score:  Readmission Risk              Risk of Unplanned Readmission:  21         Discharging to Facility/ Agency   [facility]  [address], [address]  [place], [place] [address]  Phone: [phone]  Fax:  [phone]       / signature: {Esignature:034368553}    PHYSICIAN SECTION    Prognosis: {Prognosis:5481790851}    Condition at Discharge: { Patient Condition:532391723}    Rehab Potential (if transferring to Rehab): {Prognosis:0079713722}    Recommended Labs or Other Treatments After Discharge: ***    Physician Certification: I certify the above information and transfer of Jonathan Dove  is necessary for the continuing treatment of the diagnosis listed and that he requires {Admit to Appropriate Level of Care:06446} for {GREATER/LESS:696633900} 30 days.     Update Admission H&P: {CHP DME Changes in HandP:911201727}    PHYSICIAN SIGNATURE:  {Esignature:136974708}

## 2024-09-26 NOTE — PROGRESS NOTES
[facility] - Inpatient Rehabilitation Department   Phone: [phone]    Physical Therapy    [x] Initial Evaluation            [] Daily Treatment Note         [] Discharge Summary      Patient: Jonathan Dove   : 1940   MRN: [id number]   Date of Service:  2024  Admitting Diagnosis: V tach (HCC)  Current Admission Summary: 83 yo male brought to [facility] by EMS on . Pt initially called EMS for lift assist, they encouraged the patient to come to the ED. Enroute pt found to be in V-tach, given meds and cardioverted enroute and again in ED. On arrival intubated for airway protection. LHC done  also, no significant findings. Extubated next day ().   Past Medical History:  has a past medical history of Acute, but ill-defined, cerebrovascular disease, Cancer (HCC), Degeneration of cervical intervertebral disc, Degeneration of lumbar or lumbosacral intervertebral disc, Enlarged prostate, Irritable bowel syndrome, Proteinuria, Pure hypercholesterolemia, and Unspecified cerebral artery occlusion with cerebral infarction.  Past Surgical History:  has a past surgical history that includes back surgery; Cardiac catheterization; Aortic mitral valve replacement; and Cardiac procedure (N/A, 2024).    Discharge Recommendations: Jonathan Dove scored a  on the AM-PAC short mobility form. Current research shows that an AM-PAC score of 17 or less is typically not associated with a discharge to the patient's home setting. Based on the patient's AM-PAC score and their current functional mobility deficits, it is recommended that the patient have 5-7 sessions per week of Physical Therapy at d/c to increase the patient's independence.  At this time, this patient demonstrates complex nursing, medical, and rehabilitative needs, and would benefit from intensive rehabilitation services upon discharge from the Inpatient setting.  This patient demonstrates the ability to participate in and benefit from

## 2024-09-26 NOTE — PROGRESS NOTES
Hospitalist notified of bloody urine output, see new orders, and consult to urology, care ongoing

## 2024-09-26 NOTE — PROGRESS NOTES
Patient up to chair, blood noted coming from around catheter insertion site, catheter irrigated and large clot noted in catheter tubing, flushed through ,  CT called , and Urology called, urologist on route to hospital, see new orders, care on going

## 2024-09-26 NOTE — CARE COORDINATION
Case Management Assessment  Initial Evaluation    Date/Time of Evaluation: 9/26/2024 12:53 PM  Assessment Completed by: MALLORY Ayala    If patient is discharged prior to next notation, then this note serves as note for discharge by case management.    Patient Name: Jonathan Dove                   YOB: 1940  Diagnosis: Syncope and collapse [R55]  Hypokalemia [E87.6]  Ventricular tachycardia (HCC) [I47.20]  Septicemia (HCC) [A41.9]  STEMI (ST elevation myocardial infarction) (HCC) [I21.3]  Acute encephalopathy [G93.40]  V tach (HCC) [I47.20]                   Date / Time: 9/24/2024  7:47 PM    Patient Admission Status: Inpatient   Readmission Risk (Low < 19, Mod (19-27), High > 27): Readmission Risk Score: 15.7    Current PCP: Raphael Huntley Jr., MD  PCP verified by CM? (P) Yes    Chart Reviewed: Yes      History Provided by: (P) Patient  Patient Orientation: (P) Alert and Oriented    Patient Cognition: (P) Alert    Hospitalization in the last 30 days (Readmission):  No    If yes, Readmission Assessment in CM Navigator will be completed.    Advance Directives:      Code Status: Full Code   Patient's Primary Decision Maker is: (P) Legal Next of Kin    Primary Decision Maker: BUD RICHARDSON - Child - [phone]    Discharge Planning:    Patient lives with: (P) Alone Type of Home: (P) House  Primary Care Giver: (P) Self  Patient Support Systems include: (P) Children   Current Financial resources: (P) None  Current community resources: (P) None  Current services prior to admission: (P) None            Current DME:              Type of Home Care services:  (P) OT, PT, Nursing Services    ADLS  Prior functional level: (P) Independent in ADLs/IADLs  Current functional level: (P) Assistance with the following:, Mobility    PT AM-PAC:   /24  OT AM-PAC:   /24    Family can provide assistance at DC: (P) Yes  Would you like Case Management to discuss the discharge plan with any other family

## 2024-09-26 NOTE — PROGRESS NOTES
[facility] [place]   Daily Progress Note      Admit Date:  9/24/2024    HPI:    Mr. Dove is an 84 year old male with history of hyperlipidemia, status post CAD, AVR and MVR/MALLORY clip in 1/2020, declined ablation for NSVT, atrial flutter on eliquis.  He follows with Dr Chen at [facility]     He was brought to the ER after being found to be in VT.  BP in the 80s, given versed and shocked, intubated and sedated in the ED.  [facility] no intervention.  It has been discussed in the past about ICD but patient had declined      Subjective:  Patient is being seen for acute on chronic systolic heart failure. There were no acute overnight cardiac events.  His daughter and son are at his side.  He is extubated and on 1 L of oxygen.  He is still on some flako.  He has blood urine in sweeney and tells me had seen a urologist in the past for a small blood vessel issue.  No shortness of breath, chest pain.  He was on farxig, entresto and toprol as an outpatient prior to admission  AF with rates in the .    Creat 1.3 bnp 3901    Objective:   /69   Pulse 88   Temp 99.4 °F (37.4 °C) (Temporal)   Resp 21   Ht 1.727 m (5' 8\")   Wt 72.9 kg (160 lb 11.5 oz)   SpO2 93%   BMI 24.44 kg/m²     Intake/Output Summary (Last 24 hours) at 9/26/2024 0917  Last data filed at 9/26/2024 0814  Gross per 24 hour   Intake 1922.23 ml   Output 860 ml   Net 1062.23 ml          Physical Exam:  General:  alert and oriented  Skin:  Warm and dry.  No unusual bruising or rash  Neck:  Supple.  No JVD or carotid bruit appreciated  Chest:  Normal effort.  Clear to auscultation, no wheezes/rhonchi/rales  Cardiovascular:  AF, S1/S2, no murmur/gallop/rub  Abdomen:  Soft, nontender, +bowel sounds  Extremities:  No edema  Neurological: no focal deficits  Psychological: normal mood and affect      Medications:    pantoprazole  40 mg IntraVENous Daily    aspirin  81 mg Oral Daily    apixaban  5 mg Oral BID    sodium chloride flush  5-40 mL IntraVENous 2

## 2024-09-26 NOTE — PROGRESS NOTES
Hospitalist Progress Note    Name:  Jonathan Dove    /Age/Sex: 1940  (84 y.o. male)  MRN & CSN:  [id number] & [id number]    PCP: Raphael Huntley Jr., MD    Date of Admission: 2024    Patient Status:  Inpatient     Chief Complaint:   Chief Complaint   Patient presents with    Irregular Heart Beat     Pt from home via [facility] for unstable V-tach, per EMS pt was in vtach but was alert and oriented with SBP in 80s. EMS gave 5 mg versed en route and cardioverted to NSR with PVCs, pt being bagged upon arrival, pulse still present       Hospital Course:   Patient admitted for V tach. Was shocked 1x on admission. Was intubated for cardioversion. Cardiology consulted. CCM consulted.    Started on amiodarone drip by cardiology.     Extubated on .    Subjective:  Today is:  Hospital Day: 3.  Patient seen and examined in CVU-/2912.     Lying in bed. Having some right sided rib pain. No chest pain or SOB. Having some darker red/brown urine today.    Family at bedside and updated.      Medications:  Reviewed    Infusion Medications    fentaNYL 10 mcg/hr (24 0752)    phenylephrine (ARACELI-SYNEPHRINE) 50 mg in sodium chloride 0.9 % 250 mL infusion 10 mcg/min (24 1126)    lactated ringers IV soln 100 mL/hr at 24 1128    propofol Stopped (24 1050)    amiodarone (CORDARONE) 450 mg in dextrose 5 % 250 mL infusion 0.5 mg/min (24 0601)    sodium chloride       Scheduled Medications    pantoprazole  40 mg IntraVENous Daily    aspirin  81 mg Oral Daily    apixaban  5 mg Oral BID    sodium chloride flush  5-40 mL IntraVENous 2 times per day    nitroglycerin  0.5 inch Topical Q4H     PRN Meds: potassium chloride, sodium chloride flush, sodium chloride, potassium chloride **OR** potassium alternative oral replacement **OR** potassium chloride, magnesium sulfate, ondansetron **OR** ondansetron, polyethylene glycol, acetaminophen **OR** acetaminophen      Intake/Output Summary  (Last 24 hours) at 9/26/2024 1229  Last data filed at 9/26/2024 1000  Gross per 24 hour   Intake 1922.23 ml   Output 885 ml   Net 1037.23 ml       Physical Exam Performed:    /73   Pulse 98   Temp 98.8 °F (37.1 °C) (Temporal)   Resp 28   Ht 1.727 m (5' 8\")   Wt 72.9 kg (160 lb 11.5 oz)   SpO2 90%   BMI 24.44 kg/m²     General appearance: No apparent distress, appears stated age and cooperative.   HEENT: Pupils equal, round, and reactive to light. Conjunctivae/corneas clear. NC present.  Neck: Supple, with full range of motion. No jugular venous distention. Trachea midline.  Respiratory:  Normal respiratory effort. Clear to auscultation, bilaterally without Rales/Wheezes/Rhonchi.  Cardiovascular: Regular rate and rhythm with normal S1/S2 without murmurs, rubs or gallops. No peripheral edema.  Abdomen: Soft, non-tender, non-distended with normal bowel sounds.  : No CVA tenderness.  Musculoskeletal: No clubbing or cyanosis.  Full range of motion without deformity.  Skin: Skin color, texture, turgor normal.  No rashes or lesions.  Neurologic:  Neurovascularly intact without any focal sensory/motor deficits. Cranial nerves: II-XII intact, grossly non-focal.  Psychiatric: Alert and oriented, thought content appropriate, normal insight  Peripheral Pulses: +2 palpable, equal bilaterally   Capillary Refill: Brisk, 3 seconds, normal      Labs:   Recent Labs     09/25/24 0520 09/25/24  0743 09/26/24  0425   WBC 13.8* 14.5* 14.0*   HGB 14.3 14.4 13.0*   HCT 44.2 44.0 38.7*   * 140 120*     Recent Labs     09/24/24 2007 09/25/24  0520 09/26/24  0425    141 134*   K 3.4* 4.8 3.5    111* 104   CO2 19* 21 18*   BUN 17 18 21*   CREATININE 1.6* 1.3 1.3   CALCIUM 8.2* 8.1* 8.0*     Recent Labs     09/24/24 2007   AST 17   ALT 10   BILIDIR 0.3   BILITOT 0.9   ALKPHOS 79     No results for input(s): \"INR\" in the last 72 hours.  No results for input(s): \"CKTOTAL\", \"TROPONINI\" in the last 72

## 2024-09-26 NOTE — PROGRESS NOTES
09/26/24 1719   Incentive Spirometry Tx   Predicted Volume 684   Achieved Volume (mL) 1000 mL

## 2024-09-26 NOTE — PROGRESS NOTES
Physician Progress Note      PATIENT:               BELKYS NGUYEN  CSN #:                  [id number]  :                       1940  ADMIT DATE:       2024 7:47 PM  DISCH DATE:  RESPONDING  PROVIDER #:        Rangel Devries DO          QUERY TEXT:    Patient admitted with Vtach. Noted documentation of intubation for airway   protection in the H&P and acute respiratory failure in PN . Please   indicate one of the following and document in the medical record:    The medical record reflects the following:  Risk Factors: 83 yo in vtach, S/P defibrillation  Clinical Indicators: Per PN : Acute hypoxic respiratory failure, required   intubation following sedation for airway protection following defibrillation.  Treatment: As above  Options provided:  -- Intubated for airway protection only, Acute Respiratory Failure ruled out   after study  -- Intubated for Acute Respiratory Failure as evidenced by, Please document   evidence.  -- Other - I will add my own diagnosis  -- Disagree - Not applicable / Not valid  -- Disagree - Clinically unable to determine / Unknown  -- Refer to Clinical Documentation Reviewer    PROVIDER RESPONSE TEXT:    This patient was intubated for airway protection only, Acute Respiratory   Failure has been ruled out after study.    Query created by: Wilda Ortiz on 2024 9:28 AM      Electronically signed by:  Rangel Devries DO 2024 11:15 AM

## 2024-09-26 NOTE — PROGRESS NOTES
[facility]   Electrophysiology   Date: 9/26/2024  Reason for Follow up: VT   Chief Complaint   Patient presents with    Irregular Heart Beat     Pt from home via [facility] for unstable V-tach, per EMS pt was in vtach but was alert and oriented with SBP in 80s. EMS gave 5 mg versed en route and cardioverted to NSR with PVCs, pt being bagged upon arrival, pulse still present       HPI: Jonathan Dove is a 84 y.o. male with h/o atrial fibrillation/flutter, PVCs, HLD, CVA, mild to moderate CAD.    S/p MVR (31 mm Magna Ease), AVR (25 mm Inspiris), and MALLORY clip in January 2020 at [facility].    Pt was brought to the hospital by EMS after he was found to have wide-complex tachycardia/VT and hypotension and had to be cardioverted en route to the hospital.  Patient has been intubated in the emergency room and has been admitted to the CVU.    He also has history of PVC in the past treated with Amiodarone and declined ablation.     He has been seen for Afib/flutter and had cardioversion at [facility]. Saw EP at [facility] and declined ablation and treated with amiodarone and and had cardioversion.      His ejection fraction got worse over time to 25-30% and Entresto / Farxiga was added and he was scheduled for LHC at [facility].      AICD was discussed with him at [facility].      Family states that he was not feeling well for the past few weeks and he was scheduled for LHC.      He has undergone cardiac catheterization which showed non-obstructive CAD with LV dysfunction with no intervention.       Interval History:    Pt seen at bedside for follow up. Clinical notes and telemetry reviewed.  Pt denies SOB, palpitations, lightheadedness, edema, PND/orthopnea. Having some pain in his R side that is worse with inspiration.      Assessment/Plan:     VT  - S/p cardioversion en route to hospital for WCT  - Having PVCs and triplets/short NSVT runs but no sustained VT on telemetry  - LHC with nonobstructive CAD  - Electrolytes  the patient verbalized understanding and agreed with the plan.

## 2024-09-27 PROBLEM — K80.00 ACUTE CHOLECYSTITIS DUE TO BILIARY CALCULUS: Status: ACTIVE | Noted: 2024-09-26

## 2024-09-27 NOTE — CONSULTS
Jonathan Dove    CC-abdominal pain and    HPI: 84-year-old male who fell at home prompting a call to 911.  The patient was found to be in V. tach and was treated with defibrillation.  He was extubated 2 days ago.  Upon awakening, he complained of right upper quadrant abdominal pain which is sharp and severe (9-10/10).  The pain is worse with movement or deep inspiration.  Nothing makes it better.  In retrospect, he started having abdominal pain 4 days ago.  Associated symptoms include nausea, abdominal bloating, decreased appetite and fevers.  The patient is on Eliquis for atrial fibrillation.  Imaging revealed findings consistent with acute calculus cholecystitis.    Past Medical History:   Diagnosis Date    Acute, but ill-defined, cerebrovascular disease     Cancer (HCC)     skin cancer of scalp - basal    Degeneration of cervical intervertebral disc     Degeneration of lumbar or lumbosacral intervertebral disc     Enlarged prostate     Irritable bowel syndrome     Proteinuria     Pure hypercholesterolemia     Unspecified cerebral artery occlusion with cerebral infarction 2011    bleed        Past Surgical History:   Procedure Laterality Date    AORTIC MITRAL VALVE REPLACEMENT      BACK SURGERY      CARDIAC CATHETERIZATION      attempted stent, but wouldn't hold    CARDIAC PROCEDURE N/A 9/24/2024    Left heart cath / coronary angiography performed by Jigar Martin MD at [facility] CARDIAC CATH LAB       Social History     Socioeconomic History    Marital status:      Spouse name: Maira    Number of children: 4    Years of education: 12 years    Highest education level: Not on file   Occupational History    Not on file   Tobacco Use    Smoking status: Never    Smokeless tobacco: Never   Vaping Use    Vaping status: Never Used   Substance and Sexual Activity    Alcohol use: No    Drug use: No    Sexual activity: Not on file   Other Topics Concern    Not on file   Social History Narrative    Not on file   Left Ear: External ear normal.   Eyes:      Conjunctiva/sclera: Conjunctivae normal.   Cardiovascular:      Rate and Rhythm: Normal rate. Rhythm irregular.   Pulmonary:      Effort: Pulmonary effort is normal.      Breath sounds: Normal breath sounds.   Abdominal:      General: There is no distension.      Palpations: Abdomen is soft.      Tenderness: There is abdominal tenderness.   Musculoskeletal:         General: Normal range of motion.      Cervical back: Normal range of motion and neck supple.   Skin:     General: Skin is warm and dry.   Neurological:      Mental Status: He is alert and oriented to person, place, and time.   Psychiatric:         Behavior: Behavior normal.     Bilirubin total-1.5  Bilirubin direct-1.2  Lipase-12.0  White blood count-12.6    Assessment:  84-year-old male who fell 3 days ago prompting a call to 911.  The patient was in V. tach and underwent defibrillation.  He was intubated and admitted to the hospital.  Upon extubation 2 days ago, he began complaining of right upper quadrant abdominal pain.  Associated symptoms include nausea, anorexia and fevers.  Physical examination reveals focal right upper quadrant abdominal tenderness.  Pertinent labs are listed above.  Right upper quadrant ultrasound shows findings consistent with acute calculus cholecystitis.  This correlates with clinical findings.  The patient's last dose of Eliquis was yesterday.  His pain has improved since admission with IV antibiotics.    Plan:  Continue to hold Eliquis  Continue IV antibiotics  Will await further cardiology input regarding patient's risk for general anesthesia/surgery.    Patient will need an AICD  If not a candidate for surgery, will treat acute cholecystitis with antibiotics +/- cholecystostomy  Management of of bladder clots/urinary retention per  team    Valentino Cortez MD  9/27/2024

## 2024-09-27 NOTE — PROGRESS NOTES
[facility] - Inpatient Rehabilitation Department   Phone: [phone]    Occupational Therapy    [] Initial Evaluation            [x] Daily Treatment Note         [] Discharge Summary      Patient: Jonathan Dove   : 1940   MRN: [id number]   Date of Service:  2024    Admitting Diagnosis:  V tach (HCC)  Current Admission Summary: 85 yo male brought to [facility] by EMS on . Pt initially called EMS for lift assist, they encouraged the patient to come to the ED. Enroute pt found to be in V-tach, given meds and cardioverted enroute and again in ED. On arrival intubated for airway protection. LHC done  also, no significant findings. Extubated next day ().   Past Medical History:  has a past medical history of Acute, but ill-defined, cerebrovascular disease, Cancer (HCC), Degeneration of cervical intervertebral disc, Degeneration of lumbar or lumbosacral intervertebral disc, Enlarged prostate, Irritable bowel syndrome, Proteinuria, Pure hypercholesterolemia, and Unspecified cerebral artery occlusion with cerebral infarction.  Past Surgical History:  has a past surgical history that includes back surgery; Cardiac catheterization; Aortic mitral valve replacement; and Cardiac procedure (N/A, 2024).    Discharge Recommendations: Jonathan Dove scored a 15/24 on the AM-PAC ADL Inpatient form. Current research shows that an AM-PAC score of 17 or less is typically not associated with a discharge to the patient's home setting. Based on the patient's AM-PAC score and their current ADL deficits, it is recommended that the patient have 5-7 sessions per week of Occupational Therapy at d/c to increase the patient's independence.  At this time, this patient demonstrates complex nursing, medical, and rehabilitative needs, and would benefit from intensive rehabilitation services upon discharge from the Inpatient setting.  This patient demonstrates the ability to participate in and benefit from an  hearing  Sensation:   denies numbness and tingling  ROM:   (L) Shoulder: 0-90 flex     (R) Shoulder: 0-90 flex  (B) Elbow AROM WFL  Strength:   (L) Shoulder: -3     (R) Shoulder: -3  WFL distally, equal bilaterally    9/27 - Finger to Nose: Impaired Bilaterally  Heel to Christianson: Impaired Bilaterally    Pt reporting dysmetric deficits (undershooting) when attempting to wash face with RUE, RN aware      Subjective  General: Pt supine in bed upon arrival, agreeable to tx - dtr present initially and at EOS  Pain: 0/10   Pain Interventions: not applicable        Activities of Daily Living  Basic Activities of Daily Living  Grooming: stand by assistance  Grooming Comments: washing face seated  Lower Extremity Dressing: maximum assistance  Dressing Comments: changing socks  Toileting: maximum assistance.    Toileting Comments: A for thoroughness with pericare s/p BM at toilet, able to wipe seated but total A for pericare at EOB d/t presence of BM smear. sweeney cath with CBI present  Instrumental Activities of Daily Living  No IADL completed on this date.    Functional Mobility  Bed Mobility:  Supine to Sit: minimal assistance  Scooting: contact guard assistance  Comments:  Transfers:  Sit to stand transfer:moderate assistance, from EOB -- min A from recliner  Stand to sit transfer: minimal assistance  Stand step transfer: moderate assistance, EOB > recliner  Toilet transfer: minimal assistance  Comments:   Functional Mobility  Functional Mobility Activity: to/from bathroom  Device Use: rolling walker  Required Assistance: 2 person assistance with min x2   Balance:  Static Sitting Balance: fair (+): maintains balance at SBA/supervision without use of UE support  Dynamic Sitting Balance: fair (-): maintains balance at CGA with use of UE support  Static Standing Balance: poor: requires mod (A) to maintain balance  Comments:    Other Therapeutic Interventions    Second Session: RN requesting assist for pt to get back to bed. OT

## 2024-09-27 NOTE — PROGRESS NOTES
[facility] Pulmonary/CCM Progress note      Admit Date: 9/24/2024    Chief Complaint: V. tach requiring defibrillation and intubation    Subjective:     Interval History: Right upper quadrant pain related to acute cholecystitis/cholelithiasis.  No significant dyspnea, currently on 2 L O2.  Denies any cough or phlegm.  Hematuria requiring bladder irrigation, Eliquis on hold.    Scheduled Meds:   amiodarone  400 mg Oral Daily    sacubitril-valsartan  0.5 tablet Oral QHS    lactobacillus  1 capsule Oral BID WC    sodium chloride flush  5-40 mL IntraVENous 2 times per day    piperacillin-tazobactam  3,375 mg IntraVENous Q8H    pantoprazole  40 mg IntraVENous Daily    aspirin  81 mg Oral Daily    [Held by provider] apixaban  5 mg Oral BID    sodium chloride flush  5-40 mL IntraVENous 2 times per day     Continuous Infusions:   sodium chloride      phenylephrine (ARACELI-SYNEPHRINE) 50 mg in sodium chloride 0.9 % 250 mL infusion Stopped (09/26/24 1725)    lactated ringers IV soln 100 mL/hr at 09/27/24 1517    sodium chloride 5 mL/hr at 09/27/24 0620     PRN Meds:acetaminophen **OR** acetaminophen, sodium chloride flush, sodium chloride, benzocaine-menthol, morphine, potassium chloride, sodium chloride flush, sodium chloride, potassium chloride **OR** potassium alternative oral replacement **OR** potassium chloride, magnesium sulfate, ondansetron **OR** ondansetron, polyethylene glycol    Review of Systems  Constitutional: negative for fatigue, fevers, malaise and weight loss  Ears, nose, mouth, throat: negative for ear drainage, epistaxis, hoarseness, nasal congestion, sore throat and voice change  Respiratory: negative except for pleurisy/chest pain  Cardiovascular: negative for chest pain, chest pressure/discomfort, irregular heart beat, lower extremity edema and palpitations  Gastrointestinal: negative for abdominal pain, constipation, diarrhea, jaundice, melena, odynophagia, reflux symptoms and vomiting  Hematologic/lymphatic:

## 2024-09-27 NOTE — PLAN OF CARE
[facility]        Assessment & Plan of Care    History of Present Illness: Mr. Dove is a 84 y.o. male who presented to the ED 3 days ago for unstable VT which required defibrillation. He had fallen at home Tuesday evening (3 days ago) which prompted the 911 call and EMS found to be in VT with hypotension. Started feeling \"bad\" 4 days ago with bloating and periumbilical pain with poor appetite.  He was extubated 2 days ago, at which time he noted right upper quadrant pain that worsened yesterday becoming sharp and severe (9-10/10). The pain has been constant for the last 2 days but intensity fluctuates, currently rates the pain a 2-3/10. Does not radiate. Pain is aggravated with movement and deep inspiration; no alleviating factors noted. Associated nausea and he reports postprandial bloating but denies postprandial pain. Has constipation at baseline. He denies vomiting, diarrhea, hematochezia, melena, dysuria, or similar symptoms in the past. No prior EGD. Remote colonoscopy; 10+ years ago. Takes Eliquis for atrial fibrillation, last dose was yesterday (9/26/2024) morning. Other medical history includes IBS, skin cancer, hyperlipidemia, and history of stroke. No prior abdominal surgery. Nonsmoker.    Physical Exam:  CONSTITUTIONAL:  alert, no apparent distress and normal weight  NEUROLOGIC:  Mental Status Exam:  Level of Alertness:   awake  Orientation:   person, place, time  EYES:  sclera clear  ENT:  normocepalic, without obvious abnormality  NECK:  supple, symmetrical, trachea midline  LUNGS:  clear to auscultation  CARDIOVASCULAR:  regular rate and rhythm and no murmur noted  ABDOMEN: soft, non-distended, non-tender, voluntary guarding absent, no masses palpated, normal bowel sounds,  no scars, and hernia absent  Extremities: no edema  SKIN:  no bruising or bleeding and normal skin color, texture, turgor    Assessment:  Acute calculous cholecystitis  VT, requiring

## 2024-09-27 NOTE — PROGRESS NOTES
[facility] - Inpatient Rehabilitation Department   Phone: [phone]    Physical Therapy    [] Initial Evaluation            [x] Daily Treatment Note         [] Discharge Summary      Patient: Jonathan Dove   : 1940   MRN: [id number]   Date of Service:  2024  Admitting Diagnosis: V tach (HCC)  Current Admission Summary: 83 yo male brought to [facility] by EMS on . Pt initially called EMS for lift assist, they encouraged the patient to come to the ED. Enroute pt found to be in V-tach, given meds and cardioverted enroute and again in ED. On arrival intubated for airway protection. LHC done  also, no significant findings. Extubated next day ().   Past Medical History:  has a past medical history of Acute, but ill-defined, cerebrovascular disease, Cancer (HCC), Degeneration of cervical intervertebral disc, Degeneration of lumbar or lumbosacral intervertebral disc, Enlarged prostate, Irritable bowel syndrome, Proteinuria, Pure hypercholesterolemia, and Unspecified cerebral artery occlusion with cerebral infarction.  Past Surgical History:  has a past surgical history that includes back surgery; Cardiac catheterization; Aortic mitral valve replacement; and Cardiac procedure (N/A, 2024).    Discharge Recommendations: Jonathan Dove scored a  on the AM-PAC short mobility form. Current research shows that an AM-PAC score of 17 or less is typically not associated with a discharge to the patient's home setting. Based on the patient's AM-PAC score and their current functional mobility deficits, it is recommended that the patient have 5-7 sessions per week of Physical Therapy at d/c to increase the patient's independence.  At this time, this patient demonstrates complex nursing, medical, and rehabilitative needs, and would benefit from intensive rehabilitation services upon discharge from the Inpatient setting.  This patient demonstrates the ability to participate in and benefit from  telemetry, sweeney catheter (hematuria), 2L O2 via NC  Posture:   Good  Sensation:   denies numbness and tingling  Proprioception:    WFL  Tone:   Normotonic  Coordination Testing:   Finger to Nose: Impaired Bilaterally  Heel to Christianson: Impaired Bilaterally    To a mild degree.  Pt. Reporting dysmetria, undershooting with B UE's.  Tested and noted as well.  Reports this being new since at hospital.    Subjective  General: Patient reclined in bed upon therapist arrival. Agreeable to PT/OT, daughter present   Pain: Patient does not rate upon questioning -  Pain Interventions: not applicable       Functional Mobility  Bed Mobility:  Supine to Sit: minimal assistance  Scooting: minimal assistance  Comments: HOB elevated for supine>sit with increased cues for sequencing.  Transfers:  Sit to stand transfer: minimal assistance, moderate assistance  Stand to sit transfer: minimal assistance, moderate assistance  Stand step transfer: minimal assistance, moderate assistance  Comments: Pt. Keeping knees in a flexed position B, was able to straighten with cuing but not to full extension.    Ambulation:  Surface:level surface  Assistive Device: rolling walker  Other Appliance: supplemental O2, Heart monitor, IV, continuous irrigation for bladder, heart monitor  Assistance: 2 person assistance with Min A x 2   Distance: 2-3 steps x 1, 10' x 2  Gait Mechanics: flexed forward, B flexed knees, decreased step length, decreased daniel, poor steering of the walker.    Comments:  SpO2 decreased initially and RN increased from 2-4L bringing SpO2 from 88-96%.   Stair Mobility:  Stair mobility not completed on this date.  Comments:  Wheelchair Mobility:  No w/c mobility completed on this date.  Comments:  Balance:  Static Sitting Balance: fair (-): maintains balance at CGA with use of UE support  Dynamic Sitting Balance: poor (+): requires min (A) to maintain balance  Static Standing Balance: poor (-): requires max (A) to maintain

## 2024-09-27 NOTE — PROGRESS NOTES
Urology Progress Note  [facility]     Patient: Jonathan Dove MRN: [id number]  Room/Bed: CVU-2912/2912-01   YOB: 1940  Age/Sex: 84 y.o.male  Admission Date: 9/24/2024     Date of Service:  9/27/2024    ASSESSMENT/PLAN     1. Ventricular tachycardia (HCC)    2. STEMI (ST elevation myocardial infarction) ([facility])    3. Syncope and collapse    4. Acute encephalopathy    5. Septicemia (HCC)    6. Hypokalemia    7. V tach (HCC)      84yoM w/ hx of CHF, Prostetic Mitral valve, CAD post CABG. Arrived yesterday in V-tach requiring cardioversion and intubation. Urology consulted after amaris blood noted in catheter bag, staff unable to irrigate. Patient currently on Eliquis and aspirin for DVT prevention.   New 24f 3 way catheter placed to CBI yesterday. Urine appears pink tinged today.  Afebrile. Cr stable. Hgb 12.3     Recommendations:  Patient and imaging reviewed with Dr. Krishnamurthy. Likely cause of bleeding at this time d/t traumatic sweeney placement through large prostate while on anticoagulation. Minimal clot seen on CT urogram. Sweeney draining well and urine clearing on CBI. Plan to clamp CBI overnight. Resume AC when urine a clear yellow x24-48 hours. Continue sweeney and remove prior to discharge when more stable - pt prefers this. Will follow     All patient questions were answered. He understands the plan as listed above.    SUBJECTIVE     Chief Complaint:   Chief Complaint   Patient presents with    Irregular Heart Beat     Pt from home via [facility] for unstable V-tach, per EMS pt was in vtach but was alert and oriented with SBP in 80s. EMS gave 5 mg versed en route and cardioverted to NSR with PVCs, pt being bagged upon arrival, pulse still present       24 Hour Events: Today patient reports doing well tolerating sweeney.  Otherwise symptoms are overall improved and pain is adequately controlled.  Denies nausea/vomiting.  No other genitourinary symptoms.    OBJECTIVE     Hospital  consult in AM - med adjustments EP consult in AM - BIVAICD discussion - has historically not been inclined Discussed details of clinical condition and LHC with family     CT HEAD WO CONTRAST    Result Date: 9/24/2024  EXAMINATION: CT OF THE HEAD WITHOUT CONTRAST  9/24/2024 7:45 pm TECHNIQUE: CT of the head was performed without the administration of intravenous contrast. Automated exposure control, iterative reconstruction, and/or weight based adjustment of the mA/kV was utilized to reduce the radiation dose to as low as reasonably achievable. COMPARISON: None. HISTORY: ORDERING SYSTEM PROVIDED HISTORY: syncope TECHNOLOGIST PROVIDED HISTORY: Reason for exam:->syncope Has a \"code stroke\" or \"stroke alert\" been called?->No Decision Support Exception - unselect if not a suspected or confirmed emergency medical condition->Emergency Medical Condition (MA) Reason for Exam: syncope FINDINGS: BRAIN/VENTRICLES: There is no acute intracranial hemorrhage, mass effect or midline shift.  No abnormal extra-axial fluid collection.  The gray-white differentiation is maintained without evidence of an acute infarct.  There is no evidence of hydrocephalus. Mild generalized cerebral volume loss and moderate chronic microvascular ischemic changes of the periventricular and subcortical white matter. ORBITS: The visualized portion of the orbits demonstrate no acute abnormality. SINUSES: The visualized paranasal sinuses and mastoid air cells demonstrate no acute abnormality. SOFT TISSUES/SKULL:  No acute abnormality of the visualized skull or soft tissues.     No acute intracranial abnormality. Mild generalized cerebral volume loss and moderate chronic microvascular ischemic changes of the periventricular and subcortical white matter.     XR CHEST PORTABLE    Result Date: 9/24/2024  EXAMINATION: ONE XRAY VIEW OF THE CHEST 9/24/2024 8:03 pm COMPARISON: Chest x-ray dated 03/13/2010. HISTORY: ORDERING SYSTEM PROVIDED HISTORY: v tach,

## 2024-09-27 NOTE — PROGRESS NOTES
Hospitalist Progress Note    Name:  Jonathan Dove    /Age/Sex: 1940  (84 y.o. male)  MRN & CSN:  [id number] & [id number]    PCP: Raphael Huntley Jr., MD    Date of Admission: 2024    Patient Status:  Inpatient     Chief Complaint:   Chief Complaint   Patient presents with    Irregular Heart Beat     Pt from home via [facility] EMS for unstable V-tach, per EMS pt was in vtach but was alert and oriented with SBP in 80s. EMS gave 5 mg versed en route and cardioverted to NSR with PVCs, pt being bagged upon arrival, pulse still present       Hospital Course:   Patient admitted for V tach. Was shocked 1x on admission. Was intubated for cardioversion. Cardiology consulted. CCM consulted.    Started on amiodarone drip by cardiology.     Extubated on .    Subjective:  Today is:  Hospital Day: 4.  Patient seen and examined in CVU-/2912.     Lying in bed. Having some right sided rib pain. No chest pain or SOB. Having some darker red/brown urine today.    Family at bedside and updated.      Medications:  Reviewed    Infusion Medications    sodium chloride      phenylephrine (ARACELI-SYNEPHRINE) 50 mg in sodium chloride 0.9 % 250 mL infusion Stopped (24 1725)    lactated ringers IV soln 100 mL/hr at 24 1517    sodium chloride 5 mL/hr at 24 0620     Scheduled Medications    amiodarone  400 mg Oral Daily    sacubitril-valsartan  0.5 tablet Oral QHS    lactobacillus  1 capsule Oral BID WC    sodium chloride flush  5-40 mL IntraVENous 2 times per day    piperacillin-tazobactam  3,375 mg IntraVENous Q8H    pantoprazole  40 mg IntraVENous Daily    aspirin  81 mg Oral Daily    [Held by provider] apixaban  5 mg Oral BID    sodium chloride flush  5-40 mL IntraVENous 2 times per day     PRN Meds: acetaminophen **OR** acetaminophen, sodium chloride flush, sodium chloride, benzocaine-menthol, morphine, potassium chloride, sodium chloride flush, sodium chloride, potassium chloride **OR**  approximately 3.8 cm from the   yunior.   2. Small left pleural effusion and/or basilar atelectasis.                 Assessment/Plan:    Active Hospital Problems    Diagnosis     Gross hematuria [R31.0]     Acute cholecystitis [K81.0]     On mechanically assisted ventilation (HCC) [Z99.11]     Chronic atrial flutter (HCC) [I48.92]     Chronic systolic congestive heart failure (HCC) [I50.22]     Acute respiratory failure with hypoxia [J96.01]     Somnolence [R40.0]     V tach (HCC) [I47.20]     CKD (chronic kidney disease), stage III (HCC) [N18.30]     Essential hypertension [I10]     CAD (coronary artery disease) [I25.10]     Mixed hyperlipidemia [E78.2]          Hospital Day: 4    This is a 84 y.o. male [facility] and is being treated for:    Vtach  -Cardioversion x1 in ED  -If pressors are needed, use flako; keep MAP >65  -Will likely need pacemaker/defibrillator placed - defer to EP  -Daily labs; replace electrolytes as needed  -Cardiology consulted  -resolved, possible AICD monday    On mechanical ventilation - resolved  -Extubated on 9/25  -Monitor O2 sat  -CCM consulted    HFrEF - new issue  -Probably 2/2 above  -Echo 9/25/24 showed LVEF 30-35%  -Monitor EF over next few days  -I/Os    CKD III  -Creatinine 1.6 on admission; baseline approximately 1.3-1.7  -Avoid nephrotoxins  -Daily labs; trend creatinine    Chronic atrial flutter  -Continue eliquis  -Defer amiodarone drip to cardiology  - holding eliquis for probable procedures on Monday    Acute cholecytitis?  - pain better today.  - general surgery following  -possible procedure on Monday.  - holding eliquis    Hypertension  -Continue home antihypertensives    Hyperlipidemia  -Continue home statin      Discussed management of the case with cardiology who recommended defibrillator placement    Drugs that require monitoring for toxicity include: Eliquis and the method of monitoring was/is CBC    DVT ppx: Oral anticoagulation -

## 2024-09-27 NOTE — PROGRESS NOTES
[facility]   Electrophysiology   Date: 9/27/2024  Reason for Follow up: VT   Chief Complaint   Patient presents with    Irregular Heart Beat     Pt from home via [facility] EMS for unstable V-tach, per EMS pt was in vtach but was alert and oriented with SBP in 80s. EMS gave 5 mg versed en route and cardioverted to NSR with PVCs, pt being bagged upon arrival, pulse still present       HPI: Jonathan Dove is a 84 y.o. male with h/o atrial fibrillation/flutter, PVCs, HLD, CVA, mild to moderate CAD.    S/p MVR (31 mm Magna Ease), AVR (25 mm Inspiris), and MALLORY clip in January 2020 at [facility].    Pt was brought to the hospital by EMS after he was found to have wide-complex tachycardia/VT and hypotension and had to be cardioverted en route to the hospital.  Patient has been intubated in the emergency room and has been admitted to the CVU.    He also has history of PVC in the past treated with Amiodarone and declined ablation.     He has been seen for Afib/flutter and had cardioversion at [facility]. Saw EP at [facility] and declined ablation and treated with amiodarone and and had cardioversion.      His ejection fraction got worse over time to 25-30% and Entresto / Farxiga was added and he was scheduled for LHC at [facility].      AICD was discussed with him at [facility].      Family states that he was not feeling well for the past few weeks and he was scheduled for LHC.      He has undergone cardiac catheterization which showed non-obstructive CAD with LV dysfunction with no intervention.     AICD implantation was discussed with him yesterday and he wants to proceed with procedure      Interval History:    Pt seen at bedside for follow up. Clinical notes and telemetry reviewed.  Pt denies SOB, palpitations, lightheadedness, edema, PND/orthopnea. Pain in his R side has improved.      Assessment/Plan:     VT  - S/p cardioversion en route to hospital for WCT  - No episodes of sustained VT on telemetry  - LHC with

## 2024-09-27 NOTE — PLAN OF CARE
Problem: Pain  Goal: Verbalizes/displays adequate comfort level or baseline comfort level  Outcome: Progressing     Problem: Chronic Conditions and Co-morbidities  Goal: Patient's chronic conditions and co-morbidity symptoms are monitored and maintained or improved  Outcome: Progressing     Problem: Safety - Adult  Goal: Free from fall injury  Outcome: Progressing     Problem: ABCDS Injury Assessment  Goal: Absence of physical injury  Outcome: Progressing     Problem: Respiratory - Adult  Goal: Achieves optimal ventilation and oxygenation  Outcome: Progressing     Problem: Cardiovascular - Adult  Goal: Maintains optimal cardiac output and hemodynamic stability  Outcome: Progressing  Goal: Absence of cardiac dysrhythmias or at baseline  Outcome: Progressing     Problem: Genitourinary - Adult  Goal: Absence of urinary retention  Outcome: Progressing  Goal: Urinary catheter remains patent  Outcome: Progressing     Problem: Metabolic/Fluid and Electrolytes - Adult  Goal: Electrolytes maintained within normal limits  Outcome: Progressing  Goal: Hemodynamic stability and optimal renal function maintained  Outcome: Progressing

## 2024-09-27 NOTE — PROGRESS NOTES
education, fluid restriction and daily weights  Will resume entresto half of a 24-26 mg at bedtime (he was on 24-26 mg twice a day prior to admission)    Discussed with bedside nurse  He follows with Dr SARAI Alford at [facility] on 10/4/2024    NYHA III    Discussed with patient who is agreeable with plan of care.     Thank you for allowing me to participate in the care of your patient.    NALLELY LEONARDO, APRN - CNS, CNS

## 2024-09-28 NOTE — PROGRESS NOTES
[facility]        PATIENT NAME: Jonathan Dove     TODAY'S DATE: 9/28/2024    CC: Abd pain  SUBJECTIVE:    Pt resting in bed, no complaints, confused but denies pain. No acute N/V.  Denies CP or SOB.  No fever.     Current Medications:   Current Facility-Administered Medications: amiodarone (CORDARONE) tablet 400 mg, 400 mg, Oral, Daily  sacubitril-valsartan (ENTRESTO) 24-26 MG per tablet 0.5 tablet, 0.5 tablet, Oral, QHS  lactobacillus (CULTURELLE) capsule 1 capsule, 1 capsule, Oral, BID WC  acetaminophen (TYLENOL) tablet 650 mg, 650 mg, Oral, Q4H PRN **OR** acetaminophen (TYLENOL) suppository 650 mg, 650 mg, Rectal, Q6H PRN  sodium chloride flush 0.9 % injection 5-40 mL, 5-40 mL, IntraVENous, 2 times per day  sodium chloride flush 0.9 % injection 5-40 mL, 5-40 mL, IntraVENous, PRN  0.9 % sodium chloride infusion, , IntraVENous, PRN  piperacillin-tazobactam (ZOSYN) 3,375 mg in sodium chloride 0.9 % 50 mL IVPB (mini-bag), 3,375 mg, IntraVENous, Q8H  benzocaine-menthol (CEPACOL SORE THROAT) lozenge 1 lozenge, 1 lozenge, Oral, Q2H PRN  morphine (PF) injection 2 mg, 2 mg, IntraVENous, Q4H PRN  phenylephrine (ARACELI-SYNEPHRINE) 50 mg in sodium chloride 0.9 % 250 mL infusion,  mcg/min, IntraVENous, Continuous  potassium chloride 20 mEq/50 mL IVPB (Central Line), 20 mEq, IntraVENous, PRN  pantoprazole (PROTONIX) injection 40 mg, 40 mg, IntraVENous, Daily  aspirin EC tablet 81 mg, 81 mg, Oral, Daily  [Held by provider] apixaban (ELIQUIS) tablet 5 mg, 5 mg, Oral, BID  lactated ringers IV soln infusion, , IntraVENous, Continuous  sodium chloride flush 0.9 % injection 5-40 mL, 5-40 mL, IntraVENous, 2 times per day  sodium chloride flush 0.9 % injection 5-40 mL, 5-40 mL, IntraVENous, PRN  0.9 % sodium chloride infusion, , IntraVENous, PRN  potassium chloride (KLOR-CON M) extended release tablet 40 mEq, 40 mEq, Oral, PRN **OR** potassium bicarb-citric acid (EFFER-K) effervescent tablet  EC tablet Take 1 tablet by mouth daily   Yes Provider, MD Robin   amitriptyline (ELAVIL) 50 MG tablet TAKE ONE TABLET BY MOUTH ONCE NIGHTLY  Patient not taking: Reported on 9/24/2024 12/15/23   Raphael Huntley Jr., MD        Allergies:  Antihistamine [diphenhydramine hcl]; Antihistamines, chlorpheniramine-type; Aspirin; and Diphenhydramine    Social History:    reports that he has never smoked. He has never used smokeless tobacco. He reports that he does not drink alcohol and does not use drugs.    Family History:        Problem Relation Age of Onset    Diabetes Mother     Heart Disease Mother     Stroke Brother     High Cholesterol Neg Hx     High Blood Pressure Neg Hx        REVIEW OF SYSTEMS:  CONSTITUTIONAL:  negative  HEENT:  negative  RESPIRATORY:  negative  CARDIOVASCULAR:  negative  GASTROINTESTINAL:  negative  GENITOURINARY:  negative  HEMATOLOGIC/LYMPHATIC:  negative  NEUROLOGICAL:  negative  SKIN: negative      OBJECTIVE:  VITALS:  /73   Pulse 99   Temp 98.7 °F (37.1 °C) (Axillary)   Resp 17   Ht 1.727 m (5' 8\")   Wt 80.1 kg (176 lb 9.4 oz)   SpO2 98%   BMI 26.85 kg/m²     INTAKE/OUTPUT:    I/O last 3 completed shifts:  In: 4594.4 [P.O.:530; I.V.:3819.5; IV Piggyback:244.9]  Out: 7550 [Urine:7550]  No intake/output data recorded.    CONSTITUTIONAL:  awake and alert, some rambling conversation  LUNGS:  clear to auscultation  ABDOMEN:  normal bowel sounds, soft, non-distended, non-tender         Data:  CBC:   Recent Labs     09/26/24  0425 09/27/24  0424 09/28/24  0403   WBC 14.0* 12.6* 10.6   HGB 13.0* 12.3* 13.1*   HCT 38.7* 36.3* 39.7*   * 111* 128*     BMP:    Recent Labs     09/26/24  0425 09/27/24  0424 09/28/24  0403   * 133* 136   K 3.5 3.9 3.2*    103 105   CO2 18* 20* 18*   BUN 21* 22* 18   CREATININE 1.3 1.2 1.2   GLUCOSE 104* 126* 101*     Hepatic:   Recent Labs     09/26/24  1242 09/27/24  0424 09/28/24  0400   AST 16 24 21   ALT 11 14 15   BILITOT 1.0

## 2024-09-28 NOTE — PROGRESS NOTES
[facility]   Progress Note  Cardiology    CC: admitted for VT - sustained     HPI: no cp/sob     Past Medical History   has a past medical history of Acute, but ill-defined, cerebrovascular disease, Cancer (HCC), Degeneration of cervical intervertebral disc, Degeneration of lumbar or lumbosacral intervertebral disc, Enlarged prostate, Irritable bowel syndrome, Proteinuria, Pure hypercholesterolemia, and Unspecified cerebral artery occlusion with cerebral infarction.    Past Surgical History   has a past surgical history that includes back surgery; Cardiac catheterization; Aortic mitral valve replacement; and Cardiac procedure (N/A, 9/24/2024).    Social History   reports that he has never smoked. He has never used smokeless tobacco. He reports that he does not drink alcohol and does not use drugs.    Family History  family history includes Diabetes in his mother; Heart Disease in his mother; Stroke in his brother.    Medications  Prior to Admission medications    Medication Sig Start Date End Date Taking? Authorizing Provider   sacubitril-valsartan (ENTRESTO) 24-26 MG per tablet Take 1 tablet by mouth 2 times daily   Yes ProviderRobin MD   lovastatin (MEVACOR) 20 MG tablet TAKE ONE TABLET BY MOUTH ONCE NIGHTLY  Patient taking differently: Take 1 tablet by mouth nightly 8/12/24  Yes Raphael Huntley Jr., MD   dapagliflozin (FARXIGA) 10 MG tablet Take 1 tablet by mouth every morning 1/19/24  Yes ProviderRobin MD   apixaban (ELIQUIS) 2.5 MG TABS tablet Take 1 tablet by mouth 2 times daily   Yes ProviderRobin MD   betamethasone valerate (VALISONE) 0.1 % cream APPLY TOPICALLY TWO TIMES A DAY  Patient taking differently: as needed As needed 10/13/21  Yes Raphael Huntley Jr., MD   metoprolol succinate (TOPROL XL) 25 MG extended release tablet TAKE ONE TABLET BY MOUTH DAILY  Patient taking differently: Take 1 tablet by mouth daily 9/8/20  Yes Raphael Huntley Jr., MD    Netarsudil-Latanoprost (ROCKLATAN) 0.02-0.005 % SOLN Place 1 drop into both eyes every evening   Yes Robin Bhandari MD   tamsulosin (FLOMAX) 0.4 MG capsule Take 1 capsule by mouth daily   Yes ProviderRobin MD   MULTIPLE VITAMIN PO Take 1 tablet by mouth daily    Yes Robin Bhandari MD   Cholecalciferol (VITAMIN D3) 1.25 MG (06281 UT) TABS Take 1 capsule by mouth daily    Yes Robin Bhandari MD   polyethylene glycol (GLYCOLAX) packet Take 1 packet by mouth every 48 hours as needed   Yes Provider, MD Robin   aspirin 81 MG EC tablet Take 1 tablet by mouth daily   Yes Robin Bhandari MD   amitriptyline (ELAVIL) 50 MG tablet TAKE ONE TABLET BY MOUTH ONCE NIGHTLY  Patient not taking: Reported on 9/24/2024 12/15/23   Raphael Huntley Jr., MD       Allergies  Antihistamine [diphenhydramine hcl]; Antihistamines, chlorpheniramine-type; Aspirin; and Diphenhydramine    Review of Systems:   Reviewed. No changes except as noted in HPI and A/P      Lab Results   Component Value Date    WBC 10.6 09/28/2024    HGB 13.1 (L) 09/28/2024    HCT 39.7 (L) 09/28/2024    MCV 93.7 09/28/2024     (L) 09/28/2024     Lab Results   Component Value Date    CREATININE 1.2 09/28/2024    BUN 18 09/28/2024     09/28/2024    K 3.2 (L) 09/28/2024     09/28/2024    CO2 18 (L) 09/28/2024     Lab Results   Component Value Date    INR 1.1 09/20/2024    PROTIME 10.5 01/27/2017       I reviewed EKGs and radiology imaging. Pertinent findings and changes as described in assessment below.      Physical Examination:    /64   Pulse 96   Temp 98.5 °F (36.9 °C) (Axillary)   Resp 17   Ht 1.727 m (5' 8\")   Wt 80.1 kg (176 lb 9.4 oz)   SpO2 95%   BMI 26.85 kg/m²      General Appearance:  Alert, no distress, appears stated age   Head:  Normocephalic, without obvious abnormality, atraumatic   Eyes:  PERRL, conjunctiva/corneas clear         Nose: Nares normal, no drainage or sinus tenderness

## 2024-09-28 NOTE — PROGRESS NOTES
Urology Progress Note  [facility]     Patient: Jonathan Dove MRN: [id number]  Room/Bed: [facility]-2912/2912-01   YOB: 1940  Age/Sex: 84 y.o.male  Admission Date: 9/24/2024     Date of Service:  9/28/2024    ASSESSMENT/PLAN     1. Ventricular tachycardia (HCC)    2. STEMI (ST elevation myocardial infarction) (Formerly [facility])    3. Syncope and collapse    4. Acute encephalopathy    5. Septicemia (Formerly [facility])    6. Hypokalemia    7. V tach (HCC)      84yoM w/ hx of CHF, Chronic Atrial Flutter, Prostetic Mitral valve, CAD s/p CABG. Presented to [facility] on 9/24 in V-tach requiring cardioversion & intubation. Extubated 9/25. Question of possible acute cholecystitis. Urology consulted after amaris blood noted in catheter bag, staff unable to irrigate. Currently on Eliquis and ASA for DVT prevention.     Pt and imaging reviewed with Dr. Krishnamurthy. Likely source of bleeding d/t traumatic sweeney placement through large prostate while on AC. Minimal clot seen on CT urogram.  Has 24f 3 way catheter in place, on CBI.     -----------------------------------------------------------------------------    Urine this morning is jonas in color with CBI ~60% clamped. RN reports that urine was cherry colored this morning and CBI was restarted because of this. Very small clots reported in tubing. No retention concerns. Pt is pleasantly confused. Belly is soft.    Afebrile. Cr 1.2. WBC WNL. Hgb up to 13.1. Plt 128.    Recommendations:    - monitor H&H - stable  - CBI clamped - discussed with RN to restart CBI if urine becomes strawberry red or darker, will continue to work on weaning CBI  - Hold AC, will plan to resume AC when urine a clear yellow x24-48 hours.   - continue sweeney, this can be removed prior to d/c once pt is medically stable  - will continue to follow along with you    All patient questions were answered. He understands the plan as listed above.    SUBJECTIVE     Chief Complaint:   Chief Complaint   Patient presents   09/28/2024 04:03 AM    MPV 9.7 09/28/2024 04:03 AM     BMP   Lab Results   Component Value Date/Time     09/28/2024 04:03 AM    K 3.2 09/28/2024 04:03 AM     09/28/2024 04:03 AM    CO2 18 09/28/2024 04:03 AM    BUN 18 09/28/2024 04:03 AM    CREATININE 1.2 09/28/2024 04:03 AM    GLUCOSE 101 09/28/2024 04:03 AM    CALCIUM 8.0 09/28/2024 04:03 AM     Urinalysis:   Lab Results   Component Value Date/Time    COLORU DARK YELLOW 09/24/2024 08:07 PM    GLUCOSEU >=1000 09/24/2024 08:07 PM    BLOODU SMALL 09/24/2024 08:07 PM    NITRU Negative 09/24/2024 08:07 PM    LEUKOCYTESUR Negative 09/24/2024 08:07 PM     PSA:   Lab Results   Component Value Date    PSA 4.8 (H) 03/21/2023    PSA 4.8 (H) 09/01/2022    PSA 3.6 11/23/2018        Imaging:    CT UROGRAM   Final Result   1. Marked prostatomegaly with median lobe hypertrophy. Indeterminate soft   tissue density filling defects are seen within the bladder on either side of   the prostatic median lobe. Recommend correlation with cystoscopy.   2. Moderate gallbladder distension with associated wall thickening and   pericholecystic fluid. Cholelithiasis, including within the cystic duct.   Findings are suspicious for acute cholecystitis.   3. Consolidative opacities in the left lower lobe are suspicious for   pneumonia.   4. Small right pleural effusion and adjacent right lower lobe atelectasis.                  Electronically signed by: Zeina Pearson PA-C, 9/28/2024 11:52 AM  [facility]  Office Contact: [phone]

## 2024-09-28 NOTE — PROGRESS NOTES
Spoke with urology regarding amaris red bloody output from catheter- plan to restart CBI, care ongoing

## 2024-09-28 NOTE — PROGRESS NOTES
Hospitalist Progress Note    Name:  Jonathan Dove    /Age/Sex: 1940  (84 y.o. male)  MRN & CSN:  [id number] & [id number]    PCP: Raphael Huntley Jr., MD    Date of Admission: 2024    Patient Status:  Inpatient     Chief Complaint:   Chief Complaint   Patient presents with    Irregular Heart Beat     Pt from home via [facility] for unstable V-tach, per EMS pt was in vtach but was alert and oriented with SBP in 80s. EMS gave 5 mg versed en route and cardioverted to NSR with PVCs, pt being bagged upon arrival, pulse still present       Hospital Course:   Patient admitted for V tach. Was shocked 1x on admission. Was intubated for cardioversion. Cardiology consulted. CCM consulted.    Extubated on .    Subjective:  Today is:  Hospital Day: 5.  Patient seen and examined in CVU-/2912.     Lying in bed.   Very confused, but no agitated.   Thinks he is in an apartment.    Continues to have bloody urine, has been off AC.wbc normalized, hb 13      Medications:  Reviewed    Infusion Medications    sodium chloride      phenylephrine (ARACELI-SYNEPHRINE) 50 mg in sodium chloride 0.9 % 250 mL infusion Stopped (24 1725)    lactated ringers IV soln 100 mL/hr at 24 0557    sodium chloride 5 mL/hr at 24 0557     Scheduled Medications    amiodarone  400 mg Oral Daily    sacubitril-valsartan  0.5 tablet Oral QHS    lactobacillus  1 capsule Oral BID WC    sodium chloride flush  5-40 mL IntraVENous 2 times per day    piperacillin-tazobactam  3,375 mg IntraVENous Q8H    pantoprazole  40 mg IntraVENous Daily    aspirin  81 mg Oral Daily    [Held by provider] apixaban  5 mg Oral BID    sodium chloride flush  5-40 mL IntraVENous 2 times per day     PRN Meds: acetaminophen **OR** acetaminophen, sodium chloride flush, sodium chloride, benzocaine-menthol, morphine, potassium chloride, sodium chloride flush, sodium chloride, potassium chloride **OR** potassium alternative oral replacement  Date/Time    NITRU Negative 09/24/2024 08:07 PM    WBCUA 1 09/24/2024 08:07 PM    BACTERIA None Seen 09/24/2024 08:07 PM    RBCUA 28 09/24/2024 08:07 PM    BLOODU SMALL 09/24/2024 08:07 PM    GLUCOSEU >=1000 09/24/2024 08:07 PM       Radiology:  XR CHEST PORTABLE   Final Result   Persistent right basilar airspace opacification with small bilateral pleural   effusions.         US ABDOMEN LIMITED Specify organ? GALLBLADDER   Final Result   Cholelithiasis with acute cholecystitis.         CT UROGRAM   Final Result   1. Marked prostatomegaly with median lobe hypertrophy. Indeterminate soft   tissue density filling defects are seen within the bladder on either side of   the prostatic median lobe. Recommend correlation with cystoscopy.   2. Moderate gallbladder distension with associated wall thickening and   pericholecystic fluid. Cholelithiasis, including within the cystic duct.   Findings are suspicious for acute cholecystitis.   3. Consolidative opacities in the left lower lobe are suspicious for   pneumonia.   4. Small right pleural effusion and adjacent right lower lobe atelectasis.         XR CHEST PORTABLE   Final Result   Mild bibasilar opacities are favored to represent atelectasis, less likely   infection.         XR CHEST PORTABLE   Final Result   New right central line in place.  No pneumothorax or new airspace disease   identified.         CT HEAD WO CONTRAST   Final Result   No acute intracranial abnormality.      Mild generalized cerebral volume loss and moderate chronic microvascular   ischemic changes of the periventricular and subcortical white matter.         XR CHEST PORTABLE   Final Result   1. Endotracheal tube is noted with its tip approximately 3.8 cm from the   yunior.   2. Small left pleural effusion and/or basilar atelectasis.                 Assessment/Plan:    Active Hospital Problems    Diagnosis     Gross hematuria [R31.0]     Acute cholecystitis due to biliary calculus [K80.00]     On

## 2024-09-29 PROBLEM — K81.0 ACUTE CHOLECYSTITIS: Status: ACTIVE | Noted: 2024-09-29

## 2024-09-29 NOTE — PLAN OF CARE
Problem: Pain  Goal: Verbalizes/displays adequate comfort level or baseline comfort level  Outcome: Progressing     Problem: Chronic Conditions and Co-morbidities  Goal: Patient's chronic conditions and co-morbidity symptoms are monitored and maintained or improved  Outcome: Progressing  Flowsheets (Taken 9/28/2024 1542 by Lizette Hernandez, RN)  Care Plan - Patient's Chronic Conditions and Co-Morbidity Symptoms are Monitored and Maintained or Improved: Monitor and assess patient's chronic conditions and comorbid symptoms for stability, deterioration, or improvement     Problem: Safety - Adult  Goal: Free from fall injury  Outcome: Progressing     Problem: ABCDS Injury Assessment  Goal: Absence of physical injury  Outcome: Progressing     Problem: Respiratory - Adult  Goal: Achieves optimal ventilation and oxygenation  Outcome: Progressing     Problem: Cardiovascular - Adult  Goal: Maintains optimal cardiac output and hemodynamic stability  Outcome: Progressing  Goal: Absence of cardiac dysrhythmias or at baseline  Outcome: Progressing     Problem: Genitourinary - Adult  Goal: Absence of urinary retention  Outcome: Progressing  Flowsheets (Taken 9/28/2024 1542 by Lizette Hernandez, RN)  Absence of urinary retention: Monitor intake/output and perform bladder scan as needed  Goal: Urinary catheter remains patent  Outcome: Progressing  Flowsheets (Taken 9/28/2024 1542 by Lizette Hernandez, RN)  Urinary catheter remains patent: Assess patency of urinary catheter     Problem: Metabolic/Fluid and Electrolytes - Adult  Goal: Electrolytes maintained within normal limits  Outcome: Progressing  Flowsheets (Taken 9/28/2024 1542 by Lizette Hernandez, RN)  Electrolytes maintained within normal limits:   Monitor labs and assess patient for signs and symptoms of electrolyte imbalances   Administer electrolyte replacement as ordered  Goal: Hemodynamic stability and optimal renal function maintained  Outcome: Progressing  Flowsheets

## 2024-09-29 NOTE — PROGRESS NOTES
Called into room by sitter/.  Pt became acutely confused and aggressive with staff.  Pt actively removing cardiac leads, and trying to pull out central line and sweeney catheter.  Pt not redirectable.  Notified Hospitalist.  Gave 1 time dose of ativan per hospitalist  and restarted precedex gtt.  Pt calmed down and stopped pulling at equipment.  Sitter continued at bedside.

## 2024-09-29 NOTE — PROGRESS NOTES
Urology Progress Note  [facility]     Patient: Jonathan Dove MRN: [id number]  Room/Bed: [facility]2912/2912-01   YOB: 1940  Age/Sex: 84 y.o.male  Admission Date: 9/24/2024     Date of Service:  9/29/2024    ASSESSMENT/PLAN     1. Ventricular tachycardia (HCC)    2. STEMI (ST elevation myocardial infarction) ([facility])    3. Syncope and collapse    4. Acute encephalopathy    5. Septicemia ([facility])    6. Hypokalemia    7. V tach (HCC)      84yoM w/ hx of CHF, Chronic Atrial Flutter, Prostetic Mitral valve, CAD s/p CABG. Presented to [facility] on 9/24 in V-tach requiring cardioversion & intubation. Extubated 9/25. Question of possible acute cholecystitis. Urology consulted after amaris blood noted in catheter bag, staff unable to irrigate. On Eliquis and ASA for DVT prevention.     Pt and imaging reviewed with Dr. Krishnamurthy. Likely source of bleeding d/t traumatic sweeney placement through large prostate while on AC. Minimal clot seen on CT urogram.  Has 24f 3 way catheter in place, on CBI.     -----------------------------------------------------------------------------    Urine is pale peach in color w/ CBI 70% clamped. Discussed with RN. Pt w/ ongoing agitation and confusion. Has been grabbing at his catheter. Has sitter at present. Urine was burgundy this morning when CBI was turned off.     Afebrile. Cr 1. WBC WNL. Hgb 12.4. Plt 128.    Recommendations:  - monitor H&H - stable  - concern pt may be causing recurrent trauma and tugging at catheter, continue sitter, mgmt of agitation per IM  - CBI weaned and clamped  ~70%.   - discussed with RN to taper CBI further. Will titrate down so that urine is light pink to clear.   - Hold AC, will plan to resume AC when urine a clear yellow x24-48 hours.   - continue sweeney, this can be removed prior to d/c once pt is medically stable  - will continue to follow along with you    All patient questions were answered. He understands the plan as listed  above.    SUBJECTIVE     Chief Complaint:   Chief Complaint   Patient presents with    Irregular Heart Beat     Pt from home via [facility] for unstable V-tach, per EMS pt was in vtach but was alert and oriented with SBP in 80s. EMS gave 5 mg versed en route and cardioverted to NSR with PVCs, pt being bagged upon arrival, pulse still present       24 Hour Events: Pt is a poor historian. He denies any belly pain or pressure. RN reports urine was burgundy in catheter tubing with CBI clamped earlier this morning. CBI is about 70% clamped and urine is pale peach in colore. Ongoing agitation. Requiring sitter and redirection.    OBJECTIVE     Hospital Problem List:  Principal Problem:    V tach ([facility])  Active Problems:    Mixed hyperlipidemia    CAD (coronary artery disease)    Essential hypertension    CKD (chronic kidney disease), stage III ([facility])    On mechanically assisted ventilation (HCC)    Chronic atrial flutter (HCC)    Chronic systolic congestive heart failure ([facility])    Acute respiratory failure with hypoxia    Somnolence    Gross hematuria    Acute cholecystitis due to biliary calculus  Resolved Problems:    * No resolved hospital problems. *      Physical Exam:  Vitals:    09/29/24 1000   BP: 94/65   Pulse: 81   Resp:    Temp:    SpO2: 95%     GEN: Pleasantly confused elderly  male resting in bed, intermittently agitated and grabbing at catheter, sitter at bedside, pt able to be re-directed  NEURO/PSYCH: Oriented to self  RESP: Normal respirations, no audible wheezing, on RA   ABDOMEN: Abdomen soft, non-tender, non-distended.  GENITOURINARY: sweeney draining pale peach colored urine w/ CBI running ~ 70% clamped  NEURO: No gross motor abn    Ins/Outs:    Intake/Output Summary (Last 24 hours) at 9/29/2024 1115  Last data filed at 9/29/2024 1053  Gross per 24 hour   Intake 2795.23 ml   Output 4500 ml   Net -1704.77 ml       Labs:  CBC   Lab Results   Component Value Date/Time    WBC 8.4 09/29/2024 05:00

## 2024-09-29 NOTE — PROGRESS NOTES
[facility]        PATIENT NAME: Jonathan Dove     TODAY'S DATE: 9/29/2024    CC: Abd pain    SUBJECTIVE:    Pt resting in bed, no complaints, confused per care givers, does give appropriate answers to me.  Denies pain. No acute N/V.  Denies CP or SOB.  No fever.     Current Medications:   Current Facility-Administered Medications: dexmedeTOMIDine (PRECEDEX) 400 mcg in sodium chloride 0.9 % 100 mL infusion, 0.1-1.5 mcg/kg/hr, IntraVENous, Continuous  potassium chloride (KLOR-CON M) extended release tablet 20 mEq, 20 mEq, Oral, TID WC  carvedilol (COREG) tablet 6.25 mg, 6.25 mg, Oral, BID WC  amiodarone (CORDARONE) tablet 400 mg, 400 mg, Oral, Daily  sacubitril-valsartan (ENTRESTO) 24-26 MG per tablet 0.5 tablet, 0.5 tablet, Oral, QHS  lactobacillus (CULTURELLE) capsule 1 capsule, 1 capsule, Oral, BID   acetaminophen (TYLENOL) tablet 650 mg, 650 mg, Oral, Q4H PRN **OR** acetaminophen (TYLENOL) suppository 650 mg, 650 mg, Rectal, Q6H PRN  sodium chloride flush 0.9 % injection 5-40 mL, 5-40 mL, IntraVENous, 2 times per day  sodium chloride flush 0.9 % injection 5-40 mL, 5-40 mL, IntraVENous, PRN  0.9 % sodium chloride infusion, , IntraVENous, PRN  piperacillin-tazobactam (ZOSYN) 3,375 mg in sodium chloride 0.9 % 50 mL IVPB (mini-bag), 3,375 mg, IntraVENous, Q8H  benzocaine-menthol (CEPACOL SORE THROAT) lozenge 1 lozenge, 1 lozenge, Oral, Q2H PRN  morphine (PF) injection 2 mg, 2 mg, IntraVENous, Q4H PRN  phenylephrine (ARACELI-SYNEPHRINE) 50 mg in sodium chloride 0.9 % 250 mL infusion,  mcg/min, IntraVENous, Continuous  potassium chloride 20 mEq/50 mL IVPB (Central Line), 20 mEq, IntraVENous, PRN  pantoprazole (PROTONIX) injection 40 mg, 40 mg, IntraVENous, Daily  aspirin EC tablet 81 mg, 81 mg, Oral, Daily  [Held by provider] apixaban (ELIQUIS) tablet 5 mg, 5 mg, Oral, BID  lactated ringers IV soln infusion, , IntraVENous, Continuous  sodium chloride flush 0.9 % injection 5-40  VITAMIN PO Take 1 tablet by mouth daily    Yes ProviderRobin MD   Cholecalciferol (VITAMIN D3) 1.25 MG (73345 UT) TABS Take 1 capsule by mouth daily    Yes Robin Bhandari MD   polyethylene glycol (GLYCOLAX) packet Take 1 packet by mouth every 48 hours as needed   Yes ProviderRobin MD   aspirin 81 MG EC tablet Take 1 tablet by mouth daily   Yes Robin Bhandari MD   amitriptyline (ELAVIL) 50 MG tablet TAKE ONE TABLET BY MOUTH ONCE NIGHTLY  Patient not taking: Reported on 9/24/2024 12/15/23   Raphael Huntley Jr., MD        Allergies:  Antihistamine [diphenhydramine hcl]; Antihistamines, chlorpheniramine-type; Aspirin; and Diphenhydramine    Social History:    reports that he has never smoked. He has never used smokeless tobacco. He reports that he does not drink alcohol and does not use drugs.    Family History:        Problem Relation Age of Onset    Diabetes Mother     Heart Disease Mother     Stroke Brother     High Cholesterol Neg Hx     High Blood Pressure Neg Hx        REVIEW OF SYSTEMS:  CONSTITUTIONAL:  negative  HEENT:  negative  RESPIRATORY:  negative  CARDIOVASCULAR:  negative  GASTROINTESTINAL:  negative  GENITOURINARY:  negative  HEMATOLOGIC/LYMPHATIC:  negative  NEUROLOGICAL:  negative  SKIN: negative      OBJECTIVE:  VITALS:  /89   Pulse 59   Temp 98.7 °F (37.1 °C) (Temporal)   Resp 16   Ht 1.727 m (5' 8\")   Wt 79.2 kg (174 lb 9.7 oz)   SpO2 98%   BMI 26.55 kg/m²     INTAKE/OUTPUT:    I/O last 3 completed shifts:  In: 5173.2 [P.O.:300; I.V.:4533.5; IV Piggyback:339.7]  Out: 6050 [Urine:6050]  I/O this shift:  In: 743.9 [P.O.:480; I.V.:263.9]  Out: 1000 [Urine:1000]    CONSTITUTIONAL:  awake and alert, some rambling conversation  LUNGS:  clear to auscultation  HEART: RRR  ABDOMEN:  normal bowel sounds, soft, non-distended, non-tender         Data:  CBC:   Recent Labs     09/27/24  0424 09/28/24  0403 09/29/24  0500   WBC 12.6* 10.6 8.4   HGB 12.3* 13.1* 12.4*

## 2024-09-29 NOTE — PROGRESS NOTES
Hospitalist Progress Note    Name:  Jonathan Dove    /Age/Sex: 1940  (84 y.o. male)  MRN & CSN:  [id number] & [id number]    PCP: Raphael Huntley Jr., MD    Date of Admission: 2024    Patient Status:  Inpatient     Chief Complaint:   Chief Complaint   Patient presents with    Irregular Heart Beat     Pt from home via [facility] for unstable V-tach, per EMS pt was in vtach but was alert and oriented with SBP in 80s. EMS gave 5 mg versed en route and cardioverted to NSR with PVCs, pt being bagged upon arrival, pulse still present       Hospital Course:   Patient admitted for V tach. Was shocked 1x on admission. Was intubated for cardioversion, now extubated.  Sent for cath nonobstructive, has low EF 30 to 35%.  Also found to have acute cholecystitis but improving with conservative management, patient high risk for surgery and now plan for that.  Will need ICD, but currently being deferred until EP  can assess, should be low risk from infectious standpoint  Has been confused/delirious and on Precedex    Subjective:  Today is:  Hospital Day: 6.  Patient seen and examined in CVU-/2912.     Lying in bed.   Very confused,restless , trying to pull tubes and lines and get out of bed .  Urine less bloody today    Medications:  Reviewed    Infusion Medications    dexmedeTOMIDine 1 mcg/kg/hr (24 1053)    sodium chloride      phenylephrine (ARACELI-SYNEPHRINE) 50 mg in sodium chloride 0.9 % 250 mL infusion Stopped (24 1725)    lactated ringers IV soln 100 mL/hr at 24 1009    sodium chloride 5 mL/hr at 24 1009     Scheduled Medications    potassium chloride  20 mEq Oral TID WC    magnesium sulfate  2,000 mg IntraVENous Once    LORazepam  0.5 mg IntraVENous Once    carvedilol  6.25 mg Oral BID WC    amiodarone  400 mg Oral Daily    sacubitril-valsartan  0.5 tablet Oral QHS    lactobacillus  1 capsule Oral BID WC    sodium chloride flush  5-40 mL IntraVENous 2 times per day

## 2024-09-29 NOTE — PROGRESS NOTES
[facility]   Cardiology Progress Note     Date: 9/29/2024  Admit Date: 9/24/2024     Reason for consultation:     Chief Complaint   Patient presents with    Irregular Heart Beat     Pt from home via [facility] for unstable V-tach, per EMS pt was in vtach but was alert and oriented with SBP in 80s. EMS gave 5 mg versed en route and cardioverted to NSR with PVCs, pt being bagged upon arrival, pulse still present       History of Present Illness: History obtained from patient and medical record.     Jonathan Dove is a 84 y.o. male admitted after found down and to be in sustained VT at home.  EMS states talking upon arrival and initially refused transfer.  Monitor connected and found to be in sustained VT.  Shocked by squad into sinus with 1st AVB and RBBB.  Intubated and sedated for airway protection.  Labs with Hypokalemia 3.4, ARF 1.6, lactic acidosis 2.8, wbc 14.8, plt 101.  CT head pending.  Initial bp 83/51, now 118/79.  Rohit and amio started in ER.   (per consult note)    Interval Hx: Today, per nursing he was becoming increasingly agitated pulling at sweeney. He was started on precedex. Disoriented at time of visit.      Patient seen and examined. Clinical notes reviewed. Telemetry reviewed.      Past Medical History:  Past Medical History:   Diagnosis Date    Acute, but ill-defined, cerebrovascular disease     Cancer (HCC)     skin cancer of scalp - basal    Degeneration of cervical intervertebral disc     Degeneration of lumbar or lumbosacral intervertebral disc     Enlarged prostate     Irritable bowel syndrome     Proteinuria     Pure hypercholesterolemia     Unspecified cerebral artery occlusion with cerebral infarction 2011    bleed         Past Surgical History:    has a past surgical history that includes back surgery; Cardiac catheterization; Aortic mitral valve replacement; and Cardiac procedure (N/A, 9/24/2024).     Social History:  Reviewed.  reports that he has never smoked. He has  supine position.     CXR 9/25/2024  R central line in place, no pneumothorax or new airspace disease    Problem List:   Patient Active Problem List    Diagnosis Date Noted    Gross hematuria 09/26/2024    Acute cholecystitis due to biliary calculus 09/26/2024    On mechanically assisted ventilation (HCC) 09/25/2024    Chronic atrial flutter (HCC) 09/25/2024    Chronic systolic congestive heart failure (HCC) 09/25/2024    Acute respiratory failure with hypoxia 09/25/2024    Somnolence 09/25/2024    V tach (HCC) 09/24/2024    Ventricular tachycardia (HCC) 09/24/2024    History of stroke 03/29/2024    CKD (chronic kidney disease), stage III ([facility]) 01/04/2019    Allergic rhinitis 07/28/2017    Lower abdominal pain 02/08/2017    Cardiomyopathy (HCC) 06/30/2015    Essential hypertension 06/30/2015    CAD (coronary artery disease) 02/04/2015    LV dysfunction 01/23/2015    PVC (premature ventricular contraction) 01/23/2015    Mitral insufficiency 01/23/2015    Aortic insufficiency 01/23/2015    Cardiac arrhythmia 01/06/2015    Insomnia 08/04/2014    GERD (gastroesophageal reflux disease) 04/10/2014    BPH (benign prostatic hyperplasia) 10/10/2013    Cancer ([facility])     Cerebral artery occlusion with cerebral infarction ([facility])     Mixed hyperlipidemia     Degeneration of lumbar or lumbosacral intervertebral disc     Irritable bowel syndrome     Acute, but ill-defined, cerebrovascular disease     Degeneration of cervical intervertebral disc     Migraine         Assessment and Plan:     V tach - no recurrence   Nonobstructive CAD - stable  NICMP, EF 30-35%, stable. On ARNI and coreg.   Mixed hyperlipidemia  Essential hypertension - stable  CKD   Chronic atrial flutter- MALLORY clip   Chronic systolic congestive heart failure   Acute cholecystitis  Bio AVR and MVR  Hematuria- urology following      Plan   Amio PO  Per gen surgery planning non operative treatment at this point   ICD when stable from infectious standpoint, EP to see

## 2024-09-30 ENCOUNTER — ANESTHESIA EVENT (OUTPATIENT)
Dept: OPERATING ROOM | Age: 84
End: 2024-09-30
Payer: MEDICARE

## 2024-09-30 NOTE — PROGRESS NOTES
Urology Progress Note  [facility]     Patient: Jonathan Dove MRN: [id number]  Room/Bed: [facility]2912/2912-01   YOB: 1940  Age/Sex: 84 y.o.male  Admission Date: 9/24/2024     Date of Service:  9/30/2024    ASSESSMENT/PLAN     1. Ventricular tachycardia (HCC)    2. STEMI (ST elevation myocardial infarction) ([facility])    3. Syncope and collapse    4. Acute encephalopathy    5. Septicemia ([facility])    6. Hypokalemia    7. V tach (HCC)      84yoM w/ hx of CHF, Chronic Atrial Flutter, Prostetic Mitral valve, CAD s/p CABG. Presented to [facility] on 9/24 in V-tach requiring cardioversion & intubation. Extubated 9/25. Question of possible acute cholecystitis. Urology consulted after amaris blood noted in catheter bag, staff unable to irrigate. On Eliquis and ASA for DVT prevention.     Pt and imaging reviewed with Dr. Krishnamurthy. Likely source of bleeding d/t traumatic sweeney placement through large prostate while on AC. Minimal clot seen on CT urogram.  Has 24f 3 way catheter in place, on CBI.     -----------------------------------------------------------------------------    Urine is pale peach in color w/ CBI 70% clamped. Discussed with RN. Pt w/ ongoing agitation and confusion. Has been grabbing at his catheter. Has sitter at present. Urine was burgundy this morning when CBI was turned off.     Afebrile. Cr 1. WBC WNL. Hgb 12.4. Plt 128.    Recommendations:  - monitor H&H - stable  - concern pt may be causing recurrent trauma and tugging at catheter, continue sitter, mgmt of agitation per IM  - CBI weaned and clamped  ~70%.   - discussed with RN to taper CBI further. Will titrate down so that urine is light pink  - Hold AC, will plan to resume AC when urine a clear yellow x24-48 hours.   - continue sweeney, this can be removed prior to d/c once pt is medically stable  - will continue to follow along with you    All patient questions were answered. He understands the plan as listed above.    SUBJECTIVE      Chief Complaint:   Chief Complaint   Patient presents with    Irregular Heart Beat     Pt from home via [facility] for unstable V-tach, per EMS pt was in vtach but was alert and oriented with SBP in 80s. EMS gave 5 mg versed en route and cardioverted to NSR with PVCs, pt being bagged upon arrival, pulse still present       24 Hour Events: Pt is a poor historian. He denies any belly pain or pressure. RN reports urine was burgundy in catheter tubing with CBI clamped earlier this morning. CBI is about 70% clamped and urine is pale peach in colore. Ongoing agitation. Requiring sitter and redirection.    OBJECTIVE     Hospital Problem List:  Principal Problem:    V tach (HCC)  Active Problems:    Mixed hyperlipidemia    CAD (coronary artery disease)    Essential hypertension    CKD (chronic kidney disease), stage III (HCC)    On mechanically assisted ventilation (HCC)    Chronic atrial flutter (HCC)    Chronic systolic congestive heart failure (HCC)    Acute respiratory failure with hypoxia    Somnolence    Gross hematuria    Acute cholecystitis due to biliary calculus    Acute cholecystitis  Resolved Problems:    * No resolved hospital problems. *      Physical Exam:  Vitals:    09/30/24 0800   BP: (!) 88/60   Pulse: 55   Resp:    Temp:    SpO2: (!) 88%     GEN: Pleasantly confused elderly  male resting in bed, intermittently agitated and grabbing at catheter, sitter at bedside, pt able to be re-directed  NEURO/PSYCH: Oriented to self  RESP: Normal respirations, no audible wheezing, on RA   ABDOMEN: Abdomen soft, non-tender, non-distended.  GENITOURINARY: sweeney draining pale peach colored urine w/ CBI running ~ 70% clamped  NEURO: No gross motor abn    Ins/Outs:    Intake/Output Summary (Last 24 hours) at 9/30/2024 0934  Last data filed at 9/30/2024 0734  Gross per 24 hour   Intake 2060.44 ml   Output 7400 ml   Net -5339.56 ml       Labs:  CBC   Lab Results   Component Value Date/Time    WBC 8.1

## 2024-09-30 NOTE — PROGRESS NOTES
Occupational and Physical Therapy  Jonathan Dove    Patient on hold per nurse due to increased confusion and agitation.   Patient currently on precedex,.     Will attempt again as schedule allows.    Thank you,  Elvi Wooten, OTR/L [id number]  Inna Moody PT, DPT [id number]

## 2024-09-30 NOTE — PROGRESS NOTES
[facility] [place]   Electrophysiology   Date: 9/30/2024  Reason for Follow up: VT   Chief Complaint   Patient presents with    Irregular Heart Beat     Pt from home via [facility] for unstable V-tach, per EMS pt was in vtach but was alert and oriented with SBP in 80s. EMS gave 5 mg versed en route and cardioverted to NSR with PVCs, pt being bagged upon arrival, pulse still present       HPI: Jonathan Dove is a 84 y.o. male with h/o atrial fibrillation/flutter, PVCs, HLD, CVA, mild to moderate CAD.    S/p MVR (31 mm Magna Ease), AVR (25 mm Inspiris), and MALLORY clip in January 2020 at [facility].    Pt was brought to the hospital by EMS after he was found to have wide-complex tachycardia/VT and hypotension and had to be cardioverted en route to the hospital.  Patient has been intubated in the emergency room and has been admitted to the CVU.    He also has history of PVC in the past treated with Amiodarone and declined ablation.     He has been seen for Afib/flutter and had cardioversion at [facility]. Saw EP at [facility] and declined ablation and treated with amiodarone and and had cardioversion.      His ejection fraction got worse over time to 25-30% and Entresto / Farxiga was added and he was scheduled for LHC at [facility]. AICD was discussed with him at [facility].      Family states that he was not feeling well for the past few weeks and he was scheduled for LHC.      He has undergone cardiac catheterization which showed non-obstructive CAD with LV dysfunction with no intervention.     AICD implantation was discussed with him and he wants to proceed with procedure.    On 9/27 pt had imaging positive for cholelithiasis with cholecystitis, he has been on IV antibiotics.    Had mental status change over the weekend and required Precedex drip, has now improved.      Interval History:    Pt seen at bedside for follow up. Clinical notes and telemetry reviewed.  Pt denies SOB, palpitations, lightheadedness, edema,  piperacillin-tazobactam  3,375 mg IntraVENous Q8H    pantoprazole  40 mg IntraVENous Daily    aspirin  81 mg Oral Daily    [Held by provider] apixaban  5 mg Oral BID    sodium chloride flush  5-40 mL IntraVENous 2 times per day     Continuous Infusions:   dexmedeTOMIDine 0.4 mcg/kg/hr (09/30/24 0624)    sodium chloride      phenylephrine (ARACELI-SYNEPHRINE) 50 mg in sodium chloride 0.9 % 250 mL infusion Stopped (09/26/24 1725)    lactated ringers IV soln 25 mL/hr at 09/30/24 0624    sodium chloride 5 mL/hr at 09/29/24 1009     PRN Meds:.acetaminophen **OR** acetaminophen, sodium chloride flush, sodium chloride, benzocaine-menthol, morphine, potassium chloride, sodium chloride flush, sodium chloride, potassium chloride **OR** potassium alternative oral replacement **OR** potassium chloride, magnesium sulfate, ondansetron **OR** ondansetron, polyethylene glycol   Allergies   Allergen Reactions    Antihistamine [Diphenhydramine Hcl]      Unable to urinate due to enlarged prostate    Antihistamines, Chlorpheniramine-Type     Aspirin Other (See Comments)     325mg only.  Hematuria.     Diphenhydramine        Dane Reyes PA-C  [facility]  [phone]    All questions and concerns were addressed to the patient/family. Alternatives to my treatment were discussed. I have discussed the above stated plan and the patient verbalized understanding and agreed with the plan.

## 2024-09-30 NOTE — PLAN OF CARE
Problem: Safety - Medical Restraint  Goal: Remains free of injury from restraints (Restraint for Interference with Medical Device)  Description: INTERVENTIONS:  1. Determine that other, less restrictive measures have been tried or would not be effective before applying the restraint  2. Evaluate the patient's condition at the time of restraint application  3. Inform patient/family regarding the reason for restraint  4. Q2H: Monitor safety, psychosocial status, comfort, nutrition and hydration  9/30/2024 1544 by Juliette Renteria, RN  Outcome: Completed  Flowsheets (Taken 9/30/2024 0800)  Remains free of injury from restraints (restraint for interference with medical device): Every 2 hours: Monitor safety, psychosocial status, comfort, nutrition and hydration  9/30/2024 0455 by Ange Castro, RN  Outcome: Progressing  Flowsheets (Taken 9/30/2024 0001)  Remains free of injury from restraints (restraint for interference with medical device):   Every 2 hours: Monitor safety, psychosocial status, comfort, nutrition and hydration   Determine that other, less restrictive measures have been tried or would not be effective before applying the restraint   Evaluate the patient's condition at the time of restraint application   Inform patient/family regarding the reason for restraint

## 2024-09-30 NOTE — ANESTHESIA PRE PROCEDURE
of 30 - 35%. EF by 2D Simpsons Biplane is 35%. Left ventricle is mildly dilated. Normal wall thickness. Septal motion is consistent with post-operative status. Moderate global hypokinesis present. Indeterminate diastolic function.  ·  Right Ventricle: Right ventricle is mildly dilated. Moderately reduced systolic function. TAPSE is abnormal. TDI systolic excursion is abnormal.  ·  Left Atrium: Left atrium is severely dilated. Left atrial volume index is severely increased (>48 mL/m2).  ·  Right Atrium: Right atrium is moderately dilated.  ·  Aortic Valve: Bioprosthetic valve with a size of 25 mm. AV mean gradient is 10 mmHg. No regurgitation. No stenosis.  ·  Mitral Valve: Bioprosthetic valve that is well-seated with a size of 31 mm. No regurgitation. No stenosis noted. MV mean gradient is 6 mmHg at 73 bpm.  (No significant changes from Echo 11/6/2023)  ·  Tricuspid Valve: Mild to moderate regurgitation. Unable to assess RVSP due to inadequate or insignificant tricuspid regurgitation.  ·  Aorta: Atherosclerosis of the sinus of Valsalva and ascending aorta.  ·  Image quality is adequate. Contrast used: Definity. Procedure performed with the patient in a supine position.       Neuro/Psych:   (+) CVA:, headaches:            GI/Hepatic/Renal:   (+) GERD:, renal disease: CRI          Endo/Other:    (+) blood dyscrasia: anticoagulation therapy:., electrolyte abnormalities, malignancy/cancer.                 Abdominal: normal exam      Abdomen: soft.      Vascular:          Other Findings:           Anesthesia Plan      general     ASA 4     (Per surgeon and cardiologist, the patient's cholecystitis will preclude pacemaker/ICD placement, therefore general surgery operation must be done as soon as feasible. Patient understands high risk of decompensation due to cardiac pathology.)  Induction: intravenous and rapid sequence.  arterial line  MIPS: Prophylactic antiemetics administered and Postoperative trial

## 2024-09-30 NOTE — PROGRESS NOTES
Hospitalist Progress Note    Name:  Jonathan Dove    /Age/Sex: 1940  (84 y.o. male)  MRN & CSN:  [id number] & [id number]    PCP: Raphael Huntley Jr., MD    Date of Admission: 2024    Patient Status:  Inpatient     Chief Complaint:   Chief Complaint   Patient presents with    Irregular Heart Beat     [facility] for unstable V-tach, per EMS pt was in vtach but was alert and oriented with SBP in 80s. EMS gave 5 mg versed en route and cardioverted to NSR with PVCs, pt being bagged upon arrival, pulse still present       Hospital Course:   Patient admitted for V tach. Was shocked 1x on admission. Was intubated for cardioversion, now extubated.  Sent for cath nonobstructive, has low EF 30 to 35%.  Also found to have acute cholecystitis but improving with conservative management, patient high risk for surgery and now plan for that.  Will need ICD, but currently being deferred until EP can assure low risk from infectious standpoint.    Has been confused/delirious and was started on Precedex drip.    Subjective:  Today is:  Hospital Day: 7.  Patient seen and examined in CVU-/2912.     Sitting up in bed.  Not confused today.  Urine still bloody.  Denies pain.    Family at bedside and updated.    Medications:  Reviewed    Infusion Medications    dexmedeTOMIDine 0.2 mcg/kg/hr (24 1409)    sodium chloride      phenylephrine (ARACELI-SYNEPHRINE) 50 mg in sodium chloride 0.9 % 250 mL infusion Stopped (24 1725)    lactated ringers IV soln 20 mL/hr at 24 1409    sodium chloride 5 mL/hr at 24 1009     Scheduled Medications    potassium chloride  20 mEq Oral TID WC    carvedilol  6.25 mg Oral BID WC    amiodarone  400 mg Oral Daily    sacubitril-valsartan  0.5 tablet Oral QHS    lactobacillus  1 capsule Oral BID WC    sodium chloride flush  5-40 mL IntraVENous 2 times per day    piperacillin-tazobactam  3,375 mg IntraVENous Q8H    pantoprazole  40 mg IntraVENous  airspace disease   identified.         CT HEAD WO CONTRAST   Final Result   No acute intracranial abnormality.      Mild generalized cerebral volume loss and moderate chronic microvascular   ischemic changes of the periventricular and subcortical white matter.         XR CHEST PORTABLE   Final Result   1. Endotracheal tube is noted with its tip approximately 3.8 cm from the   yunior.   2. Small left pleural effusion and/or basilar atelectasis.                 Assessment/Plan:    Active Hospital Problems    Diagnosis     Acute cholecystitis [K81.0]     Gross hematuria [R31.0]     Acute cholecystitis due to biliary calculus [K80.00]     On mechanically assisted ventilation (HCC) [Z99.11]     Chronic atrial flutter (HCC) [I48.92]     Acute on chronic systolic heart failure (HCC) [I50.23]     Acute respiratory failure with hypoxia [J96.01]     Somnolence [R40.0]     V tach (HCC) [I47.20]     CKD (chronic kidney disease), stage III (HCC) [N18.30]     Essential hypertension [I10]     CAD (coronary artery disease) [I25.10]     Mixed hyperlipidemia [E78.2]          Hospital Day: 7    This is a 84 y.o. male who presented to [facility] on 9/24/2024 and is being treated for:    Vtach/NICM  -Cardioversion x1 in ED  -Heart cath normal, echocardiogram EF 30 to 35%  -If pressors are needed, use flako; keep MAP >65  -Will likely need pacemaker/defibrillator placed, EP will place prior to discharge  -Daily labs; replace electrolytes as needed. Keep k >4, mag >2  Continue amiodarone  -Continue Entresto  -Cardiology consulted    Acute cholecystitis  -US show acute cholecysitis and cholelithiasis  -Plan for cholecystectomy on 10/1  -N.p.o. at midnight  -General surgery following    Acute confusion/delirium  -Likely 2/2 infection versus ICU delirium  -Okay to continue Precedex drip  -May benefit from Seroquel while inpatient    Hematuria,gross  -Likely traumatic sweeney  -Continues to have bloody urine  -Continue bladder

## 2024-09-30 NOTE — PLAN OF CARE
Problem: Pain  Goal: Verbalizes/displays adequate comfort level or baseline comfort level  Outcome: Progressing  Flowsheets (Taken 9/29/2024 1800 by Lizette Hernandez RN)  Verbalizes/displays adequate comfort level or baseline comfort level: Encourage patient to monitor pain and request assistance     Problem: Chronic Conditions and Co-morbidities  Goal: Patient's chronic conditions and co-morbidity symptoms are monitored and maintained or improved  Outcome: Progressing     Problem: Safety - Adult  Goal: Free from fall injury  Outcome: Progressing     Problem: ABCDS Injury Assessment  Goal: Absence of physical injury  Outcome: Progressing     Problem: Respiratory - Adult  Goal: Achieves optimal ventilation and oxygenation  Outcome: Progressing     Problem: Cardiovascular - Adult  Goal: Maintains optimal cardiac output and hemodynamic stability  Outcome: Progressing  Goal: Absence of cardiac dysrhythmias or at baseline  Outcome: Progressing     Problem: Genitourinary - Adult  Goal: Absence of urinary retention  Outcome: Progressing  Goal: Urinary catheter remains patent  Outcome: Progressing     Problem: Metabolic/Fluid and Electrolytes - Adult  Goal: Electrolytes maintained within normal limits  Outcome: Progressing  Goal: Hemodynamic stability and optimal renal function maintained  Outcome: Progressing     Problem: Safety - Medical Restraint  Goal: Remains free of injury from restraints (Restraint for Interference with Medical Device)  Description: INTERVENTIONS:  1. Determine that other, less restrictive measures have been tried or would not be effective before applying the restraint  2. Evaluate the patient's condition at the time of restraint application  3. Inform patient/family regarding the reason for restraint  4. Q2H: Monitor safety, psychosocial status, comfort, nutrition and hydration  Outcome: Progressing  Flowsheets (Taken 9/30/2024 0001)  Remains free of injury from restraints (restraint for

## 2024-09-30 NOTE — CARE COORDINATION
Thu with Mylene nick is following pt [phone] and will start pre cert when pt is closer to discharge.     Per notes: Plans for cholecystectomy tomorrow and ICD later this week.    CM will continue follow for pt progress and discharge plan.     Mana Lowe RN, BSN  [phone]

## 2024-09-30 NOTE — PROGRESS NOTES
[facility]        Progress Note    Patient Name: Jonathan Dove  MRN: [id number]  YOB: 1940  Date of Evaluation: 2024    Chief Complaint: Abdominal pain    Subjective:  No acute events overnight  Denies pain  No nausea or vomiting, poor appetite  Passing stool  Resting in bed at this time      Vital Signs:  Patient Vitals for the past 24 hrs:   BP Temp Temp src Pulse Resp SpO2   24 1130 96/61 97.1 °F (36.2 °C) Temporal 60 18 93 %   24 0800 (!) 88/60 -- -- 55 -- (!) 88 %   24 0734 91/65 (!) 96 °F (35.6 °C) Temporal 62 18 95 %   24 0600 (!) 143/76 -- -- 78 -- 96 %   24 0500 (!) 91/53 -- -- 62 -- 94 %   24 0430 (!) 94/56 -- -- 62 -- --   24 0415 (!) 149/68 -- -- 65 -- --   24 0400 (!) 82/57 -- -- 69 -- 95 %   24 0300 (!) 86/53 -- -- 69 -- 94 %   24 0100 111/63 -- -- 84 -- 93 %   24 0000 119/84 97.3 °F (36.3 °C) Temporal 100 20 --   24 (!) 90/58 -- -- 78 -- 94 %   240 93/68 -- -- 75 -- 94 %   24 (!) 84/67 -- -- -- -- --   24 100/60 -- -- 66 20 94 %   24 (!) 82/63 -- -- 69 17 94 %   24 (!) 84/54 97.3 °F (36.3 °C) Temporal 71 19 95 %   24 1900 123/71 -- -- 67 22 --   24 -- -- -- 63 -- --   24 1801 116/78 -- -- -- -- --   24 1800 116/78 98.1 °F (36.7 °C) Temporal 63 16 100 %   24 1730 (!) 110/53 -- -- 61 -- 95 %      TEMPERATURE HISTORY 24H: Temp (24hrs), Av.2 °F (36.2 °C), Min:96 °F (35.6 °C), Max:98.1 °F (36.7 °C)    BLOOD PRESSURE HISTORY: Systolic (36hrs), Av , Min:82 , Max:149    Diastolic (36hrs), Av, Min:53, Max:89      Intake/Output:  I/O last 3 completed shifts:  In: 4482.3 [P.O.:540; I.V.:3542.4; IV Piggyback:399.9]  Out: 37948 [Urine:67360]  I/O this shift:  In: 329.9 [I.V.:279.9; IV Piggyback:50]  Out: 3300 [Urine:3300]  Drain/tube Output:       Physical Exam:  General:    Culture, Blood 2 No Growth after 4 days of incubation. (9/24/2024)  Not in Time Range      Fecal occult  No results found for requested labs within last 30 days.     GI bacterial pathogens by PCR  No results found for requested labs within last 30 days.     C. difficile  No results found for requested labs within last 30 days.     Urine culture  No results found for: \"LABURIN\"    Pathology:  No relevant pathology     Imaging:  I have personally reviewed the following films:    XR CHEST PORTABLE    Result Date: 9/29/2024  EXAMINATION: ONE XRAY VIEW OF THE CHEST 9/29/2024 5:50 pm COMPARISON: 09/27/2024 HISTORY: ORDERING SYSTEM PROVIDED HISTORY: crackles SOB TECHNOLOGIST PROVIDED HISTORY: Reason for exam:->crackles SOB Reason for Exam: crackles SOB FINDINGS: Status post median sternotomy and valve replacement.  Right internal jugular central venous catheter.  Low lung volume.  Bilateral heterogeneous pulmonary opacity is demonstrated, right greater than left.  Costophrenic angles are obscured.  No pneumothorax.  Cardiac and mediastinal silhouettes are similar to prior.    Pulmonary edema and small bilateral pleural effusions.      Scheduled Meds:   potassium chloride  20 mEq Oral TID WC    carvedilol  6.25 mg Oral BID WC    amiodarone  400 mg Oral Daily    sacubitril-valsartan  0.5 tablet Oral QHS    lactobacillus  1 capsule Oral BID WC    sodium chloride flush  5-40 mL IntraVENous 2 times per day    piperacillin-tazobactam  3,375 mg IntraVENous Q8H    pantoprazole  40 mg IntraVENous Daily    aspirin  81 mg Oral Daily    [Held by provider] apixaban  5 mg Oral BID    sodium chloride flush  5-40 mL IntraVENous 2 times per day     Continuous Infusions:   dexmedeTOMIDine 0.2 mcg/kg/hr (09/30/24 1409)    sodium chloride      phenylephrine (ARACELI-SYNEPHRINE) 50 mg in sodium chloride 0.9 % 250 mL infusion Stopped (09/26/24 1725)    lactated ringers IV soln 20 mL/hr at 09/30/24 1409    sodium chloride 5 mL/hr at 09/29/24 1009

## 2024-09-30 NOTE — PROGRESS NOTES
[facility]   Daily Progress Note      Admit Date:  9/24/2024    HPI:    Mr. Dove is an 84 year old male with history of hyperlipidemia, status post CAD, AVR and MVR/MALLORY clip in 1/2020, declined ablation for NSVT, atrial flutter on eliquis.  He follows with Dr Chen at [facility]     He was brought to the ER after being found to be in VT.  BP in the 80s, given versed and shocked, intubated and sedated in the ED.  [facility] no intervention.  It has been discussed in the past about ICD but patient had declined  Ultrasound of abdomen with cholelithiasis with acute cholecystitis      Subjective:  Patient is being seen for acute on chronic systolic heart failure. There were no acute overnight cardiac events.  He is on 2 L of oxygen.  He had issues with confusion over the weekend but this is much better.  He denies any increased shortness of breath or chest pain.  Plans for ICD prior to discharge.  BP on the soft side and only on half entresto and did not get it last night due to low BP.  Coreg started over the weekend  Creat 1.2 bnp [id number] sodium 133-141    Objective:   BP (!) 88/60   Pulse 55   Temp (!) 96 °F (35.6 °C) (Temporal)   Resp 18   Ht 1.727 m (5' 8\")   Wt 79.2 kg (174 lb 9.7 oz)   SpO2 (!) 88%   BMI 26.55 kg/m²     Intake/Output Summary (Last 24 hours) at 9/30/2024 1128  Last data filed at 9/30/2024 1002  Gross per 24 hour   Intake 1567.05 ml   Output 8100 ml   Net -6532.95 ml          Physical Exam:  General:  alert and oriented  Skin:  Warm and dry.  No unusual bruising or rash  Neck:  Supple.  No JVD or carotid bruit appreciated  Chest:  Normal effort.  Clear to auscultation, no wheezes/rhonchi/rales  Cardiovascular:  AF, S1/S2, no murmur/gallop/rub  Abdomen:  Soft, nontender, +bowel sounds  Extremities:  No edema  Neurological: no focal deficits  Psychological: normal mood and affect      Medications:    potassium chloride  20 mEq Oral TID WC    carvedilol  6.25 mg Oral BID WC     amiodarone  400 mg Oral Daily    sacubitril-valsartan  0.5 tablet Oral QHS    lactobacillus  1 capsule Oral BID WC    sodium chloride flush  5-40 mL IntraVENous 2 times per day    piperacillin-tazobactam  3,375 mg IntraVENous Q8H    pantoprazole  40 mg IntraVENous Daily    aspirin  81 mg Oral Daily    [Held by provider] apixaban  5 mg Oral BID    sodium chloride flush  5-40 mL IntraVENous 2 times per day      dexmedeTOMIDine 0.4 mcg/kg/hr (09/30/24 0624)    sodium chloride      phenylephrine (ARACELI-SYNEPHRINE) 50 mg in sodium chloride 0.9 % 250 mL infusion Stopped (09/26/24 1725)    lactated ringers IV soln 25 mL/hr at 09/30/24 0624    sodium chloride 5 mL/hr at 09/29/24 1009       Lab Data:  CBC:   Recent Labs     09/28/24  0403 09/29/24  0500 09/30/24  0600   WBC 10.6 8.4 8.1   HGB 13.1* 12.4* 13.0*   * 128* 157     BMP:    Recent Labs     09/28/24  0403 09/28/24  1536 09/29/24  0500 09/29/24  1019 09/30/24  0600     --  139  --  141   K 3.2*   < > 3.3* 4.2 4.0   CO2 18*  --  21  --  25   BUN 18  --  15  --  17   CREATININE 1.2  --  1.0  --  1.3    < > = values in this interval not displayed.     INR:  No results for input(s): \"INR\" in the last 72 hours.  BNP:    Recent Labs     09/29/24  1758   PROBNP 3,322*         Diagnostics:  [facility] 9/24/2024  Artery Findings   LM 20% ostial, no dampening, plenty contrast reflux into aorta   LAD 40% mid   Cx Normal   RCA Normal   Dominance RCA Dominant   LVEDP 15mmHg Normal 3-12mmHg   LVG EF N/A d/t renal function Normal >/= 55%   LVG WM N/A d/t renal function   AVG Normal      INTERVENTION(S)   None    Echo at [facility] [place] 7/5/2024  - Left ventricle: Systolic function is severely reduced. The estimated     ejection fraction is 25-30%. Diastolic dysfunction; unable to assess left     atrial pressure.   - Aortic valve: A bioprosthetic valve is present. The peak systolic gradient     is 10mm Hg.   - Mitral valve: A bioprosthesis is present.   - Left atrium: The

## 2024-10-01 ENCOUNTER — ANESTHESIA (OUTPATIENT)
Dept: OPERATING ROOM | Age: 84
End: 2024-10-01
Payer: MEDICARE

## 2024-10-01 PROCEDURE — 2580000003 HC RX 258: Performed by: NURSE ANESTHETIST, CERTIFIED REGISTERED

## 2024-10-01 PROCEDURE — 2500000003 HC RX 250 WO HCPCS: Performed by: NURSE ANESTHETIST, CERTIFIED REGISTERED

## 2024-10-01 PROCEDURE — 6360000002 HC RX W HCPCS: Performed by: NURSE ANESTHETIST, CERTIFIED REGISTERED

## 2024-10-01 RX ORDER — LIDOCAINE HYDROCHLORIDE 20 MG/ML
INJECTION, SOLUTION EPIDURAL; INFILTRATION; INTRACAUDAL; PERINEURAL
Status: DISCONTINUED | OUTPATIENT
Start: 2024-10-01 | End: 2024-10-01 | Stop reason: SDUPTHER

## 2024-10-01 RX ORDER — ROCURONIUM BROMIDE 10 MG/ML
INJECTION, SOLUTION INTRAVENOUS
Status: DISCONTINUED | OUTPATIENT
Start: 2024-10-01 | End: 2024-10-01 | Stop reason: SDUPTHER

## 2024-10-01 RX ORDER — ETOMIDATE 2 MG/ML
INJECTION INTRAVENOUS
Status: DISCONTINUED | OUTPATIENT
Start: 2024-10-01 | End: 2024-10-01 | Stop reason: SDUPTHER

## 2024-10-01 RX ORDER — DEXAMETHASONE SODIUM PHOSPHATE 4 MG/ML
INJECTION, SOLUTION INTRA-ARTICULAR; INTRALESIONAL; INTRAMUSCULAR; INTRAVENOUS; SOFT TISSUE
Status: DISCONTINUED | OUTPATIENT
Start: 2024-10-01 | End: 2024-10-01 | Stop reason: SDUPTHER

## 2024-10-01 RX ORDER — FENTANYL CITRATE 50 UG/ML
INJECTION, SOLUTION INTRAMUSCULAR; INTRAVENOUS
Status: DISCONTINUED | OUTPATIENT
Start: 2024-10-01 | End: 2024-10-01 | Stop reason: SDUPTHER

## 2024-10-01 RX ORDER — ONDANSETRON 2 MG/ML
INJECTION INTRAMUSCULAR; INTRAVENOUS
Status: DISCONTINUED | OUTPATIENT
Start: 2024-10-01 | End: 2024-10-01 | Stop reason: SDUPTHER

## 2024-10-01 RX ORDER — CALCIUM CHLORIDE 100 MG/ML
INJECTION INTRAVENOUS; INTRAVENTRICULAR
Status: DISCONTINUED | OUTPATIENT
Start: 2024-10-01 | End: 2024-10-01 | Stop reason: SDUPTHER

## 2024-10-01 RX ADMIN — FENTANYL CITRATE 100 MCG: 50 INJECTION, SOLUTION INTRAMUSCULAR; INTRAVENOUS at 12:33

## 2024-10-01 RX ADMIN — PHENYLEPHRINE HYDROCHLORIDE 50 MCG/MIN: 10 INJECTION INTRAVENOUS at 12:46

## 2024-10-01 RX ADMIN — DEXAMETHASONE SODIUM PHOSPHATE 10 MG: 4 INJECTION, SOLUTION INTRAMUSCULAR; INTRAVENOUS at 12:41

## 2024-10-01 RX ADMIN — CALCIUM CHLORIDE 1 G: 100 INJECTION INTRAVENOUS; INTRAVENTRICULAR at 13:11

## 2024-10-01 RX ADMIN — ETOMIDATE 10 MG: 20 INJECTION, SOLUTION INTRAVENOUS at 12:33

## 2024-10-01 RX ADMIN — ROCURONIUM BROMIDE 20 MG: 10 INJECTION, SOLUTION INTRAVENOUS at 13:45

## 2024-10-01 RX ADMIN — LIDOCAINE HYDROCHLORIDE 100 MG: 20 INJECTION, SOLUTION EPIDURAL; INFILTRATION; INTRACAUDAL; PERINEURAL at 12:33

## 2024-10-01 RX ADMIN — ROCURONIUM BROMIDE 80 MG: 10 INJECTION, SOLUTION INTRAVENOUS at 12:33

## 2024-10-01 RX ADMIN — SUGAMMADEX 200 MG: 100 INJECTION, SOLUTION INTRAVENOUS at 14:30

## 2024-10-01 RX ADMIN — ONDANSETRON 4 MG: 2 INJECTION INTRAMUSCULAR; INTRAVENOUS at 13:56

## 2024-10-01 ASSESSMENT — ENCOUNTER SYMPTOMS: SHORTNESS OF BREATH: 1

## 2024-10-01 NOTE — PROGRESS NOTES
Pacu care complete.  Abdominal incisions approximated, ice pack applied.  Castañeda catheter draining pink urine.  Pt appears to be resting comfortably with eyes closed.  Pt will transfer to CVU.

## 2024-10-01 NOTE — PROGRESS NOTES
Phenylephrine drip infusing per order, will titrate per parameters.  250 ml's albumin infusing per order.  Will monitor

## 2024-10-01 NOTE — ANESTHESIA PROCEDURE NOTES
Arterial Line:    An arterial line was placed using ultrasound guidance, in the pre-op for the following indication(s): continuous blood pressure monitoring and blood sampling needed.    A 20 gauge (size) (length), Arrow (type) catheter was placed, Seldinger technique used, into the right radial artery, secured by tape and Tegaderm.  Anesthesia type: Local  Local infiltration: Injection    Events:  patient tolerated procedure well with no complications and EBL < 5mL.10/1/2024 12:15 PM10/1/2024 12:17 PM  Anesthesiologist: Bong Rivers MD  Performed: Anesthesiologist   Preanesthetic Checklist  Completed: patient identified, IV checked, site marked, risks and benefits discussed, surgical/procedural consents, equipment checked, pre-op evaluation, timeout performed, anesthesia consent given, oxygen available, monitors applied/VS acknowledged, fire risk safety assessment completed and verbalized and blood product R/B/A discussed and consented

## 2024-10-01 NOTE — PROGRESS NOTES
Urology Progress Note  [facility]     Patient: Jonathan Dove MRN: [id number]  Room/Bed: [facility]-2912/2912-01   YOB: 1940  Age/Sex: 84 y.o.male  Admission Date: 9/24/2024     Date of Service:  10/1/2024    ASSESSMENT/PLAN     1. Ventricular tachycardia (HCC)    2. STEMI (ST elevation myocardial infarction) ([facility])    3. Syncope and collapse    4. Acute encephalopathy    5. Septicemia ([facility])    6. Hypokalemia    7. V tach (HCC)      84yoM w/ hx of CHF, Chronic Atrial Flutter, Prostetic Mitral valve, CAD s/p CABG. Presented to [facility] on 9/24 in V-tach requiring cardioversion & intubation. Extubated 9/25. Question of possible acute cholecystitis. Urology consulted after amaris blood noted in catheter bag, staff unable to irrigate. On Eliquis and ASA for DVT prevention.     Pt and imaging reviewed with Dr. Krishnamurthy. Likely source of bleeding d/t traumatic sweeney placement through large prostate while on AC. Minimal clot seen on CT urogram.  Has 24f 3 way catheter in place, on CBI.     -----------------------------------------------------------------------------    Urine is red but cbi has been reduced to 20% rate. Mentation improved today. General surgery planning cholecystectomy.     Afebrile. Cr 1. WBC WNL. Hgb 12.4. Plt 128.        Recommendations:  - monitor H&H - stable  - Finasteride  - concern pt may be causing recurrent trauma and tugging at catheter, continue sitter, mgmt of agitation per IM  - Continue CBI and titrate rate to keep color herminia colored  - Hold AC, will plan to resume AC when urine a clear yellow x24-48 hours.   - continue sweeney, this can be removed prior to d/c once pt is medically stable  - will continue to follow along with you    All patient questions were answered. He understands the plan as listed above.    SUBJECTIVE     Chief Complaint:   Chief Complaint   Patient presents with    Irregular Heart Beat     Pt from home via [facility] for unstable V-tach, per EMS pt  was in vtach but was alert and oriented with SBP in 80s. EMS gave 5 mg versed en route and cardioverted to NSR with PVCs, pt being bagged upon arrival, pulse still present       24 Hour Events: Pt is a poor historian. He denies any belly pain or pressure. RN reports urine was burgundy in catheter tubing with CBI clamped earlier this morning. CBI is about 70% clamped and urine is pale peach in colore. Ongoing agitation. Requiring sitter and redirection.    OBJECTIVE     Hospital Problem List:  Principal Problem:    V tach ([facility])  Active Problems:    Mixed hyperlipidemia    CAD (coronary artery disease)    Essential hypertension    CKD (chronic kidney disease), stage III ([facility])    On mechanically assisted ventilation ([facility])    Chronic atrial flutter ([facility])    Acute on chronic systolic heart failure ([facility])    Acute respiratory failure with hypoxia    Somnolence    Gross hematuria    Acute cholecystitis due to biliary calculus    Acute cholecystitis  Resolved Problems:    * No resolved hospital problems. *      Physical Exam:  Vitals:    10/01/24 0739   BP: 114/61   Pulse: 64   Resp: 17   Temp: (!) 96.4 °F (35.8 °C)   SpO2: 96%     GEN: Pleasantly confused elderly  male resting in bed, intermittently agitated and grabbing at catheter, sitter at bedside, pt able to be re-directed  NEURO/PSYCH: Oriented to self  RESP: Normal respirations, no audible wheezing, on RA   ABDOMEN: Abdomen soft, non-tender, non-distended.  GENITOURINARY: sweeney draining pale peach colored urine w/ CBI running ~ 70% clamped  NEURO: No gross motor abn    Ins/Outs:    Intake/Output Summary (Last 24 hours) at 10/1/2024 1017  Last data filed at 10/1/2024 1013  Gross per 24 hour   Intake 1443.91 ml   Output 5800 ml   Net -4356.09 ml       Labs:  CBC   Lab Results   Component Value Date/Time    WBC 7.8 10/01/2024 04:00 AM    RBC 3.96 10/01/2024 04:00 AM    HGB 12.4 10/01/2024 04:00 AM    HCT 36.8 10/01/2024 04:00 AM    MCV 92.9 10/01/2024 04:00 AM

## 2024-10-01 NOTE — PROGRESS NOTES
Pt received from PACU. Assessment completed. Abd incisions x 3; glued, C/D/I. Call light in reach.

## 2024-10-01 NOTE — PROGRESS NOTES
Pt to pacu from OR, awakening.  Abdominal incisions with glue approximated.  3 way sweeney catheter with CBI running, pink urine returned.  Sweeney bag changed d/t large clots found and unable to drain bag. B/P low, MD aware, orders received.  Will monitor   same name as above

## 2024-10-01 NOTE — ANESTHESIA POSTPROCEDURE EVALUATION
Department of Anesthesiology  Postprocedure Note    Patient: Jonathan Dove  MRN: [id number]  YOB: 1940  Date of evaluation: 10/1/2024    Procedure Summary       Date: 10/01/24 Room / Location: [address]    Anesthesia Start: 1225 Anesthesia Stop: 1456    Procedure: ROBOTIC LAPAROSCOPIC CHOLECYSTECTOMY WITH  CHOLANGIOGRAM (Abdomen) Diagnosis:       Acute cholecystitis      (Acute cholecystitis [K81.0])    Surgeons: Valentino Cortez MD Responsible Provider: Bong Rivers MD    Anesthesia Type: General ASA Status: 4            Anesthesia Type: General    Omega Phase I: Omega Score: 8    Omega Phase II:      Anesthesia Post Evaluation    Patient location during evaluation: PACU  Patient participation: complete - patient participated  Level of consciousness: awake and alert  Pain score: 2  Airway patency: patent  Nausea & Vomiting: no nausea  Cardiovascular status: blood pressure returned to baseline, hypotensive and hemodynamically stable  Respiratory status: acceptable  Hydration status: euvolemic  Comments: Patient required albumin and phenylephrine drip in PACU. ABG showed no lactic acidosis. MAP near 65, likely related to SIRS response. Discharging to ICU with phenylephrine drip.  Pain management: adequate    No notable events documented.

## 2024-10-01 NOTE — PROGRESS NOTES
71   Resp: 16   Temp: 97.9 °F (36.6 °C)   SpO2: 97%      In: 1443.9 [P.O.:120; I.V.:673.1]  Out: 7800    Wt Readings from Last 3 Encounters:   10/01/24 77 kg (169 lb 12.1 oz)   24 77.6 kg (171 lb)   23 74.3 kg (163 lb 12.8 oz)     Temp  Av.3 °F (36.3 °C)  Min: 96.4 °F (35.8 °C)  Max: 97.9 °F (36.6 °C)  Pulse  Av.6  Min: 61  Max: 77  BP  Min: 71/45  Max: 138/58  SpO2  Av.3 %  Min: 89 %  Max: 97 %    Intake/Output Summary (Last 24 hours) at 10/1/2024 1235  Last data filed at 10/1/2024 1013  Gross per 24 hour   Intake 1443.91 ml   Output 5800 ml   Net -4356.09 ml       Constitutional: Oriented. No distress.   Cardiovascular: Normal rate, regular rhythm, S1&S2.    Pulmonary/Chest: Bilateral respiratory sounds. No rhonchi.    Abdominal: Soft. No tenderness   Musculoskeletal: No edema    Neurological: Alert and oriented. Follows command    Active Hospital Problems    Diagnosis Date Noted    Acute cholecystitis [K81.0] 2024    Gross hematuria [R31.0] 2024    Acute cholecystitis due to biliary calculus [K80.00] 2024    On mechanically assisted ventilation (HCC) [Z99.11] 2024    Chronic atrial flutter (HCC) [I48.92] 2024    Acute on chronic systolic heart failure (HCC) [I50.23] 2024    Acute respiratory failure with hypoxia [J96.01] 2024    Somnolence [R40.0] 2024    V tach (HCC) [I47.20] 2024    CKD (chronic kidney disease), stage III (HCC) [N18.30] 2019    Essential hypertension [I10] 2015    CAD (coronary artery disease) [I25.10] 2015    Mixed hyperlipidemia [E78.2]        Scheduled Meds:   finasteride  5 mg Oral Daily    carvedilol  3.125 mg Oral BID WC    potassium chloride  20 mEq Oral TID WC    amiodarone  400 mg Oral Daily    sacubitril-valsartan  0.5 tablet Oral QHS    lactobacillus  1 capsule Oral BID WC    sodium chloride flush  5-40 mL IntraVENous 2 times per day    piperacillin-tazobactam  3,375 mg IntraVENous Q8H     pantoprazole  40 mg IntraVENous Daily    aspirin  81 mg Oral Daily    [Held by provider] apixaban  5 mg Oral BID    sodium chloride flush  5-40 mL IntraVENous 2 times per day     Continuous Infusions:   dexmedeTOMIDine Stopped (09/30/24 1911)    sodium chloride      phenylephrine (ARACELI-SYNEPHRINE) 50 mg in sodium chloride 0.9 % 250 mL infusion Stopped (09/26/24 1725)    lactated ringers IV soln 20 mL/hr at 10/01/24 0729    sodium chloride 5 mL/hr at 09/29/24 1009     PRN Meds:.acetaminophen **OR** acetaminophen, sodium chloride flush, sodium chloride, benzocaine-menthol, morphine, potassium chloride, sodium chloride flush, sodium chloride, potassium chloride **OR** potassium alternative oral replacement **OR** potassium chloride, magnesium sulfate, ondansetron **OR** ondansetron, polyethylene glycol   Allergies   Allergen Reactions    Antihistamine [Diphenhydramine Hcl]      Unable to urinate due to enlarged prostate    Antihistamines, Chlorpheniramine-Type     Aspirin Other (See Comments)     325mg only.  Hematuria.     Diphenhydramine        Dane Reyes PA-C  [facility]  [phone]    All questions and concerns were addressed to the patient/family. Alternatives to my treatment were discussed. I have discussed the above stated plan and the patient verbalized understanding and agreed with the plan.

## 2024-10-01 NOTE — PROGRESS NOTES
Physical/Occupational Therapy  Jonathan Dove    Attempted PT/OT treatment this date. Pt currently off floor for surgery. Will re-attempt treatment session as schedule allows and as pt is medically appropriate.     Inna Moody PT, DPT [id number]   Elvi Wooten, OTR/L [id number]

## 2024-10-02 NOTE — CONSULTS
Jonathan Dove  10/2/2024  [id number]    Chief Complaint: V tach (HCC)    Subjective   HPI: Jonathan Dove is a 84 y.o. male with PMHx notable for HTN, HLD, Afib, nonischemic cardiomyopathy, CAD s/p CABG, s/p AVR and MVR, CVA  who presented on 9/24 with unstable ventricular tachycardia.  He was cardioverted by EMS.  He was intubated for airway protection.  He underwent cardiac catheterization which showed nonobstructive CAD, no intervention was performed.  Cardiology was consulted, recommended IV amiodarone which was eventually transition to oral.  AICD implantation was discussed, patient was agreeable however he developed acute cholecystitis and acute mental status changes which delayed this procedure.  He briefly required Precedex drip, but no longer needed.  General surgery was consulted, and patient is now s/p laparoscopic cholecystectomy on 10/1.  Now planning for ICD implantation on 10/3, as long as no sign of active infection.  Hospital course complicated by: hematuria, HTN.     Patient reports that he is doing well today.  He is having some mild pain at his laparoscopic surgery site.  He is tolerating his current diet.  He does report generalized weakness and fatigue.  He is interested in rehab if necessary to improve his function prior to returning home.    Past Medical History:   Diagnosis Date    Acute, but ill-defined, cerebrovascular disease     Cancer (HCC)     skin cancer of scalp - basal    Degeneration of cervical intervertebral disc     Degeneration of lumbar or lumbosacral intervertebral disc     Enlarged prostate     Irritable bowel syndrome     Proteinuria     Pure hypercholesterolemia     Unspecified cerebral artery occlusion with cerebral infarction 2011    bleed        Past Surgical History:   Procedure Laterality Date    AORTIC MITRAL VALVE REPLACEMENT      BACK SURGERY      CARDIAC CATHETERIZATION      attempted stent, but wouldn't hold    CARDIAC PROCEDURE N/A 9/24/2024    Left heart  cath / coronary angiography [facility]ARD[facility]ATH [facility]    CHOLECYSTECTOMY, LAPAROSCOPIC N/A 10/1/2024    ROBOTIC LAPAROSCOPIC CHOLECYSTECTOMY WITH  CHOLANGIOGRAM performed by Valentino Cortez MD at [facility] OR       Social History     Tobacco Use    Smoking status: Never    Smokeless tobacco: Never   Vaping Use    Vaping status: Never Used   Substance Use Topics    Alcohol use: No    Drug use: No       Prior Level of Function:   Independent for mobility, self-care, IADLs.  Lives alone in a one-level apartment with level entry.  Has some family locally who could provide intermittent assistance after discharge.         Objective   Objective:  Patient Vitals for the past 24 hrs:   BP Temp Temp src Pulse Resp SpO2 Height Weight   10/02/24 1800 -- -- -- 84 30 93 % -- --   10/02/24 1730 (!) 99/54 -- -- 87 19 96 % -- --   10/02/24 1715 -- -- -- 86 24 96 % -- --   10/02/24 1700 -- -- -- 85 22 99 % -- --   10/02/24 1645 -- -- -- 91 (!) 32 97 % -- --   10/02/24 1600 -- -- -- 79 (!) 31 97 % -- --   10/02/24 1530 -- -- -- 82 23 94 % -- --   10/02/24 1515 93/67 -- -- 81 25 -- -- --   10/02/24 1400 -- -- -- 90 23 96 % -- --   10/02/24 1330 -- -- -- 83 18 94 % -- --   10/02/24 1300 -- -- -- 86 21 91 % -- --   10/02/24 1230 (!) 95/54 -- -- 88 30 93 % -- --   10/02/24 1220 -- -- -- -- -- -- 1.727 m (5' 7.99\") --   10/02/24 1200 -- 98.2 °F (36.8 °C) Temporal 94 28 93 % -- --   10/02/24 1130 -- -- -- 98 23 93 % -- --   10/02/24 1000 -- -- -- 85 22 92 % -- --   10/02/24 0930 -- -- -- 87 23 91 % -- --   10/02/24 0915 -- -- -- 90 26 92 % -- --   10/02/24 0900 -- -- -- 85 22 92 % -- --   10/02/24 0830 -- -- -- 91 24 93 % -- --   10/02/24 0816 -- 98.3 °F (36.8 °C) Oral 84 14 -- -- --   10/02/24 0815 -- -- -- 84 14 95 % -- --   10/02/24 0800 -- -- -- 80 25 95 % -- --   10/02/24 0700 -- -- -- 80 22 94 % -- --   10/02/24 0600 -- -- -- 84 19 96 % -- --   10/02/24 0500 -- -- -- 82 14 98 % -- --   10/02/24

## 2024-10-02 NOTE — PROGRESS NOTES
Nutrition Note    RECOMMENDATIONS  PO Diet: Continue low fat diet   ONS: Continue Ensure Plus HP TID     ASSESSMENT   Pt visit triggered d/t length of stay. S/p lap di on 10/1. Upon visit, pt reported appetite was decreased prior to surgery, consuming 26-50% of meals per EMR, but improved today. Upon visit, pt was actively consuming lunch, with about half consumed. Family present stated pt consumes Ensure plus HP TID at times. Wt has been fluctuating, but not significant and likely due to fluids. Will continue to monitor PO intake and acceptance of ONS.       Malnutrition Status: No malnutrition  Acute Illness    NUTRITION DIAGNOSIS   Inadequate oral intake related to inadequate protein-energy intake as evidenced by Cholecystectomy    Goals: PO intake 50% or greater, by next RD assessment     NUTRITION RELATED FINDINGS  Objective: Trace RLE and +1 pitting LLE edema. Glu 104. LR 20 ml/hr. LBM 9/30. I/Os: -12.9 L. Rohit-Synephrine at 30 mcg/min.  Wounds: Surgical Incision    CURRENT NUTRITION THERAPIES  ADULT DIET; Regular; Low Fat (less than or equal to 50 gm/day)  ADULT ORAL NUTRITION SUPPLEMENT; Breakfast, Lunch, Dinner; Standard High Calorie/High Protein Oral Supplement  Diet NPO Exceptions are: Sips of Water with Meds     PO Intake: 26-50%   PO Supplement Intake:Unable to assess      ANTHROPOMETRICS  Current Height: 172.7 cm (5' 7.99\")  Current Weight - Scale: 77.4 kg (170 lb 10.2 oz)    Admission weight: 73.1 kg (161 lb 2.5 oz)  Ideal Body Weight (IBW): 154 lbs  (70 kg)        BMI: 26      COMPARATIVE STANDARDS  Total Energy Requirements (kcals/day): 4933-1010     Protein (g):         Fluid (mL/day):  5916-5073    EDUCATION  No recommendation at this time     The patient will be monitored per nutrition standards of care. Consult dietitian if additional nutrition interventions are needed prior to RD reassessment.     Belen Martinez    Contact: [phone]

## 2024-10-02 NOTE — OP NOTE
of the lower anterior ribs on the right side.  The omental adhesions to the ribs were taken down with blunt dissection.  We were able to expose the fundic region of the gallbladder with blunt dissection alone.  The gallbladder was markedly thickened and inflamed.  It was drained with an ovarian needle via the fundus of the gallbladder.  We then took the remainder of the omentum off the gallbladder.  Some areas were densely adherent and this required cautery to remove the omentum off the gallbladder.  He also had adhesions to the liver on both the left and right sides of the gallbladder.  Once the adhesions were taken down, we further decompressed the gallbladder with an ovarian needle.  We then began dissection in the hilar region.  The cystic duct was identified and dissected free circumferentially.  A clip was placed at the gallbladder-cystic duct junction.  A small transverse opening was then made in the duct.  A cholangiogram catheter was brought through a separate stab incision in the right upper quadrant and placed in the opening of the duct.  Intraoperative cholangiography revealed normal ductal anatomy.  There were no obvious filling defects.  There was fairly minimal flow into the duodenum.  The catheter was removed.  The cystic duct was doubly clipped distally before fully transected.  There were 2 cystic artery branches.  They were clipped singly proximally and distally and then divided.  The gallbladder was taken off the liver bed with Bovie electrocautery and then placed above the liver.  We irrigated the right upper quadrant and then applied further cautery to the liver bed.  We had excellent hemostasis, but there was some raw surface area to where, we did place some Fibrillar Surgicel.  The gallbladder was placed in the EndoCatch bag and then removed through the left-sided periumbilical trocar site.  The gallbladder was markedly thickened and enlarged.  In order to remove the gallbladder, we did have

## 2024-10-02 NOTE — PROGRESS NOTES
[facility]        Progress Note    Patient Name: Jonathan Dove  MRN: [id number]  YOB: 1940  Date of Evaluation: 10/2/2024    Postoperative Day #1    Subjective:  No acute events overnight  Pain controlled without narcotics  No nausea or vomiting, tolerating regular diet  Resting in bed at this time      Vital Signs:  Patient Vitals for the past 24 hrs:   BP Temp Temp src Pulse Resp SpO2 Height Weight   10/02/24 1400 -- -- -- 90 23 96 % -- --   10/02/24 1330 -- -- -- 83 18 94 % -- --   10/02/24 1300 -- -- -- 86 21 91 % -- --   10/02/24 1230 (!) 95/54 -- -- 88 30 93 % -- --   10/02/24 1220 -- -- -- -- -- -- 1.727 m (5' 7.99\") --   10/02/24 1200 -- 98.2 °F (36.8 °C) Temporal 94 28 93 % -- --   10/02/24 1130 -- -- -- 98 23 93 % -- --   10/02/24 1000 -- -- -- 85 22 92 % -- --   10/02/24 0930 -- -- -- 87 23 91 % -- --   10/02/24 0915 -- -- -- 90 26 92 % -- --   10/02/24 0900 -- -- -- 85 22 92 % -- --   10/02/24 0830 -- -- -- 91 24 93 % -- --   10/02/24 0816 -- 98.3 °F (36.8 °C) Oral 84 14 -- -- --   10/02/24 0815 -- -- -- 84 14 95 % -- --   10/02/24 0800 -- -- -- 80 25 95 % -- --   10/02/24 0700 -- -- -- 80 22 94 % -- --   10/02/24 0600 -- -- -- 84 19 96 % -- --   10/02/24 0500 -- -- -- 82 14 98 % -- --   10/02/24 0400 (!) 123/46 97 °F (36.1 °C) Temporal 84 19 98 % -- 77.4 kg (170 lb 10.2 oz)   10/02/24 0300 -- -- -- 82 13 98 % -- --   10/02/24 0200 -- -- -- 85 19 95 % -- --   10/02/24 0100 -- -- -- 81 14 92 % -- --   10/02/24 0000 (!) 119/49 97.1 °F (36.2 °C) Temporal 82 17 95 % -- --   10/01/24 2300 -- -- -- 87 17 97 % -- --   10/01/24 2200 -- -- -- 80 14 95 % -- --   10/01/24 2100 (!) 110/97 96.8 °F (36 °C) Temporal 77 17 96 % -- --   10/01/24 2000 102/60 -- -- 76 14 96 % -- --   10/01/24 1845 112/66 -- -- 78 17 96 % -- --   10/01/24 1830 112/64 -- -- 76 20 96 % -- --   10/01/24 1800 117/60 -- -- 74 20 97 % -- --   10/01/24 1753 113/68 -- -- 74 19 97 % -- --

## 2024-10-02 NOTE — PROGRESS NOTES
[facility]   Electrophysiology   Date: 10/2/2024  Reason for Follow up: VT   Chief Complaint   Patient presents with    Irregular Heart Beat     [facility] for [facility] V-tach, per EMS pt was in vtach but was alert and oriented with SBP in 80s. EMS gave 5 mg versed en route and cardioverted to NSR with PVCs, pt being bagged upon arrival, pulse still present       HPI: Jonathan Dove is a 84 y.o. male with h/o atrial fibrillation/flutter, PVCs, HLD, CVA, mild to moderate CAD.    S/p MVR (31 mm Magna Ease), AVR (25 mm Inspiris), and MALLORY clip in January 2020 at [facility].    Pt was brought to the hospital by EMS after he was found to have wide-complex tachycardia/VT and hypotension and had to be cardioverted en route to the hospital.  Patient has been intubated in the emergency room and has been admitted to the CVU.    He also has history of PVC in the past treated with Amiodarone and declined ablation.     He has been seen for Afib/flutter and had cardioversion at [facility]. Saw EP at [facility] and declined ablation and treated with amiodarone and and had cardioversion.      His ejection fraction got worse over time to 25-30% and Entresto / Farxiga was added and he was scheduled for LHC at [facility]. AICD was discussed with him at [facility].      Family states that he was not feeling well for the past few weeks and he was scheduled for LHC.      He has undergone cardiac catheterization which showed non-obstructive CAD with LV dysfunction with no intervention.     AICD implantation was discussed with him and he wants to proceed with procedure.    On 9/27 pt had imaging positive for cholelithiasis with cholecystitis, he has been on IV antibiotics. Had laparoscopic cholecystectomy 10/1.    Had mental status change over the weekend and required Precedex drip, has now improved.      Interval History:    Pt seen at bedside for follow up. Clinical notes and telemetry reviewed.  Discussed plan with  sodium chloride, benzocaine-menthol, morphine, potassium chloride, sodium chloride flush, sodium chloride, potassium chloride **OR** potassium alternative oral replacement **OR** potassium chloride, magnesium sulfate, ondansetron **OR** ondansetron, polyethylene glycol     Prior to Admission medications    Medication Sig Start Date End Date Taking? Authorizing Provider   sacubitril-valsartan (ENTRESTO) 24-26 MG per tablet Take 1 tablet by mouth 2 times daily   Yes Robin Bhandari MD   lovastatin (MEVACOR) 20 MG tablet TAKE ONE TABLET BY MOUTH ONCE NIGHTLY  Patient taking differently: Take 1 tablet by mouth nightly 8/12/24  Yes Raphael Huntley Jr., MD   dapagliflozin (FARXIGA) 10 MG tablet Take 1 tablet by mouth every morning 1/19/24  Yes Robin Bhandari MD   apixaban (ELIQUIS) 2.5 MG TABS tablet Take 1 tablet by mouth 2 times daily   Yes Robin Bhandari MD   betamethasone valerate (VALISONE) 0.1 % cream APPLY TOPICALLY TWO TIMES A DAY  Patient taking differently: as needed As needed 10/13/21  Yes Raphael Huntley Jr., MD   metoprolol succinate (TOPROL XL) 25 MG extended release tablet TAKE ONE TABLET BY MOUTH DAILY  Patient taking differently: Take 1 tablet by mouth daily 9/8/20  Yes Raphael Huntley Jr., MD   Netarsudil-Latanoprost (ROCKLATAN) 0.02-0.005 % SOLN Place 1 drop into both eyes every evening   Yes Robin Bhandari MD   tamsulosin (FLOMAX) 0.4 MG capsule Take 1 capsule by mouth daily   Yes ProviderRobin MD   MULTIPLE VITAMIN PO Take 1 tablet by mouth daily    Yes ProviderRobin MD   Cholecalciferol (VITAMIN D3) 1.25 MG (27051 UT) TABS Take 1 capsule by mouth daily    Yes Robin Bhandari MD   polyethylene glycol (GLYCOLAX) packet Take 1 packet by mouth every 48 hours as needed   Yes Robin Bhandari MD   aspirin 81 MG EC tablet Take 1 tablet by mouth daily   Yes Robin Bhandari MD   amitriptyline (ELAVIL) 50 MG tablet TAKE ONE TABLET BY

## 2024-10-02 NOTE — PROGRESS NOTES
Patient has appointment today.  We will discuss medications at the appointment time   times  Hearing:   hard of hearing  Observation:   General Observation:  telemetry, sweeney catheter (hematuria), 2L O2 via NC  Posture:   Fair    Subjective  General: Patient reclined in bed upon therapist arrival. Agreeable to PT/OT, daughter present   Pain: 2/10.  Location: abdomen    Pain Interventions: not applicable       Functional Mobility  Bed Mobility:  Supine to Sit: minimal assistance  Scooting: minimal assistance  Comments: HOB elevated for supine>sit with increased cues for sequencing.  Transfers:  Sit to stand transfer: moderate assistance  Stand to sit transfer: moderate assistance  Stand step transfer: moderate assistance  Comments: Pt. Keeping knees in a flexed position  Ambulation:  Ambulation not tested on this date secondary to weakness, fatigue.  Distance:   Gait Mechanics:   Comments:    Stair Mobility:  Stair mobility not completed on this date.  Comments:  Wheelchair Mobility:  No w/c mobility completed on this date.  Comments:  Balance:  Static Sitting Balance: fair (-): maintains balance at CGA with use of UE support  Dynamic Sitting Balance: poor (+): requires min (A) to maintain balance  Static Standing Balance: poor (-): requires max (A) to maintain balance  Dynamic Standing Balance: poor (-): requires max (A) to maintain balance  Comments:     Other Therapeutic Interventions    Functional Outcomes  AM-PAC Inpatient Mobility Raw Score : 10              Cognition  WFL  Comment:   Orientation:    alert and oriented x 4  Command Following:   WFL    Education  Barriers To Learning: hearing  Patient Education: patient educated on goals, PT role and benefits, plan of care, general safety, functional mobility training, transfer training, discharge recommendations  Learning Assessment:  patient verbalizes understanding, would benefit from continued reinforcement    Assessment  Activity Tolerance: Reports of dizziness sitting EOB, BP=94/67, SpO2=93-95%, HR=88-91; tolerated transfer the chair well

## 2024-10-02 NOTE — PROGRESS NOTES
[facility] - Inpatient Rehabilitation Department   Phone: [phone]    Occupational Therapy    [] Initial Evaluation            [x] Daily Treatment Note         [] Discharge Summary      Patient: Jonathan Dove   : 1940   MRN: [id number]   Date of Service:  10/2/2024    Admitting Diagnosis:  V tach (HCC)  Current Admission Summary: 83 yo male brought to [facility] by EMS on . Pt initially called EMS for lift assist, they encouraged the patient to come to the ED. Enroute pt found to be in V-tach, given meds and cardioverted enroute and again in ED. On arrival intubated for airway protection. LHC done  also, no significant findings. Extubated next day ().   Past Medical History:  has a past medical history of Acute, but ill-defined, cerebrovascular disease, Cancer (HCC), Degeneration of cervical intervertebral disc, Degeneration of lumbar or lumbosacral intervertebral disc, Enlarged prostate, Irritable bowel syndrome, Proteinuria, Pure hypercholesterolemia, and Unspecified cerebral artery occlusion with cerebral infarction.  Past Surgical History:  has a past surgical history that includes back surgery; Cardiac catheterization; Aortic mitral valve replacement; Cardiac procedure (N/A, 2024); and Cholecystectomy, laparoscopic (N/A, 10/1/2024).    Discharge Recommendations: Jonathan Dove scored a 15/24 on the AM-PAC ADL Inpatient form. Current research shows that an AM-PAC score of 17 or less is typically not associated with a discharge to the patient's home setting. Based on the patient's AM-PAC score and their current ADL deficits, it is recommended that the patient have 5-7 sessions per week of Occupational Therapy at d/c to increase the patient's independence.  At this time, this patient demonstrates complex nursing, medical, and rehabilitative needs, and would benefit from intensive rehabilitation services upon discharge from the Inpatient setting.  This patient demonstrates the  assistance   Patient will complete toileting at contact guard assistance   Patient will complete grooming at stand by assistance   Patient will complete functional transfers at minimal assistance   Patient will complete functional mobility at minimal assistance     Above goals reviewed on 10/2/2024.  All goals are ongoing at this time unless indicated above.       Therapy Session Time     Individual Group Co-treatment   Time In    1425   Time Out    1454   Minutes    29        Timed Code Treatment Minutes:   29      Total Treatment Minutes:  29     Electronically Signed By: Maverick Conley OTR/L [id number]

## 2024-10-02 NOTE — PROGRESS NOTES
Urology Progress Note  [facility]     Patient: Jonathan Dove MRN: [id number]  Room/Bed: [facility]-2912/2912-01   YOB: 1940  Age/Sex: 84 y.o.male  Admission Date: 9/24/2024     Date of Service:  10/2/2024    ASSESSMENT/PLAN     1. Ventricular tachycardia (HCC)    2. STEMI (ST elevation myocardial infarction) (HCC)    3. Syncope and collapse    4. Acute encephalopathy    5. Septicemia (HCC)    6. Hypokalemia    7. V tach (HCC)    8. Acute cholecystitis      84yoM w/ hx of CHF, Chronic Atrial Flutter, Prostetic Mitral valve, CAD s/p CABG. Presented to [facility] on 9/24 in V-tach requiring cardioversion & intubation. Extubated 9/25. Question of possible acute cholecystitis. Urology consulted after amaris blood noted in catheter bag, staff unable to irrigate. On Eliquis and ASA for DVT prevention.      Pt and imaging reviewed with Dr. Krishnamurthy. Likely source of bleeding d/t traumatic sweeney placement through large prostate while on AC. Minimal clot seen on CT urogram.  Has 24f 3 way catheter in place, on CBI.   ==  Underwent cholecystectomy yesterday with general surgery   Urine noted to be red yesterday, today it is more tea colored and sweeney draining well. CBI to 20% rate  Cr stable. Hgb 12.1    Recommendations:  Continue finasteride  Monitor h/h - stable   Concern pt may be causing recurrent trauma and tugging at catheter, continue sitter, mgmt of agitation per IM   Continue to hold AC until urine clear yellow x24-48 hours. Today urine is tea colored  Continue CBI  Will continue to follow along    All patient questions were answered. He understands the plan as listed above.    SUBJECTIVE     Chief Complaint:   Chief Complaint   Patient presents with    Irregular Heart Beat     Pt from home via [facility] for unstable V-tach, per EMS pt was in vtach but was alert and oriented with SBP in 80s. EMS gave 5 mg versed en route and cardioverted to NSR with PVCs, pt being bagged upon arrival, pulse  been inclined Discussed details of clinical condition and LHC with family     CT HEAD WO CONTRAST    Result Date: 9/24/2024  EXAMINATION: CT OF THE HEAD WITHOUT CONTRAST  9/24/2024 7:45 pm TECHNIQUE: CT of the head was performed without the administration of intravenous contrast. Automated exposure control, iterative reconstruction, and/or weight based adjustment of the mA/kV was utilized to reduce the radiation dose to as low as reasonably achievable. COMPARISON: None. HISTORY: ORDERING SYSTEM PROVIDED HISTORY: syncope TECHNOLOGIST PROVIDED HISTORY: Reason for exam:->syncope Has a \"code stroke\" or \"stroke alert\" been called?->No Decision Support Exception - unselect if not a suspected or confirmed emergency medical condition->Emergency Medical Condition (MA) Reason for Exam: syncope FINDINGS: BRAIN/VENTRICLES: There is no acute intracranial hemorrhage, mass effect or midline shift.  No abnormal extra-axial fluid collection.  The gray-white differentiation is maintained without evidence of an acute infarct.  There is no evidence of hydrocephalus. Mild generalized cerebral volume loss and moderate chronic microvascular ischemic changes of the periventricular and subcortical white matter. ORBITS: The visualized portion of the orbits demonstrate no acute abnormality. SINUSES: The visualized paranasal sinuses and mastoid air cells demonstrate no acute abnormality. SOFT TISSUES/SKULL:  No acute abnormality of the visualized skull or soft tissues.     No acute intracranial abnormality. Mild generalized cerebral volume loss and moderate chronic microvascular ischemic changes of the periventricular and subcortical white matter.     XR CHEST PORTABLE    Result Date: 9/24/2024  EXAMINATION: ONE XRAY VIEW OF THE CHEST 9/24/2024 8:03 pm COMPARISON: Chest x-ray dated 03/13/2010. HISTORY: ORDERING SYSTEM PROVIDED HISTORY: v tach, cardioversion, S/P intubation TECHNOLOGIST PROVIDED HISTORY: Reason for exam:->v tach, cardioversion,

## 2024-10-03 NOTE — PROGRESS NOTES
Hospitalist Progress Note    Name:  Jonathan Dove    /Age/Sex: 1940  (84 y.o. male)  MRN & CSN:  [id number] & [id number]    PCP: Raphael Huntley Jr., MD    Date of Admission: 2024    Patient Status:  Inpatient     Chief Complaint:   Chief Complaint   Patient presents with    Irregular Heart Beat     Pt from home via [facility] for unstable V-tach, per EMS pt was in vtach but was alert and oriented with SBP in 80s. EMS gave 5 mg versed en route and cardioverted to NSR with PVCs, pt being bagged upon arrival, pulse still present       Hospital Course:   Patient admitted for V tach. Was shocked 1x on admission. Was intubated for cardioversion, now extubated.  Sent for cath nonobstructive, has low EF 30 to 35%.  Also found to have acute cholecystitis but improving with conservative management, patient high risk for surgery and now plan for that.  Will need ICD, but currently being deferred until EP can assure low risk from infectious standpoint.    Has been confused/delirious and was started on Precedex drip.    Subjective:  Today is:  Hospital Day: 10.  Patient seen and examined in CVU-/2912.     Arriving back from AICD placement  Mildly sedated from procedure.    Medications:  Reviewed    Infusion Medications    dexmedeTOMIDine Stopped (24)    sodium chloride      phenylephrine (ARACELI-SYNEPHRINE) 50 mg in sodium chloride 0.9 % 250 mL infusion 30 mcg/min (10/02/24 0023)    lactated ringers IV soln 20 mL/hr at 10/01/24 0729    sodium chloride 800 mL/hr at 10/01/24 1432     Scheduled Medications    finasteride  5 mg Oral Daily    carvedilol  3.125 mg Oral BID WC    amiodarone  400 mg Oral Daily    sacubitril-valsartan  0.5 tablet Oral QHS    lactobacillus  1 capsule Oral BID WC    sodium chloride flush  5-40 mL IntraVENous 2 times per day    piperacillin-tazobactam  3,375 mg IntraVENous Q8H    pantoprazole  40 mg IntraVENous Daily    aspirin  81 mg Oral Daily    [Held by

## 2024-10-03 NOTE — PROGRESS NOTES
Hospitalist Progress Note    Name:  Jonathan Dove    /Age/Sex: 1940  (84 y.o. male)  MRN & CSN:  [id number] & [id number]    PCP: Raphael Huntley Jr., MD    Date of Admission: 2024    Patient Status:  Inpatient     Chief Complaint:   Chief Complaint   Patient presents with    Irregular Heart Beat     Pt from home via [facility] for unstable V-tach, per EMS pt was in vtach but was alert and oriented with SBP in 80s. EMS gave 5 mg versed en route and cardioverted to NSR with PVCs, pt being bagged upon arrival, pulse still present       Hospital Course:   Patient admitted for V tach. Was shocked 1x on admission. Was intubated for cardioversion, now extubated.  Sent for cath nonobstructive, has low EF 30 to 35%.  Also found to have acute cholecystitis but improving with conservative management, patient high risk for surgery and now plan for that.  Will need ICD, but currently being deferred until EP can assure low risk from infectious standpoint.    Has been confused/delirious and was started on Precedex drip.    Subjective:  Today is:  Hospital Day: 9.  Patient seen and examined in CVU-/2912.     Sitting up in bed.  Not confused today.  Urine still bloody.  Denies pain.    Family at bedside and updated.    Medications:  Reviewed    Infusion Medications    dexmedeTOMIDine Stopped (24)    sodium chloride      phenylephrine (ARACELI-SYNEPHRINE) 50 mg in sodium chloride 0.9 % 250 mL infusion 30 mcg/min (10/02/24 0023)    lactated ringers IV soln 20 mL/hr at 10/01/24 0729    sodium chloride 800 mL/hr at 10/01/24 1432     Scheduled Medications    finasteride  5 mg Oral Daily    carvedilol  3.125 mg Oral BID WC    amiodarone  400 mg Oral Daily    sacubitril-valsartan  0.5 tablet Oral QHS    lactobacillus  1 capsule Oral BID WC    sodium chloride flush  5-40 mL IntraVENous 2 times per day    piperacillin-tazobactam  3,375 mg IntraVENous Q8H    pantoprazole  40 mg IntraVENous Daily     time.    Hematuria,gross  -Likely traumatic sweeney  -Continues to have bloody urine  -Continue bladder irrigation  -AC on hold   -Urology following    HFrEF - new issue  -Probably 2/2 above  -Echo 9/25/24 showed LVEF 30-35%  -[facility] wnl  -I/Os    CKD III  -Creatinine 1.6 on admission; baseline approximately 1.3-1.7  -Avoid nephrotoxins  -Daily labs; trend creatinine    Chronic atrial flutter  -Holding eliquis for procedures and hematuria    On mechanical ventilation - resolved  -Extubated on 9/25  -Monitor O2 sat  -CCM consulted    Hypertension  -Continue home antihypertensives    Hyperlipidemia  -Continue home statin      Discussed management of the case with cardiology who recommended defibrillator placement    Drugs that require monitoring for toxicity include: Eliquis and the method of monitoring was/is CBC    DVT ppx: Oral anticoagulation - eliquis  GI ppx: PPI  Diet: ADULT DIET; Regular; Low Fat (less than or equal to 50 gm/day)  ADULT ORAL NUTRITION SUPPLEMENT; Breakfast, Lunch, Dinner; Standard High Calorie/High Protein Oral Supplement  Diet NPO Exceptions are: Sips of Water with Meds  Code Status: Full Code    PT/OT Eval Status: ordered    Disposition:  Anticipate prolonged stay and then snf.  Tentative plan for ICD placement later this week after cholecystectomy.  S/p Cholecystectomy on 10/1.  On Precedex.      I personally spent 32 minutes in providing critical care. Total critical care time includes caring for direct patient contact, management of life support systems, review of data including imaging and labs, discussions with other team members and physicians, but excludes procedures.      Marcelino Caceres MD  10/2/2024  8:20 PM

## 2024-10-03 NOTE — PROCEDURES
[facility]   Electrophysiology Procedure Note       Date of Procedure: 10/3/2024  Patient's Name: Jonathan Dove  YOB: 1940   Medical Record Number: [id number]  Procedure Performed by: Juan Song MD    Procedures performed:  Insertion of MRI compatible right ventricular defibrillator lead under fluoroscopy.  Insertion of MRI compatible right atrial lead under fluoroscopy  Insertion of a MRI compatible dual chamber ICD.  IV sedation.  Sedation start time: 14:58 PM  Sedation stop time: 16:08 PM  An independent trained observer assisted in the monitoring of the patient's level of consciousness and physiological status including vital signs.    Indication of procedure:  Jonathan Dove is a 84 y.o. male with ventricular tachycardia.     Details of procedure:   The patient was brought to the electrophysiology laboratory in stable condition. The patient was in a fasting and non-sedated state. The risks, benefits and alternatives of the procedure were discussed with the patient. The risks including, but not limited to, the risks of vascular injury, bleeding, infection, device malfunction, lead dislodgement, radiation exposure, injury to cardiac and surrounding structures (including pneumothorax), stroke, myocardial infarction and death were discussed in detail. The patient opted to proceed with the device implantation. Written informed consent was signed and placed in the chart. Prophylactic antibiotic was given.     The patient was prepped and draped in a sterile fashion. A timeout protocol was completed to identify the patient and the procedure being performed.    IV sedation was provided with IV Versed, Fentanyl.      Central venous access into the left axillary vein was obtained using ultrasound guidance and modified Seldinger technique. After central venous access was obtained, a sheath was placed in the axillary vein.     An incision was made in the left pectoral area after  administration of lidocaine. Using electrocautery and blunt dissection, a pocket was created. After central venous access was obtained, a sheath was placed in the axillary vein. A right ventricular lead was advanced into position in the apex under fluoroscopic guidance and using a series of curved stylets. The lead was actively fixated. After confirming appropriate function, the sheath was split and removed. The lead was secured to the underlying tissue using suture material.     A new sheath was advanced over a second previously placed wire into the vein. The atrial lead was advanced to the right atrial appendage and actively fixated under fluoroscopic guidance. After confirming appropriate function, the sheath was split and removed. The lead was secured to the underlying tissue using suture.      The pocket was irrigated with an antibiotic solution. The leads were then connected to the new pulse generator, dual chamber AICD which was then placed into the cleaned pocket. The pulse generator was sutured inside the pocket. TYRX was used to reduce risk of infection. SurgiFlo was used to reduce bleeding. The pocket was then closed in three separate subcutaneous layers using 2-0 & 3-0 Vicryl and subcuticular layer using 4-0 Vicryl. The skin was covered with pressure dressing.  A dual chamber AICD was implanted due to AV prolonged TN interval, and the need for AV  synchrony and physiologic pacing in the setting of heart failure.       All sponge and needle counts were reported as correct at the end of the procedure.   The patient tolerated the procedure well and there were no complications. Post-sedation evaluation was completed.  Patient was transported to the holding area in stable condition.     EBL less than 20 ml.     Lead and device information:       Plan:   The patient will have usual post-implant care, including chest x-ray, and interrogation of the device.

## 2024-10-03 NOTE — PROGRESS NOTES
[facility]        Progress Note    Patient Name: Jonathan Dove  MRN: [id number]  YOB: 1940  Date of Evaluation: 10/3/2024    Postoperative Day #1    Subjective:  No acute events overnight  Pain controlled without narcotics  No nausea or vomiting, tolerated regular diet well yesterday  Passing flatus and stool, reports some bloating  Resting in bed at this time      Vital Signs:  Patient Vitals for the past 24 hrs:   BP Temp Temp src Pulse Resp SpO2 Weight   10/03/24 1203 119/66 97.2 °F (36.2 °C) Temporal 76 23 94 % --   10/03/24 1101 -- -- -- 84 25 95 % --   10/03/24 1059 111/80 -- -- 81 17 90 % --   10/03/24 1000 -- -- -- 73 20 92 % --   10/03/24 0900 -- -- -- 67 14 95 % --   10/03/24 0811 -- -- -- -- 18 95 % --   10/03/24 0800 -- 97.8 °F (36.6 °C) Temporal 79 20 95 % --   10/03/24 0758 (!) 104/55 -- -- 78 22 95 % --   10/03/24 0600 -- -- -- 91 22 97 % --   10/03/24 0545 -- -- -- 85 14 95 % --   10/03/24 0530 -- -- -- 72 19 96 % --   10/03/24 0515 -- -- -- 86 25 98 % --   10/03/24 0500 -- -- -- 71 20 97 % --   10/03/24 0445 -- -- -- 70 16 97 % --   10/03/24 0430 -- -- -- 74 22 99 % --   10/03/24 0415 -- -- -- 71 21 98 % --   10/03/24 0400 -- 97.2 °F (36.2 °C) Temporal 75 27 96 % 81.3 kg (179 lb 3.7 oz)   10/03/24 0345 -- -- -- 72 25 92 % --   10/03/24 0330 -- -- -- 85 20 96 % --   10/03/24 0315 -- -- -- 86 22 95 % --   10/03/24 0300 -- -- -- 83 19 96 % --   10/03/24 0245 -- -- -- 84 19 93 % --   10/03/24 0230 -- -- -- 86 19 94 % --   10/03/24 0215 -- -- -- 85 25 96 % --   10/03/24 0200 -- -- -- 83 27 95 % --   10/03/24 0145 -- -- -- 85 18 94 % --   10/03/24 0130 -- -- -- 86 17 95 % --   10/03/24 0115 -- -- -- 81 26 94 % --   10/03/24 0100 -- -- -- 83 25 95 % --   10/03/24 0045 -- -- -- 85 23 95 % --   10/03/24 0030 -- -- -- 82 26 94 % --   10/03/24 0015 -- -- -- 85 27 95 % --   10/03/24 0000 -- 98.2 °F (36.8 °C) Temporal 91 28 95 % --   10/02/24 2345 -- --  anticipate ICD placement this afternoon per cardiology  10. Disposition: Okay for discharge home from a surgical perspective when medically stable    EDUCATION:  Educated patient on plan of care and disease process--all questions answered.    Plans discussed with patient and nursing.  Reviewed and discussed with Dr. Cortez.      Signed:  NAVIN Ram - CNP  10/3/2024 2:21 PM

## 2024-10-03 NOTE — PROCEDURES
[facility] Procedure Note  [facility]    Provider: NAVIN Stephenson CNP  Patient ID:  Admission Date: 2024 Name: Jonathan Dove  OR Date: n/a MRN: [id number]   Patient Location: [facility] : 1940  Attending: Marcelino Caceres MD Date of Service: 10/3/2024  PCP: Raphael Huntley Jr., MD     Diagnoses:  Gross Hematuria    Procedure:   1. Edvin Bladder Irrigation- 25456    Findings:   Irrigation of approximately 1CC's of Blood Clot  Difficult to irrigate sweeney.     Indication for Procedure:  The patient has experienced gross hematuria with suspected urinary clot retention.  The patient was informed of the need for bladder drainage based on worsening cr and nurses report leaking around sweeney.   We had a discussion of the procedure. He had a chance to ask questions which were answered prior to bladder  irrigation.     Procedure Details:  The standard drainage bag was removed from the end of the sweeney catheter. Using a erika syringe, sterile saline was instilled retrograde into the bladder and aspirated. Irrigation was performed in this fashion until all clot was presumably removed from the bladder and the until urine cleared to pink. He had a total return of 1mL of clot from his bladder. The standard leg bag was reinserted into the catheter tubing for drainage. He tolerated the procedure well.    Plan:  See Progress Notes for urology plan regarding Sweeney    NAVIN Stephenson CNP   10/3/2024

## 2024-10-03 NOTE — PROGRESS NOTES
0400 - patient found with bed completely saturated in urine and NS from CBI. Unable to measure quantity of urine, but NS bag approximately residential empty so estimating roughly 1500cc of net output unaccounted for. Castañeda repositioned and manually irrigated to check for a clot blocking drainage. Urine and NS still leaking around catheter, but much less than it was prior to troubleshooting.

## 2024-10-03 NOTE — PROGRESS NOTES
Patient transferred to CT and back to room without incident, family at bedside, denies needs at this time, will continue to monitor.

## 2024-10-03 NOTE — PROGRESS NOTES
Physical Therapy/Occupational Therapy  Jonathan Dove  Hold PT/OT this morning. Pt is off the floor for procedure at this time. Will follow up.  Thanks, Wilda Hoover, [id number]   Maverick Conley OTR/L [id number]

## 2024-10-03 NOTE — PROGRESS NOTES
H&P Update    I have reviewed the history and physical and examined the patient and updated with relevant changes.     Consent: I have discussed with the patient and/or the patient representative the indication, alternatives, and the possible risks and/or complications of the planned procedure and the anesthesia methods. The patient and/or patient representative appear to understand and agree to proceed.    Vitals:    10/03/24 1435   BP:    Pulse:    Resp:    Temp:    SpO2: 92%     Prior to Admission medications    Medication Sig Start Date End Date Taking? Authorizing Provider   sacubitril-valsartan (ENTRESTO) 24-26 MG per tablet Take 1 tablet by mouth 2 times daily   Yes Robin Bhandari MD   lovastatin (MEVACOR) 20 MG tablet TAKE ONE TABLET BY MOUTH ONCE NIGHTLY  Patient taking differently: Take 1 tablet by mouth nightly 8/12/24  Yes Raphael Huntley Jr., MD   dapagliflozin (FARXIGA) 10 MG tablet Take 1 tablet by mouth every morning 1/19/24  Yes Robin Bhandari MD   apixaban (ELIQUIS) 2.5 MG TABS tablet Take 1 tablet by mouth 2 times daily   Yes Robin Bhandari MD   betamethasone valerate (VALISONE) 0.1 % cream APPLY TOPICALLY TWO TIMES A DAY  Patient taking differently: as needed As needed 10/13/21  Yes Raphael Huntley Jr., MD   metoprolol succinate (TOPROL XL) 25 MG extended release tablet TAKE ONE TABLET BY MOUTH DAILY  Patient taking differently: Take 1 tablet by mouth daily 9/8/20  Yes Raphael Huntley Jr., MD   Netarsudil-Latanoprost (ROCKLATAN) 0.02-0.005 % SOLN Place 1 drop into both eyes every evening   Yes Robin Bhandari MD   tamsulosin (FLOMAX) 0.4 MG capsule Take 1 capsule by mouth daily   Yes Robin Bhandari MD   MULTIPLE VITAMIN PO Take 1 tablet by mouth daily    Yes Robin Bhandari MD   Cholecalciferol (VITAMIN D3) 1.25 MG (79146 UT) TABS Take 1 capsule by mouth daily    Yes Robin Bhandari MD   polyethylene glycol (GLYCOLAX) packet Take 1

## 2024-10-03 NOTE — CARE COORDINATION
Prior Authorization submitted for ARU approval with a reference number: [id number]     ARU will continue to follow progress and update discharge plan as needed.    LARRY StackN, .[phone]

## 2024-10-03 NOTE — PLAN OF CARE
Problem: Pain  Goal: Verbalizes/displays adequate comfort level or baseline comfort level  Outcome: Progressing     Problem: Chronic Conditions and Co-morbidities  Goal: Patient's chronic conditions and co-morbidity symptoms are monitored and maintained or improved  Outcome: Progressing     Problem: Safety - Adult  Goal: Free from fall injury  Outcome: Progressing     Problem: ABCDS Injury Assessment  Goal: Absence of physical injury  Outcome: Progressing     Problem: Respiratory - Adult  Goal: Achieves optimal ventilation and oxygenation  Outcome: Progressing     Problem: Cardiovascular - Adult  Goal: Maintains optimal cardiac output and hemodynamic stability  Outcome: Progressing  Goal: Absence of cardiac dysrhythmias or at baseline  Outcome: Progressing     Problem: Genitourinary - Adult  Goal: Absence of urinary retention  Outcome: Progressing  Goal: Urinary catheter remains patent  Outcome: Progressing     Problem: Metabolic/Fluid and Electrolytes - Adult  Goal: Electrolytes maintained within normal limits  Outcome: Progressing  Goal: Hemodynamic stability and optimal renal function maintained  Outcome: Progressing     Problem: Neurosensory - Adult  Goal: Achieves stable or improved neurological status  Outcome: Progressing  Goal: Achieves maximal functionality and self care  Outcome: Progressing     Problem: Musculoskeletal - Adult  Goal: Return mobility to safest level of function  Outcome: Progressing  Goal: Return ADL status to a safe level of function  Outcome: Progressing     Problem: Anxiety  Goal: Will report anxiety at manageable levels  Description: INTERVENTIONS:  1. Administer medication as ordered  2. Teach and rehearse alternative coping skills  3. Provide emotional support with 1:1 interaction with staff  Outcome: Progressing     Problem: Coping  Goal: Pt/Family able to verbalize concerns and demonstrate effective coping strategies  Description: INTERVENTIONS:  1. Assist patient/family to  identify coping skills, available support systems and cultural and spiritual values  2. Provide emotional support, including active listening and acknowledgement of concerns of patient and caregivers  3. Reduce environmental stimuli, as able  4. Instruct patient/family in relaxation techniques, as appropriate  5. Assess for spiritual pain/suffering and initiate Spiritual Care, Psychosocial Clinical Specialist consults as needed  Outcome: Progressing     Problem: Confusion  Goal: Confusion, delirium, dementia, or psychosis is improved or at baseline  Description: INTERVENTIONS:  1. Assess for possible contributors to thought disturbance, including medications, impaired vision or hearing, underlying metabolic abnormalities, dehydration, psychiatric diagnoses, and notify attending LIP  2. [place] high risk fall precautions, as indicated  3. Provide frequent short contacts to provide reality reorientation, refocusing and direction  4. Decrease environmental stimuli, including noise as appropriate  5. Monitor and intervene to maintain adequate nutrition, hydration, elimination, sleep and activity  6. If unable to ensure safety without constant attention obtain sitter and review sitter guidelines with assigned personnel  7. Initiate Psychosocial CNS and Spiritual Care consult, as indicated  Outcome: Progressing     Problem: Discharge Planning  Goal: Discharge to home or other facility with appropriate resources  Outcome: Progressing

## 2024-10-03 NOTE — PROGRESS NOTES
note    CT reviewed. No clot in the bladder, sweeney decompressing the bladder. Prostate is very large and bladder very small accounting for difficult irrigation and leaking around catheter. Urine appears more clear today. Continue finasteride on hold eliquis. Consideration of prostatic arterial embolization of his enlarged prostate if we fail to control bleeding. Will follow.       Ham Keene, KELLI  10/03/2024

## 2024-10-03 NOTE — CARE COORDINATION
DISCHARGE PLANNING:   Patient getting  ICD placement today.   DANYELL LUCIANO has initiated the precert-  reference number: [id number] .

## 2024-10-03 NOTE — PROGRESS NOTES
[facility] [place]   Electrophysiology   Date: 10/3/2024  Reason for Follow up: VT   Chief Complaint   Patient presents with    Irregular Heart Beat     Pt from home via [facility] EMS for unstable V-tach, per EMS pt was in vtach but was alert and oriented with SBP in 80s. EMS gave 5 mg versed en route and cardioverted to NSR with PVCs, pt being bagged upon arrival, pulse still present       HPI: Jonathan Dove is a 84 y.o. male with h/o atrial fibrillation/flutter, PVCs, HLD, CVA, mild to moderate CAD.    S/p MVR (31 mm Magna Ease), AVR (25 mm Inspiris), and MALLORY clip in January 2020 at [facility].    Pt was brought to the hospital by EMS after he was found to have wide-complex tachycardia/VT and hypotension and had to be cardioverted en route to the hospital.  Patient has been intubated in the emergency room and has been admitted to the CVU.    He also has history of PVC in the past treated with Amiodarone and declined ablation.     He has been seen for Afib/flutter and had cardioversion at [facility]. Saw EP at [facility] and declined ablation and treated with amiodarone and and had cardioversion.      His ejection fraction got worse over time to 25-30% and Entresto / Farxiga was added and he was scheduled for LHC at [facility]. AICD was discussed with him at [facility].      Family states that he was not feeling well for the past few weeks and he was scheduled for LHC.      He has undergone cardiac catheterization which showed non-obstructive CAD with LV dysfunction with no intervention.     AICD implantation was discussed with him and he wants to proceed with procedure.    On 9/27 pt had imaging positive for cholelithiasis with cholecystitis, he has been on IV antibiotics. Had laparoscopic cholecystectomy 10/1.      Interval History:    Pt seen at bedside for follow up. Clinical notes and telemetry reviewed.  Denies fever, chills, CP, SOB, palpitations, lightheadedness, edema. He is ready to have procedure done today.  81.3 kg (179 lb 3.7 oz)   24 77.6 kg (171 lb)   23 74.3 kg (163 lb 12.8 oz)     Temp  Av.9 °F (36.6 °C)  Min: 97.2 °F (36.2 °C)  Max: 98.2 °F (36.8 °C)  Pulse  Av  Min: 67  Max: 94  BP  Min: 93/67  Max: 128/48  SpO2  Av.9 %  Min: 86 %  Max: 99 %    Intake/Output Summary (Last 24 hours) at 10/3/2024 1141  Last data filed at 10/3/2024 0940  Gross per 24 hour   Intake 375.62 ml   Output -125 ml   Net 500.62 ml       Constitutional: Oriented. No distress.   Cardiovascular: Normal rate, regular rhythm, S1&S2.    Pulmonary/Chest: Bilateral respiratory sounds. No rhonchi.    Abdominal: Soft. No tenderness  Musculoskeletal: No edema    Urological: Urine remains red tinted  Neurological: Alert and oriented. Follows command    Active Hospital Problems    Diagnosis Date Noted    Ventricular tachycardia (HCC) [I47.20] 2024     Priority: High    Acute cholecystitis [K81.0] 2024    Gross hematuria [R31.0] 2024    Acute cholecystitis due to biliary calculus [K80.00] 2024    On mechanically assisted ventilation (HCC) [Z99.11] 2024    Chronic atrial flutter (HCC) [I48.92] 2024    Acute on chronic systolic heart failure (HCC) [I50.23] 2024    Acute respiratory failure with hypoxia [J96.01] 2024    Somnolence [R40.0] 2024    V tach (HCC) [I47.20] 2024    CKD (chronic kidney disease), stage III (HCC) [N18.30] 2019    Essential hypertension [I10] 2015    CAD (coronary artery disease) [I25.10] 2015    Mixed hyperlipidemia [E78.2]        Scheduled Meds:   finasteride  5 mg Oral Daily    carvedilol  3.125 mg Oral BID WC    amiodarone  400 mg Oral Daily    sacubitril-valsartan  0.5 tablet Oral QHS    lactobacillus  1 capsule Oral BID     sodium chloride flush  5-40 mL IntraVENous 2 times per day    piperacillin-tazobactam  3,375 mg IntraVENous Q8H    pantoprazole  40 mg IntraVENous Daily    aspirin  81 mg Oral Daily    [Held by

## 2024-10-03 NOTE — PROGRESS NOTES
UROGRAM    Result Date: 9/26/2024  EXAMINATION: CT UROGRAM 9/26/2024 3:01 pm TECHNIQUE: CT of the abdomen and pelvis was performed before and after the administration of intravenous contrast as per CT urogram protocol. Multiplanar reformatted images as well as MIP urogram images are provided for review. Dose modulation, iterative reconstruction, and/or weight based adjustment of the mA/kV was utilized to reduce the radiation dose to as low as reasonably achievable. COMPARISON: Same day chest radiograph HISTORY: ORDERING SYSTEM PROVIDED HISTORY: hematruia TECHNOLOGIST PROVIDED HISTORY: Reason for exam:->hematruia Additional Contrast?->None Reason for Exam: hematruia FINDINGS: Lower Chest: There is a small right pleural effusion and adjacent right lower lobe atelectasis.  Consolidative opacities in the left lower lobe are suspected to represent pneumonia.  Central venous catheter tip is seen in the SVC.  The heart is within normal limits of size.  No pericardial effusion. Kidneys and Urinary Tract: No suspicious renal lesion.  Left renal sinus and cortical cysts.  There is no suspicious filling defect within opacified portions of the bilateral renal collecting systems and ureters.  There is marked prostatomegaly and median lobe hypertrophy.  Indeterminate soft tissue density filling defects are seen within the bladder on either side of the prostatic median lobe. Organs: The liver is within normal limits.  There is moderate gallbladder distension with associated wall thickening and pericholecystic fluid. Cholelithiasis, including within the cystic duct. The spleen, pancreas, and adrenal glands are unremarkable. GI/Bowel: There is no evidence of bowel obstruction. Pelvis: Marked prostatomegaly as above.  Castañeda catheter in place. Peritoneum/Retroperitoneum: No retroperitoneal, mesenteric, or pelvic lymphadenopathy.  Trace ascites.  No pneumoperitoneum.  Moderate atherosclerosis of the abdominal aorta and iliac arteries  Pre-sedation note completed.  Immediately prior to procedure, patient was reassessed and pre-sedation assessment and strategy remained unchanged.  Post-Sedation After procedure, I personally supervised the patient until awake, alert, following commands, breathing independently and hemodynamically stable. Access RRA US US guidance used to determine artery patency, size (>2mm), anatomic variations and ideal puncture location.  Real-time US utilized concurrent with vascular needle entry into artery.  Image(s) permanently recorded and reported in chart. Bleed Risk Low Sedation Minimal conscious sedation for comfort.  Independent trained observer pushed medications at my direction. Level of consciousness and vital signs/physiologic status monitored throughout the procedure (see start and stop times above, as well as medication dosages). Specimens None Diagnostic Detail RADIAL ACCESS - Patient to cath lab in postabsorptive state, informed consent obtained. Radial site prepped and draped in normal sterile fashion. Needle used to access artery with US guidance and wire advanced into artery.  Sheath advanced over wire into artery.  JL advanced over wire to engage LMCA and JR advanced over wire to engage RCA and image in multiple views. JR/Pigtail advancd over wire into LV and LVEDP obtained, pullback gradient obtained.  Arterial hemostasis obtained with radial band.   CORONARY FINDINGS Artery Findings LM 20% ostial, no dampening, plenty contrast reflux into aorta LAD 40% mid Cx Normal RCA Normal Dominance RCA Dominant LVEDP 15mmHg Normal 3-12mmHg LVG EF N/A d/t renal function Normal >/= 55% LVG WM N/A d/t renal function AVG Normal INTERVENTION(S) None POST CATH  RECOMMENDATIONS Continue medical therapy for NICM/CHF Continue IV amio for VT Rohit as needed Echo in AM CHF consult in AM - med adjustments EP consult in AM - BIVAICD discussion - has historically not been inclined Discussed details of clinical condition and LHC with

## 2024-10-04 NOTE — PROGRESS NOTES
Physical/Occupational Therapy  Jonathan Dove    Attempted PT/OT treatment this date. RN requesting therapy hold at this time due to sweeney catheter bleeding and drainage issues. Will re-attempt treatment session as schedule allows and as pt becomes medically appropriate.     Inna Moody PT, DPT [id number]   Ali Zieverink, OTR/L [id number]

## 2024-10-04 NOTE — PROGRESS NOTES
Jonathan Dove  10/3/2024  [id number]    Chief Complaint: V tach (HCC)    Subjective   Since last seen, medical updates:  - Planning for ICD later today.     Patient reports that he is doing well. Feeling a little tired today. Also noting some discomfort w/ urinary catheter. Aware of plan for ICD later. Remains interested in inpatient rehab.          Objective   Objective:  Patient Vitals for the past 24 hrs:   BP Temp Temp src Pulse Resp SpO2 Weight   10/03/24 2000 -- 97.4 °F (36.3 °C) Temporal 72 14 98 % --   10/03/24 1957 (!) 137/57 -- -- -- -- -- --   10/03/24 1900 124/72 -- -- 72 17 96 % --   10/03/24 1800 -- -- -- 70 21 96 % --   10/03/24 1735 111/75 -- -- 72 18 95 % --   10/03/24 1645 108/66 97 °F (36.1 °C) Temporal 76 15 96 % --   10/03/24 1435 -- -- -- -- -- 92 % --   10/03/24 1203 119/66 97.2 °F (36.2 °C) Temporal 76 23 94 % --   10/03/24 1101 -- -- -- 84 25 95 % --   10/03/24 1059 111/80 -- -- 81 17 90 % --   10/03/24 1000 -- -- -- 73 20 92 % --   10/03/24 0900 -- -- -- 67 14 95 % --   10/03/24 0811 -- -- -- -- 18 95 % --   10/03/24 0800 -- 97.8 °F (36.6 °C) Temporal 79 20 95 % --   10/03/24 0758 (!) 104/55 -- -- 78 22 95 % --   10/03/24 0600 -- -- -- 91 22 97 % --   10/03/24 0545 -- -- -- 85 14 95 % --   10/03/24 0530 -- -- -- 72 19 96 % --   10/03/24 0515 -- -- -- 86 25 98 % --   10/03/24 0500 -- -- -- 71 20 97 % --   10/03/24 0445 -- -- -- 70 16 97 % --   10/03/24 0430 -- -- -- 74 22 99 % --   10/03/24 0415 -- -- -- 71 21 98 % --   10/03/24 0400 -- 97.2 °F (36.2 °C) Temporal 75 27 96 % 81.3 kg (179 lb 3.7 oz)   10/03/24 0345 -- -- -- 72 25 92 % --   10/03/24 0330 -- -- -- 85 20 96 % --   10/03/24 0315 -- -- -- 86 22 95 % --   10/03/24 0300 -- -- -- 83 19 96 % --   10/03/24 0245 -- -- -- 84 19 93 % --   10/03/24 0230 -- -- -- 86 19 94 % --   10/03/24 0215 -- -- -- 85 25 96 % --   10/03/24 0200 -- -- -- 83 27 95 % --   10/03/24 0145 -- -- -- 85 18 94 % --   10/03/24 0130 -- -- -- 86 17 95 % --  nonischemic cardiomyopathy  Acute cholecystitis s/p laparoscopic cholecystectomy  Hematuria    Impairments: Generalized weakness, decreased functional endurance limiting independence with functional mobility and ADLs.     Plan:   - In my opinion the patient will require acute inpatient rehabilitation and meets criteria for [facility] level care, once medically stable per primary and consulting services. Anticipate they will be able to tolerate 3 hours of therapy per day or 900 minutes per week in rehabilitation. The patient requires multidisciplinary rehabilitation treatment including medical management by a PM&R physician, 24 hour rehabilitation nursing, PT/OT, rehabilitation social work, and nutrition services. Patient and family also require education in post-hospital precautions and home exercise routine, adaptive techniques and deficit compensation strategies, strengthening and conditioning, equipment prescription and instructions in use. Provision of services in a less intensive environment risks significant complications and more limited functional outcomes.     Sukhi Bland MD 10/3/2024, 8:43 PM    * This document was created using dictation software.  While all precautions were taken to ensure accuracy, errors may have occurred.  Please disregard any typographical errors.

## 2024-10-04 NOTE — PROGRESS NOTES
Jonathan Dove  10/4/2024  [id number]    Chief Complaint: V tach (HCC)    Subjective   Since last seen, medical updates:  - s/p ICD implantation on 10/3, without complication  - Urology added oxybutynin for bladder spasm  - Zosyn discontinued    Patient reports that he is doing well. Denies fevers/chills, chest pain, dyspnea. Experiencing less bladder discomfort today.          Objective   Objective:  Patient Vitals for the past 24 hrs:   BP Temp Temp src Pulse Resp SpO2 Weight   10/04/24 1645 (!) 105/58 98 °F (36.7 °C) Oral 95 16 91 % --   10/04/24 1445 101/65 97.6 °F (36.4 °C) Oral 89 18 90 % --   10/04/24 1403 -- -- -- 86 20 92 % --   10/04/24 1131 112/73 98 °F (36.7 °C) Temporal 91 29 94 % --   10/04/24 1000 -- -- -- 88 21 100 % --   10/04/24 0807 121/67 97.8 °F (36.6 °C) Temporal 82 23 95 % --   10/04/24 0400 -- 97.5 °F (36.4 °C) Temporal 78 24 96 % --   10/04/24 0000 -- 97.2 °F (36.2 °C) Temporal 69 18 97 % 80.3 kg (177 lb 0.5 oz)   10/03/24 2000 -- 97.4 °F (36.3 °C) Temporal 72 14 98 % --   10/03/24 1957 (!) 137/57 -- -- -- -- -- --   10/03/24 1900 124/72 -- -- 72 17 96 % --     Gen: No distress, pleasant.   HEENT: Normocephalic, atraumatic.   CV: Extremities warm, perfused.  Resp: No respiratory distress. No increased WOB  Abd: Soft, nontender nondistended  : Castañeda w/ continuous bladder irrigation, light red fluid in drainage bag.   Ext: No edema.   Neuro: Alert. Moving bilateral upper and lower extremities fully.   Psych: Calm, pleasant. Affect congruent with stated mood.     Laboratory data: Available via EMR.     Therapy progress:    PT    Rolling: Level of difficulty - A Lot   Sit to Stand from a Chair: Level of difficulty - A Lot  Supine to Sit: Level of difficulty - A Lot     Bed to Chair: Physical Assistance Required - A Lot  Ambulate Across Room: Physical Assistance Required - A Lot  Ascend 3-5 Steps With HR: Physical Assistance Required - A Lot    OT    Eating: Physical Assistance Required - A  Little  Grooming: Physical Assistance Required - A Little  LB Dressing: Physical Assistance Required - A Lot  UB Dressing: Physical Assistance Required - A Little  Bathing: Physical Assistance Required - A Lot  Toileting: Physical Assistance Required - A Lot    SLP         Body mass index is 26.92 kg/m².       Assessment:  Debility due to ventricular tachycardia, nonischemic cardiomyopathy  Acute cholecystitis s/p laparoscopic cholecystectomy  Hematuria    Impairments: Generalized weakness, decreased functional endurance limiting independence with functional mobility and ADLs.     Plan:   - Remains functionally and medically appropriate for acute inpatient rehab, pending insurance prior authorization.   - PM&R will continue to follow.     Sukhi Bland MD 10/4/2024, 6:42 PM    * This document was created using dictation software.  While all precautions were taken to ensure accuracy, errors may have occurred.  Please disregard any typographical errors.

## 2024-10-04 NOTE — PROGRESS NOTES
Report called to Constanza RN, patient to be transferred to Room 5564, Patient notified of pending transfer. Daughter bedside, will continue to monitor.

## 2024-10-04 NOTE — PROGRESS NOTES
Hospitalist Progress Note    Name:  Jonathan Dove    /Age/Sex: 1940  (84 y.o. male)  MRN & CSN:  [id number] & [id number]    PCP: Raphael Huntley Jr., MD    Date of Admission: 2024    Patient Status:  Inpatient     Chief Complaint:   Chief Complaint   Patient presents with    Irregular Heart Beat     Pt from home via [facility] EMS for unstable V-tach, per EMS pt was in vtach but was alert and oriented with SBP in 80s. EMS gave 5 mg versed en route and cardioverted to NSR with PVCs, pt being bagged upon arrival, pulse still present       Hospital Course:   Patient admitted for V tach. Was shocked 1x on admission. Was intubated for cardioversion, now extubated.  Sent for cath nonobstructive, has low EF 30 to 35%.  Also found to have acute cholecystitis but improving with conservative management, patient high risk for surgery and now plan for that.  Will need ICD, but currently being deferred until EP can assure low risk from infectious standpoint.    Has been confused/delirious and was started on Precedex drip.    Subjective:  Today is:  Hospital Day: 11.  Patient seen and examined in [id number].     Arriving back from AICD placement  Mildly sedated from procedure.    Medications:  Reviewed    Infusion Medications    sodium chloride      dexmedeTOMIDine Stopped (24)    sodium chloride      phenylephrine (ARACELI-SYNEPHRINE) 50 mg in sodium chloride 0.9 % 250 mL infusion 30 mcg/min (10/02/24 0023)    lactated ringers IV soln 20 mL/hr at 10/01/24 0729    sodium chloride 800 mL/hr at 10/01/24 1432     Scheduled Medications    oxyBUTYnin  5 mg Oral TID    [START ON 10/5/2024] amiodarone  200 mg Oral Daily    sodium chloride flush  5-40 mL IntraVENous 2 times per day    finasteride  5 mg Oral Daily    carvedilol  3.125 mg Oral BID WC    sacubitril-valsartan  0.5 tablet Oral QHS    lactobacillus  1 capsule Oral BID WC    sodium chloride flush  5-40 mL IntraVENous 2 times per day     monitoring was/is CBC    DVT ppx: Oral anticoagulation - eliquis  GI ppx: PPI  Diet: ADULT DIET; Regular  Code Status: Full Code    PT/OT Eval Status: ordered    Disposition:  Transfer out of CVU       Marcelino Caceres MD  10/4/2024  7:54 PM

## 2024-10-04 NOTE — PROGRESS NOTES
Urology Progress Note  [facility]     Patient: Jonathan Dove MRN: [id number]  Room/Bed: [facility]/2912-01   YOB: 1940  Age/Sex: 84 y.o.male  Admission Date: 9/24/2024     Date of Service:  10/4/2024    ASSESSMENT/PLAN     1. Ventricular tachycardia (HCC)    2. STEMI (ST elevation myocardial infarction) ([facility])    3. Syncope and collapse    4. Acute encephalopathy    5. Septicemia ([facility])    6. Hypokalemia    7. V tach ([facility])    8. Acute cholecystitis    9. HFrEF (heart failure with reduced ejection fraction) ([facility])      84yoM w/ hx of CHF, Chronic Atrial Flutter, Prostetic Mitral valve, CAD s/p CABG. Presented to [facility] on 9/24 in V-tach requiring cardioversion & intubation. Extubated 9/25. Question of possible acute cholecystitis. Urology consulted after amaris blood noted in catheter bag, staff unable to irrigate. On Eliquis and ASA for DVT prevention.      Pt and imaging reviewed with Dr. Krishnamurthy. Likely source of bleeding d/t traumatic sweeney placement through large prostate while on AC. Minimal clot seen on CT urogram.  Has 24f 3 way catheter in place, on CBI.   Underwent cholecystectomy 10/01 with general surgery   ============  Per nurse report, patient had increased leakage around bladder. When asked, the patient reported he was bearing down to try to urinate. Leakage has since improved since patient stopped attempting to urinate with sweeney in. I was able to irrigate >5cc of clot today, no resistance with irrigation.   Urine clear strawberry colored today  Cr 1.0, hgb 11.1    Recommendations:  Will add oxybutynin for bladder spasms at this time  Continue finasteride  Monitor h/h   CT a/p WO yesterday showed no clot in the bladder with sweeney in correct position.  Continue to hold AC until urine clear yellow x24-48 hours. Today urine is clear strawberry colored  Continue CBI, advance as able. Hand irrigate PRN for signs/symptoms of retention   Will continue to follow along    All

## 2024-10-04 NOTE — PROGRESS NOTES
[facility]        Progress Note    Patient Name: Jonathan Dove  MRN: [id number]  YOB: 1940  Date of Evaluation: 10/4/2024    Postoperative Day #2    Subjective:  No acute events overnight  Pain controlled without narcotics  No nausea or vomiting, tolerating regular diet  Passing flatus and stool, less bloating  Resting in bed at this time      Vital Signs:  Patient Vitals for the past 24 hrs:   BP Temp Temp src Pulse Resp SpO2 Weight   10/04/24 1403 -- -- -- 86 20 92 % --   10/04/24 1131 112/73 98 °F (36.7 °C) Temporal 91 29 94 % --   10/04/24 1000 -- -- -- 88 21 100 % --   10/04/24 0807 121/67 97.8 °F (36.6 °C) Temporal 82 23 95 % --   10/04/24 0400 -- 97.5 °F (36.4 °C) Temporal 78 24 96 % --   10/04/24 0000 -- 97.2 °F (36.2 °C) Temporal 69 18 97 % 80.3 kg (177 lb 0.5 oz)   10/03/24 2000 -- 97.4 °F (36.3 °C) Temporal 72 14 98 % --   10/03/24 1957 (!) 137/57 -- -- -- -- -- --   10/03/24 1900 124/72 -- -- 72 17 96 % --   10/03/24 1800 -- -- -- 70 21 96 % --   10/03/24 1735 111/75 -- -- 72 18 95 % --   10/03/24 1645 108/66 97 °F (36.1 °C) Temporal 76 15 96 % --   10/03/24 1435 -- -- -- -- -- 92 % --      TEMPERATURE HISTORY 24H: Temp (24hrs), Av.5 °F (36.4 °C), Min:97 °F (36.1 °C), Max:98 °F (36.7 °C)    BLOOD PRESSURE HISTORY: Systolic (36hrs), Av , Min:104 , Max:137    Diastolic (36hrs), Av, Min:55, Max:80      Intake/Output:  I/O last 3 completed shifts:  In: 365.1 [P.O.:120; IV Piggyback:245.1]  Out: 1950 [Urine:1940; Blood:10]  I/O this shift:  In: 240 [P.O.:240]  Out: -90   Drain/tube Output:       Physical Exam:  General: awake, alert, no acute distress  Lungs: unlabored respirations  Abdomen: soft, nondistended, mild incisional tenderness only, bowel sounds normal   Skin/Wound: healing well, no drainage, no erythema, well approximated    Labs:  CBC:    Recent Labs     10/02/24  1320 10/03/24  0400 10/04/24  0425   WBC 14.0* 10.9 11.4*

## 2024-10-04 NOTE — DISCHARGE INSTRUCTIONS
[facility]    Discharge Instructions      Activity  - activity as tolerated, no driving while on analgesics, and no heavy lifting for 6 weeks; it is OK to be up walking around--walking up and down stairs is also OK. Do what is comfortable: stop and rest if you feel tired.    Diet  - regular diet  - Drink plenty of fluids     Pain  - You may use narcotic pain medication as prescribed for breakthrough pain  - You can use OTC Tylenol or Ibuprofen for 1-2 weeks (may need to adjust the dose as directed on the bottle if enteric coated ibuprofen chosen)  - Avoid taking narcotic pain medication on an empty stomach which may result in nausea, if pain medication is causing nausea try decreasing the dose  - Narcotic pain medication is not necessary, please contact the office if pain is not controlled  - Narcotic pain medication will not be refilled on weekends and after hours  - Please contact the office Monday-Friday 8a-4p if a refill is requested    Nausea  - Avoid taking narcotic pain medication on an empty stomach which may result in nausea  - If pain medication is causing nausea you may try decreasing the dose  - Nausea can be common for 24 hours after surgery--if nausea uncontrolled or worsening, contact the office or go to the ED    Constipation  - Pain medication can be constipating  - Often, it helps to take a stool softener with the goal of at least one soft bowel movement daily with minimal straining  - You may also need to increase water and fiber intake--may supplement with Benefiber and increasing your activity will also be helpful  - If a stool softener is needed, the following OTC medications are recommend: Colace 100 mg by mouth twice daily, Miralax 17 gm by mouth 1-3 times daily with glass of water, dulcolax suppositories, and Fleets enemas    Wound Care  - keep wound clean and dry  - If incisional bleeding is noted, hold firm pressure for 15-20 minutes. If remains  incision site   Pain that you can't control with the medications you've been given   Cough or severe nausea or vomiting     These symptoms are medical emergencies. Call 911 if:     You have chest pain or shortness of breath   You feel lightheaded or dizzy and do not feel a shock   You are still feeling symptoms after a shock   You feel three or more shocks in a row   In case of an emergency call 911.       Follow up Appointment    Follow up appointment in one week for a device and wound check.  Follow up with Dr. Song in 3 months for an office visit and device check.    Call our office to reschedule if there is any conflict with your appointment as it is important to have follow up with your new device.

## 2024-10-04 NOTE — PROGRESS NOTES
[facility]   Electrophysiology   Date: 10/4/2024  Reason for Follow up: VT   Chief Complaint   Patient presents with    Irregular Heart Beat     Pt from home via [facility] for unstable V-tach, per EMS pt was in vtach but was alert and oriented with SBP in 80s. EMS gave 5 mg versed en route and cardioverted to NSR with PVCs, pt being bagged upon arrival, pulse still present       HPI: Jonathan Dove is a 84 y.o. male with h/o atrial fibrillation/flutter, PVCs, HLD, CVA, mild to moderate CAD.    S/p MVR (31 mm Magna Ease), AVR (25 mm Inspiris), and MALLORY clip in January 2020 at [facility].    Pt was brought to the hospital by EMS after he was found to have wide-complex tachycardia/VT and hypotension and had to be cardioverted en route to the hospital.  Patient has been intubated in the emergency room and has been admitted to the CVU.    He also has history of PVC in the past treated with Amiodarone and declined ablation.     He has been seen for Afib/flutter and had cardioversion at [facility]. Saw EP at [facility] and declined ablation and treated with amiodarone and and had cardioversion.      His ejection fraction got worse over time to 25-30% and Entresto / Farxiga was added and he was scheduled for LHC at [facility]. AICD was discussed with him at [facility].      Family states that he was not feeling well for the past few weeks and he was scheduled for LHC.      He has undergone cardiac catheterization which showed non-obstructive CAD with LV dysfunction with no intervention.     AICD implantation was discussed with him and he wants to proceed with procedure.    On 9/27 pt had imaging positive for cholelithiasis with cholecystitis, he has been on IV antibiotics. Had laparoscopic cholecystectomy 10/1.      Interval History:    Pt seen at bedside for follow up. Clinical notes and telemetry reviewed.  Denies fever, chills, CP, SOB, palpitations, lightheadedness, edema. Had ICD placed yesterday, tolerated  chloride 800 mL/hr at 10/01/24 1432     PRN Meds:.sodium chloride flush, sodium chloride, acetaminophen **OR** acetaminophen, sodium chloride flush, sodium chloride, benzocaine-menthol, potassium chloride, sodium chloride flush, sodium chloride, potassium chloride **OR** potassium alternative oral replacement **OR** potassium chloride, magnesium sulfate, ondansetron **OR** ondansetron, polyethylene glycol     Prior to Admission medications    Medication Sig Start Date End Date Taking? Authorizing Provider   sacubitril-valsartan (ENTRESTO) 24-26 MG per tablet Take 1 tablet by mouth 2 times daily   Yes Robin Bhandari MD   lovastatin (MEVACOR) 20 MG tablet TAKE ONE TABLET BY MOUTH ONCE NIGHTLY  Patient taking differently: Take 1 tablet by mouth nightly 8/12/24  Yes Raphael Huntley Jr., MD   dapagliflozin (FARXIGA) 10 MG tablet Take 1 tablet by mouth every morning 1/19/24  Yes Robin Bhandari MD   apixaban (ELIQUIS) 2.5 MG TABS tablet Take 1 tablet by mouth 2 times daily   Yes Robin Bhandari MD   betamethasone valerate (VALISONE) 0.1 % cream APPLY TOPICALLY TWO TIMES A DAY  Patient taking differently: as needed As needed 10/13/21  Yes Raphael Huntley Jr., MD   metoprolol succinate (TOPROL XL) 25 MG extended release tablet TAKE ONE TABLET BY MOUTH DAILY  Patient taking differently: Take 1 tablet by mouth daily 9/8/20  Yes Raphael Huntley Jr., MD   Netarsudil-Latanoprost (ROCKLATAN) 0.02-0.005 % SOLN Place 1 drop into both eyes every evening   Yes Robin Bhandari MD   tamsulosin (FLOMAX) 0.4 MG capsule Take 1 capsule by mouth daily   Yes Robin Bhandari MD   MULTIPLE VITAMIN PO Take 1 tablet by mouth daily    Yes Robin Bhandari MD   Cholecalciferol (VITAMIN D3) 1.25 MG (57354 UT) TABS Take 1 capsule by mouth daily    Yes Robin Bhandari MD   polyethylene glycol (GLYCOLAX) packet Take 1 packet by mouth every 48 hours as needed   Yes Robin Bhandari MD

## 2024-10-04 NOTE — PLAN OF CARE
Problem: Pain  Goal: Verbalizes/displays adequate comfort level or baseline comfort level  Outcome: Progressing  Flowsheets (Taken 9/29/2024 1800 by Lizette Hernandez RN)  Verbalizes/displays adequate comfort level or baseline comfort level: Encourage patient to monitor pain and request assistance  Note: Patient only complains of occasional pain in genitals and bladder, has not requested pain medication for this pain yet.     Problem: Safety - Adult  Goal: Free from fall injury  Outcome: Progressing  Note: Awaiting PT evaluation to get patient out of bed.  At this time patient needs to be reminded to turn self and occasionally needs help.     Problem: ABCDS Injury Assessment  Goal: Absence of physical injury  Outcome: Progressing  Note: Limb alert band placed on left arm, patient instructed not to lift arm.     Problem: Respiratory - Adult  Goal: Achieves optimal ventilation and oxygenation  Outcome: Progressing  Note: Patient is not complaining of shortness of breath and has not required supplemental oxygen today.

## 2024-10-04 NOTE — FLOWSHEET NOTE
10/04/24 0800   Urinary Catheter 09/26/24 3 Way   Placement Date/Time: 09/26/24 1700   Present on Admission/Arrival: No  Inserted by: J.Frankel  2nd Staff Assisting: MC  Insertion attempts: 1  Catheter Type: 3 Way  Catheter Size: 24 FR  Catheter Balloon Size: (c) Other (Comment)  Insertion Procedure ...   CBI Irrigation Intake (mL) 6000 mL   CBI Sweeney Output (mL) 5660 mL   CBI Net Output (mL) -340 mL     Patient continuous leaking around sweeney, Urine bloody, Urology J. Frankel bedside PA bedside, Will continue to monitor.

## 2024-10-04 NOTE — PLAN OF CARE
Problem: Pain  Goal: Verbalizes/displays adequate comfort level or baseline comfort level  Outcome: Progressing     Problem: Chronic Conditions and Co-morbidities  Goal: Patient's chronic conditions and co-morbidity symptoms are monitored and maintained or improved  Outcome: Progressing     Problem: Safety - Adult  Goal: Free from fall injury  Outcome: Progressing     Problem: ABCDS Injury Assessment  Goal: Absence of physical injury  Outcome: Progressing     Problem: Respiratory - Adult  Goal: Achieves optimal ventilation and oxygenation  Outcome: Progressing     Problem: Cardiovascular - Adult  Goal: Maintains optimal cardiac output and hemodynamic stability  Outcome: Progressing  Goal: Absence of cardiac dysrhythmias or at baseline  Outcome: Progressing     Problem: Genitourinary - Adult  Goal: Absence of urinary retention  Outcome: Progressing  Goal: Urinary catheter remains patent  Outcome: Progressing     Problem: Metabolic/Fluid and Electrolytes - Adult  Goal: Electrolytes maintained within normal limits  Outcome: Progressing  Goal: Hemodynamic stability and optimal renal function maintained  Outcome: Progressing     Problem: Neurosensory - Adult  Goal: Achieves stable or improved neurological status  Outcome: Progressing  Goal: Achieves maximal functionality and self care  Outcome: Progressing     Problem: Musculoskeletal - Adult  Goal: Return mobility to safest level of function  Outcome: Progressing  Goal: Return ADL status to a safe level of function  Outcome: Progressing     Problem: Anxiety  Goal: Will report anxiety at manageable levels  Description: INTERVENTIONS:  1. Administer medication as ordered  2. Teach and rehearse alternative coping skills  3. Provide emotional support with 1:1 interaction with staff  Outcome: Progressing     Problem: Coping  Goal: Pt/Family able to verbalize concerns and demonstrate effective coping strategies  Description: INTERVENTIONS:  1. Assist patient/family to

## 2024-10-05 NOTE — PLAN OF CARE
nutritional intake  10/5/2024 1308 by Evie Powers RN  Outcome: Progressing  10/5/2024 0034 by Karie Mcclellan LPN  Outcome: Progressing     Problem: Anxiety  Goal: Will report anxiety at manageable levels  Description: INTERVENTIONS:  1. Administer medication as ordered  2. Teach and rehearse alternative coping skills  3. Provide emotional support with 1:1 interaction with staff  10/5/2024 1308 by Evie Powers RN  Outcome: Progressing  10/5/2024 0034 by Karie Mcclellan LPN  Outcome: Progressing     Problem: Coping  Goal: Pt/Family able to verbalize concerns and demonstrate effective coping strategies  Description: INTERVENTIONS:  1. Assist patient/family to identify coping skills, available support systems and cultural and spiritual values  2. Provide emotional support, including active listening and acknowledgement of concerns of patient and caregivers  3. Reduce environmental stimuli, as able  4. Instruct patient/family in relaxation techniques, as appropriate  5. Assess for spiritual pain/suffering and initiate Spiritual Care, Psychosocial Clinical Specialist consults as needed  10/5/2024 1308 by Evie Powers RN  Outcome: Progressing  10/5/2024 0034 by Karie Mcclellan LPN  Outcome: Progressing     Problem: Confusion  Goal: Confusion, delirium, dementia, or psychosis is improved or at baseline  Description: INTERVENTIONS:  1. Assess for possible contributors to thought disturbance, including medications, impaired vision or hearing, underlying metabolic abnormalities, dehydration, psychiatric diagnoses, and notify attending LIP  2. [place] high risk fall precautions, as indicated  3. Provide frequent short contacts to provide reality reorientation, refocusing and direction  4. Decrease environmental stimuli, including noise as appropriate  5. Monitor and intervene to maintain adequate nutrition, hydration, elimination, sleep and activity  6. If  unable to ensure safety without constant attention obtain sitter and review sitter guidelines with assigned personnel  7. Initiate Psychosocial CNS and Spiritual Care consult, as indicated  10/5/2024 1308 by Evie Powers RN  Outcome: Progressing  10/5/2024 0034 by Karie Mcclellan LPN  Outcome: Progressing     Problem: Discharge Planning  Goal: Discharge to home or other facility with appropriate resources  10/5/2024 1308 by Evie Powers RN  Outcome: Progressing  10/5/2024 0034 by Karie Mcclellan LPN  Outcome: Progressing     Problem: Nutrition Deficit:  Goal: Optimize nutritional status  10/5/2024 1308 by Evie Powers RN  Outcome: Progressing  10/5/2024 0034 by Karie Mcclellan LPN  Outcome: Progressing     Problem: Skin/Tissue Integrity  Goal: Absence of new skin breakdown  Description: 1.  Monitor for areas of redness and/or skin breakdown  2.  Assess vascular access sites hourly  3.  Every 4-6 hours minimum:  Change oxygen saturation probe site  4.  Every 4-6 hours:  If on nasal continuous positive airway pressure, respiratory therapy assess nares and determine need for appliance change or resting period.  10/5/2024 1308 by Evie Powers RN  Outcome: Progressing  10/5/2024 0034 by Karie Mcclellan LPN  Outcome: Progressing     Problem: Skin/Tissue Integrity - Adult  Goal: Skin integrity remains intact  10/5/2024 1308 by Evie Powers RN  Outcome: Progressing  10/5/2024 0034 by Karie Mcclellan LPN  Outcome: Progressing

## 2024-10-05 NOTE — PROGRESS NOTES
Shift assessment complete. VSS. Patient resting in bed in fowlers position. Respirations even and unlabored on room air.  Hs meds adminitered. Call light within reach. Fall precautions in place. Bed in low, locked position. No additional needs noted at this time.

## 2024-10-05 NOTE — PLAN OF CARE
Problem: Pain  Goal: Verbalizes/displays adequate comfort level or baseline comfort level  10/5/2024 0034 by Karie Mcclellan LPN  Outcome: Progressing  10/4/2024 1708 by Constanza Hall RN  Outcome: Progressing  Flowsheets (Taken 9/29/2024 1800 by Lizette Hernandez RN)  Verbalizes/displays adequate comfort level or baseline comfort level: Encourage patient to monitor pain and request assistance  Note: Patient only complains of occasional pain in genitals and bladder, has not requested pain medication for this pain yet.     Problem: Chronic Conditions and Co-morbidities  Goal: Patient's chronic conditions and co-morbidity symptoms are monitored and maintained or improved  Outcome: Progressing     Problem: Safety - Adult  Goal: Free from fall injury  10/5/2024 0034 by Karie Mcclellan LPN  Outcome: Progressing  10/4/2024 1708 by Constanza Hall RN  Outcome: Progressing  Note: Awaiting PT evaluation to get patient out of bed.  At this time patient needs to be reminded to turn self and occasionally needs help.     Problem: ABCDS Injury Assessment  Goal: Absence of physical injury  10/5/2024 0034 by Karie Mcclellan LPN  Outcome: Progressing  10/4/2024 1708 by Constanza Hall RN  Outcome: Progressing  Note: Limb alert band placed on left arm, patient instructed not to lift arm.     Problem: Respiratory - Adult  Goal: Achieves optimal ventilation and oxygenation  10/5/2024 0034 by Karie Mcclellan LPN  Outcome: Progressing  10/4/2024 1708 by Constanza Hall RN  Outcome: Progressing  Note: Patient is not complaining of shortness of breath and has not required supplemental oxygen today.     Problem: Cardiovascular - Adult  Goal: Maintains optimal cardiac output and hemodynamic stability  Outcome: Progressing  Goal: Absence of cardiac dysrhythmias or at baseline  Outcome: Progressing     Problem: Genitourinary - Adult  Goal: Absence of urinary retention  Outcome: Progressing  Goal: Urinary catheter  remains patent  Outcome: Progressing     Problem: Metabolic/Fluid and Electrolytes - Adult  Goal: Electrolytes maintained within normal limits  Outcome: Progressing  Goal: Hemodynamic stability and optimal renal function maintained  Outcome: Progressing     Problem: Neurosensory - Adult  Goal: Achieves stable or improved neurological status  Outcome: Progressing     Problem: Musculoskeletal - Adult  Goal: Return mobility to safest level of function  Outcome: Progressing  Goal: Return ADL status to a safe level of function  Outcome: Progressing     Problem: Gastrointestinal - Adult  Goal: Minimal or absence of nausea and vomiting  Outcome: Progressing  Goal: Maintains or returns to baseline bowel function  Outcome: Progressing  Goal: Maintains adequate nutritional intake  Outcome: Progressing     Problem: Anxiety  Goal: Will report anxiety at manageable levels  Description: INTERVENTIONS:  1. Administer medication as ordered  2. Teach and rehearse alternative coping skills  3. Provide emotional support with 1:1 interaction with staff  Outcome: Progressing     Problem: Coping  Goal: Pt/Family able to verbalize concerns and demonstrate effective coping strategies  Description: INTERVENTIONS:  1. Assist patient/family to identify coping skills, available support systems and cultural and spiritual values  2. Provide emotional support, including active listening and acknowledgement of concerns of patient and caregivers  3. Reduce environmental stimuli, as able  4. Instruct patient/family in relaxation techniques, as appropriate  5. Assess for spiritual pain/suffering and initiate Spiritual Care, Psychosocial Clinical Specialist consults as needed  Outcome: Progressing     Problem: Confusion  Goal: Confusion, delirium, dementia, or psychosis is improved or at baseline  Description: INTERVENTIONS:  1. Assess for possible contributors to thought disturbance, including medications, impaired vision or hearing, underlying

## 2024-10-05 NOTE — PROGRESS NOTES
Hospitalist Progress Note    Name:  Jonathan Dove    /Age/Sex: 1940  (84 y.o. male)  MRN & CSN:  [id number] & [id number]    PCP: Raphael Huntley Jr., MD    Date of Admission: 2024    Patient Status:  Inpatient     Chief Complaint:   Chief Complaint   Patient presents with    Irregular Heart Beat     Pt from home via [facility] for unstable V-tach, per EMS pt was in vtach but was alert and oriented with SBP in 80s. EMS gave 5 mg versed en route and cardioverted to NSR with PVCs, pt being bagged upon arrival, pulse still present       Hospital Course:   Patient admitted for V tach. Was shocked 1x on admission. Was intubated for cardioversion, now extubated.  Sent for cath nonobstructive, has low EF 30 to 35%.  Also found to have acute cholecystitis but improving with conservative management, patient high risk for surgery and now plan for that.  Will need ICD, but currently being deferred until EP can assure low risk from infectious standpoint.    Has been confused/delirious and was started on Precedex drip.    Subjective:  Today is:  Hospital Day: 12.  Patient seen and examined in 5TN-5564/5564-01.     Patient feels well  Urine blood-tinged today  No nausea vomiting  Medications:  Reviewed      Intake/Output Summary (Last 24 hours) at 10/5/2024 1609  Last data filed at 10/5/2024 1408  Gross per 24 hour   Intake 130 ml   Output 23190 ml   Net -59648 ml       Physical Exam Performed:    BP (!) 102/58   Pulse 80   Temp 97.8 °F (36.6 °C) (Oral)   Resp 18   Ht 1.727 m (5' 7.99\")   Wt 80.7 kg (177 lb 14.4 oz)   SpO2 91%   BMI 27.06 kg/m²     General appearance: Alert and oriented.  No acute distress.  HEENT: Pupils equal, round, and reactive to light. Conjunctivae/corneas clear. NC present.  Neck: Supple, with full range of motion. No jugular venous distention. Trachea midline.  Respiratory:  Normal respiratory effort. Clear to auscultation, bilaterally without

## 2024-10-05 NOTE — PROGRESS NOTES
Urology Progress Note  [facility]     Patient: Jonathan Dove MRN: [id number]  Room/Bed: 5TN-5564/5564-01   YOB: 1940  Age/Sex: 84 y.o.male  Admission Date: 9/24/2024     Date of Service:  10/5/2024    ASSESSMENT/PLAN     1. Ventricular tachycardia (HCC)    2. STEMI (ST elevation myocardial infarction) ([facility])    3. Syncope and collapse    4. Acute encephalopathy    5. Septicemia ([facility])    6. Hypokalemia    7. V tach ([facility])    8. Acute cholecystitis    9. HFrEF (heart failure with reduced ejection fraction) ([facility])      84yoM w/ hx of CHF, Chronic Atrial Flutter, Prostetic Mitral valve, CAD s/p CABG. Presented to [facility] on 9/24 in V-tach requiring cardioversion & intubation. Extubated 9/25. Question of possible acute cholecystitis. Urology consulted after amaris blood noted in catheter bag, staff unable to irrigate. On Eliquis and ASA for DVT prevention.      Pt and imaging reviewed with Dr. Krishnamurthy. Likely source of bleeding d/t traumatic sweeney placement through large prostate while on AC. Minimal clot seen on CT urogram.  Has 24f 3 way catheter in place, on CBI.   Underwent cholecystectomy 10/01 with general surgery   ============  Patient still had some pain overnight, CBI was reopened overnight. Currently draining well.    Urine clear strawberry colored today  Cr 1.0, hgb 11.1    Recommendations:  Continue oxybutynin for bladder spasms at this time  Continue finasteride  Monitor h/h   CT a/p WO yesterday showed no clot in the bladder with sweeney in correct position.  Continue to hold AC until urine clear yellow x24-48 hours. Today urine is clear strawberry colored again  Continue CBI, advance as able. Hand irrigate PRN for signs/symptoms of retention   Will make NPO at midnight and reassess improvement in AM  Will continue to follow along    All patient questions were answered. He understands the plan as listed above.    SUBJECTIVE     Chief Complaint:   Chief Complaint   Patient presents  abnormality. Mild generalized cerebral volume loss and moderate chronic microvascular ischemic changes of the periventricular and subcortical white matter.     XR CHEST PORTABLE    Result Date: 9/24/2024  EXAMINATION: ONE XRAY VIEW OF THE CHEST 9/24/2024 8:03 pm COMPARISON: Chest x-ray dated 03/13/2010. HISTORY: ORDERING SYSTEM PROVIDED HISTORY: v tach, cardioversion, S/P intubation TECHNOLOGIST PROVIDED HISTORY: Reason for exam:->v tach, cardioversion, S/P intubation FINDINGS: Medical devices: Endotracheal tube is noted with its tip approximately 3.8 cm from the yunior.  Cardiac valve replacements are noted.  Defibrillator pads are noted over the right upper chest and left upper abdomen. Heart/Mediastinum: The cardiac silhouette appears enlarged. Pleura/Lungs.  There is a small left pleural effusion and/or basilar atelectasis..  No appreciable pneumothorax. Bones/soft tissue: No acute abnormality.  There are median sternotomy wires.     1. Endotracheal tube is noted with its tip approximately 3.8 cm from the yunior. 2. Small left pleural effusion and/or basilar atelectasis.            Electronically signed by: John W Frankel, PA-C, 10/5/2024 1:33 PM  [facility]  Office Contact: [phone]

## 2024-10-06 NOTE — PLAN OF CARE
Problem: Pain  Goal: Verbalizes/displays adequate comfort level or baseline comfort level  10/6/2024 0031 by Karie Mcclellan LPN  Outcome: Progressing  10/6/2024 0031 by Karie Mcclellan LPN  Outcome: Progressing  10/5/2024 1308 by Evie Powers RN  Outcome: Progressing     Problem: Chronic Conditions and Co-morbidities  Goal: Patient's chronic conditions and co-morbidity symptoms are monitored and maintained or improved  10/6/2024 0031 by Karie Mcclellan LPN  Outcome: Progressing  10/6/2024 0031 by Karie Mcclellan LPN  Outcome: Progressing  10/5/2024 1308 by Evie Powers RN  Outcome: Progressing     Problem: Safety - Adult  Goal: Free from fall injury  10/6/2024 0031 by Karie Mcclellan LPN  Outcome: Progressing  10/6/2024 0031 by Karie Mcclellan LPN  Outcome: Progressing  10/5/2024 1308 by Evie Powers RN  Outcome: Progressing     Problem: ABCDS Injury Assessment  Goal: Absence of physical injury  10/6/2024 0031 by Karie Mcclellan LPN  Outcome: Progressing  10/6/2024 0031 by Karie Mcclellan LPN  Outcome: Progressing  10/5/2024 1308 by Evie Powers RN  Outcome: Progressing     Problem: Respiratory - Adult  Goal: Achieves optimal ventilation and oxygenation  10/6/2024 0031 by Karie Mcclellan LPN  Outcome: Progressing  10/6/2024 0031 by Karie Mcclellan LPN  Outcome: Progressing  10/5/2024 1308 by Evie Powers RN  Outcome: Progressing     Problem: Cardiovascular - Adult  Goal: Maintains optimal cardiac output and hemodynamic stability  10/6/2024 0031 by Karie Mcclellan LPN  Outcome: Progressing  10/5/2024 1308 by Evie Powers, SHRUTI  Outcome: Progressing  Goal: Absence of cardiac dysrhythmias or at baseline  10/6/2024 0031 by Karie Mcclellan LPN  Outcome: Progressing  10/5/2024 1308 by Evie Powers, SHRUTI  Outcome:

## 2024-10-06 NOTE — PROGRESS NOTES
Urology Progress Note  [facility]    Provider: Igor Barroso MD  Patient ID:  Admission Date: 2024 Name: Jonathan Dove  OR Date: 2024 MRN: [id number]   Patient Location: 5TN-5564/5564-01 : 1940  Attending: Valeriy Gold MD Date of Service: 10/6/2024  PCP: Raphael Huntley Jr., MD     Diagnoses:  Gross hematuria  Urinary retention in the setting of critical illness  BPH  Bladder spasms with pericatheter urethral incontinence    Assessment/Plan:  Gross hematuria -this is mostly catheter related almost certainly and due to his large prostate.  Hold Eliquis as able.  We will continue CBI today.  His catheter is draining absolutely fine currently.    Urinary retention -we will attempt a voiding trial likely tomorrow morning.  Continue finasteride.  I added tamsulosin today.  Try to have the patient ambulating and keep bowels moving.  BPH -patient has a very large prostate gland on CT scan.  Finasteride and tamsulosin as above.  He will need outpatient follow-up.  Bladder spasms -catheter is draining fine.  Oxybutynin as needed    Anticipate discharge when the above goals have been met    The patient had a chance to ask questions which were answered. He understands the above plan.    Subjective:   No event  No complaints  AFVSS      Objective:   Vitals:  Vitals:    10/06/24 0900   BP: 124/75   Pulse: 90   Resp: 16   Temp: 97.9 °F (36.6 °C)   SpO2: 94%       Intake/Output Summary (Last 24 hours) at 10/6/2024 1107  Last data filed at 10/6/2024 0359  Gross per 24 hour   Intake --   Output 3270 ml   Net -3270 ml       Physical Exam:  White male in no acute distress  Pleasant and conversational  Nontoxic  Abdomen benign  Penis uncircumcised  Three-way Castañeda catheter in place with some dark red urine but the CBI is running slowly.  I turned it up and it runs fine.    Labs:  Lab Results   Component Value Date    WBC 11.5 (H) 10/06/2024    HGB 9.4 (L) 10/06/2024    HCT 28.6 (L)

## 2024-10-06 NOTE — PROGRESS NOTES
Shift assessment complete. VSS. Patient resting in bed in high fowlers position. Respirations even and unlabored on room air. Call light within reach. Fall precautions in place. Bed in low, locked position. No additional needs noted at this time.

## 2024-10-06 NOTE — PROGRESS NOTES
Result   1. Endotracheal tube is noted with its tip approximately 3.8 cm from the   yunior.   2. Small left pleural effusion and/or basilar atelectasis.                 Assessment/Plan:    Active Hospital Problems    Diagnosis     Acute cholecystitis [K81.0]     Gross hematuria [R31.0]     Acute cholecystitis due to biliary calculus [K80.00]     On mechanically assisted ventilation (HCC) [Z99.11]     Chronic atrial flutter (HCC) [I48.92]     Acute on chronic systolic heart failure (HCC) [I50.23]     Acute respiratory failure with hypoxia [J96.01]     Somnolence [R40.0]     V tach (HCC) [I47.20]     Ventricular tachycardia (HCC) [I47.20]     CKD (chronic kidney disease), stage III (HCC) [N18.30]     Essential hypertension [I10]     CAD (coronary artery disease) [I25.10]     Mixed hyperlipidemia [E78.2]          Hospital Day: 13    This is a 84 y.o. male who presented to [facility] on 9/24/2024 and is being treated for:    Vtach/NICM  -Cardioversion x1 in ED  -Heart cath normal, echocardiogram EF 30 to 35%  -If pressors are needed, use flako; keep MAP >65  -Will likely need pacemaker/defibrillator placed, EP will place prior to discharge  -Daily labs; replace electrolytes as needed. Keep k >4, mag >2  Continue amiodarone  -Continue Entresto  -Cardiology consulted  S/p AICD placement on 10/4  Site looks clean    Acute cholecystitis  -US show acute cholecysitis and cholelithiasis  -N.p.o. at midnight  -s/p lap di on 10/1,  Doing well postoperatively    Acute confusion/delirium  -Likely 2/2 infection versus ICU delirium  Currently patient awake alert oriented    Hematuria,gross  -Likely traumatic sweeney  -Continues to have bloody urine  -Continue bladder irrigation  -AC on hold   -Urology following  The plan is to continue CBI for today t continue finasteride tamsulosin added  The plan is to give him a trial without catheter tomorrow    New onset acute systolic heart failure  -Probably 2/2 above  -Echo 9/25/24  showed LVEF 30-35%  -[facility] wnl  -I/Os    CKD III  -Creatinine 1.6 on admission; baseline approximately 1.3-1.7  -Avoid nephrotoxins  -Daily labs; trend creatinine    Chronic atrial flutter  -Holding eliquis for procedures and hematuria    On mechanical ventilation - resolved  -Extubated on 9/25  Off oxygen  Hypertension  -Continue home antihypertensives    Hyperlipidemia  -Continue home statin          Drugs that require monitoring for toxicity include: Eliquis and the method of monitoring was/is CBC    DVT ppx: Oral anticoagulation - eliquis  GI ppx: PPI  Diet: ADULT DIET; Regular  Code Status: Full Code        Valeriy Gold MD  10/6/2024  1:18 PM

## 2024-10-06 NOTE — PLAN OF CARE
Problem: Pain  Goal: Verbalizes/displays adequate comfort level or baseline comfort level  10/6/2024 0031 by Karie Mcclellan LPN  Outcome: Progressing  10/6/2024 0031 by Karie Mcclellan LPN  Outcome: Progressing

## 2024-10-06 NOTE — PROGRESS NOTES
0820- Patient's sweeney that was placed by urology that is leaking a great amount and has been leaking all night per previous RN.  MD/Chio notified via secure message, awaiting response.    1200-MD/Urology at bedside. Update of care discussed with patient. Claudio notified of urology's plan of care. New order noted to resume diet. Diet resumed, patient assisted to order lunch.    1530- Family at bedside and wants clarification of urology's plan of care. Madelyn notified via secure message and informed this writer to keep CBI at a slow rate right now and he plans to d/c it tomorrow. Family informed, verbal understanding stated.

## 2024-10-07 ENCOUNTER — NURSE ONLY (OUTPATIENT)
Dept: CARDIOLOGY CLINIC | Age: 84
End: 2024-10-07

## 2024-10-07 DIAGNOSIS — Z95.810 ICD (IMPLANTABLE CARDIOVERTER-DEFIBRILLATOR), DUAL, IN SITU: Primary | ICD-10-CM

## 2024-10-07 DIAGNOSIS — I47.20 VENTRICULAR TACHYCARDIA (HCC): ICD-10-CM

## 2024-10-07 DIAGNOSIS — I25.5 ISCHEMIC CARDIOMYOPATHY: ICD-10-CM

## 2024-10-07 DIAGNOSIS — I51.9 LV DYSFUNCTION: ICD-10-CM

## 2024-10-07 NOTE — PLAN OF CARE
Problem: Pain  Goal: Verbalizes/displays adequate comfort level or baseline comfort level  Outcome: Progressing  Flowsheets  Taken 10/6/2024 2329  Verbalizes/displays adequate comfort level or baseline comfort level:   Encourage patient to monitor pain and request assistance   Assess pain using appropriate pain scale   Administer analgesics based on type and severity of pain and evaluate response   Implement non-pharmacological measures as appropriate and evaluate response   Consider cultural and social influences on pain and pain management   Notify Licensed Independent Practitioner if interventions unsuccessful or patient reports new pain    Problem: Chronic Conditions and Co-morbidities  Goal: Patient's chronic conditions and co-morbidity symptoms are monitored and maintained or improved  Outcome: Progressing  Flowsheets  Taken 10/6/2024 2329  Care Plan - Patient's Chronic Conditions and Co-Morbidity Symptoms are Monitored and Maintained or Improved:   Monitor and assess patient's chronic conditions and comorbid symptoms for stability, deterioration, or improvement   Collaborate with multidisciplinary team to address chronic and comorbid conditions and prevent exacerbation or deterioration   Update acute care plan with appropriate goals if chronic or comorbid symptoms are exacerbated and prevent overall improvement and discharge    Problem: Safety - Adult  Goal: Free from fall injury  10/6/2024 2329 by Adelia Lux, RN  Outcome: Progressing  Flowsheets (Taken 10/6/2024 2329)  Free From Fall Injury:   Instruct family/caregiver on patient safety   Based on caregiver fall risk screen, instruct family/caregiver to ask for assistance with transferring infant if caregiver noted to have fall risk factors  10/6/2024 1155 by Irasema Colin, RN  Outcome: Progressing     Problem: Respiratory - Adult  Goal: Achieves optimal ventilation and oxygenation  Outcome: Progressing  Flowsheets (Taken 10/6/2024 2329)  Achieves

## 2024-10-07 NOTE — PROGRESS NOTES
[facility] - Inpatient Rehabilitation Department   Phone: [phone]    Occupational Therapy    [] Initial Evaluation            [x] Daily Treatment Note         [] Discharge Summary      Patient: Jonathan Dove   : 1940   MRN: [id number]   Date of Service:  10/7/2024    Admitting Diagnosis:  V tach (HCC)  Current Admission Summary:84 y.o. male with PMHx notable for HTN, HLD, Afib, nonischemic cardiomyopathy, CAD s/p CABG, s/p AVR and MVR, CVA  who presented on  with unstable ventricular tachycardia.  He was cardioverted by EMS.  He was intubated for airway protection.  He underwent cardiac catheterization which showed nonobstructive CAD, no intervention was performed.  Cardiology was consulted, recommended IV amiodarone which was eventually transition to oral.  AICD implantation was discussed, patient was agreeable however he developed acute cholecystitis and acute mental status changes which delayed this procedure.  He briefly required Precedex drip, but no longer needed.  General surgery was consulted, and patient is now s/p laparoscopic cholecystectomy on 10/1. ICD implantation on 10/3.  Hospital course complicated by: hematuria, HTN.         Past Medical History:  has a past medical history of Acute, but ill-defined, cerebrovascular disease, Cancer (HCC), Degeneration of cervical intervertebral disc, Degeneration of lumbar or lumbosacral intervertebral disc, Enlarged prostate, Irritable bowel syndrome, Proteinuria, Pure hypercholesterolemia, and Unspecified cerebral artery occlusion with cerebral infarction.  Past Surgical History:  has a past surgical history that includes back surgery; Cardiac catheterization; Aortic mitral valve replacement; Cardiac procedure (N/A, 2024); Cholecystectomy, laparoscopic (N/A, 10/1/2024); and ep device procedure (N/A, 10/3/2024).    Discharge Recommendations: Jonathan Dove scored a  on the AM-PAC ADL Inpatient form. Current research shows  training, safety education, and equipment evaluation/education    Goals    Short Term Goals:  Time Frame: by discharge  Patient will complete upper body ADL at stand by assistance   Patient will complete lower body ADL at minimal assistance   Patient will complete toileting at contact guard assistance   Patient will complete grooming at stand by assistance   Patient will complete functional transfers at minimal assistance   Patient will complete functional mobility at minimal assistance     Above goals reviewed on 10/7/2024.  All goals are ongoing at this time unless indicated above.       Therapy Session Time     Individual Group Co-treatment   Time In 1122  1039   Time Out 1147  1122   Minutes 25  43        Timed Code Treatment Minutes:   68      Total Treatment Minutes:  68     Electronically Signed By: Lizeth LAYTON OTR/L [id number]

## 2024-10-07 NOTE — PROGRESS NOTES
RR called 2/2 HR > 170s. Patient admitted for v tach and and subsequently had ICD placed.  Patient given metoprolol during rapid with minor improvement in heart rate to the 150s however blood pressure became very low at that time.  Given that patient was still in V. tach and becoming hypotensive patient was cardioverted at 200 J successfully x 1.  Patient was subsequently transferred down to the CVICU.  Patient was not started on amnio drip as he was in normal sinus rhythm after cardioversion.  Spoke with cardiology NP on-call and stated that patient will likely need to have ICD interrogated or have threshold raise that to avoid subsequent episodes of V. tach.

## 2024-10-07 NOTE — PROGRESS NOTES
[facility]   Electrophysiology   Date: 10/7/2024  Reason for Follow up: VT   Chief Complaint   Patient presents with    Irregular Heart Beat     Pt from home via [facility] for unstable V-tach, per EMS pt was in vtach but was alert and oriented with SBP in 80s. EMS gave 5 mg versed en route and cardioverted to NSR with PVCs, pt being bagged upon arrival, pulse still present       HPI: Jonathan Dove is a 84 y.o. male with h/o atrial fibrillation/flutter, PVCs, HLD, CVA, mild to moderate CAD.    S/p MVR (31 mm Magna Ease), AVR (25 mm Inspiris), and MALLORY clip in January 2020 at [facility].    Pt was brought to the hospital by EMS after he was found to have wide-complex tachycardia/VT and hypotension and had to be cardioverted en route to the hospital.  Patient has been intubated in the emergency room and has been admitted to the CVU.    He also has history of PVC in the past treated with Amiodarone and declined ablation.     He has been seen for Afib/flutter and had cardioversion at [facility]. Saw EP at [facility] and declined ablation and treated with amiodarone and and had cardioversion.      His ejection fraction got worse over time to 25-30% and Entresto / Farxiga was added and he was scheduled for LHC at [facility]. AICD was discussed with him at [facility].      Family states that he was not feeling well for the past few weeks and he was scheduled for LHC.      He has undergone cardiac catheterization which showed non-obstructive CAD with LV dysfunction with no intervention.     AICD implantation was discussed with him and he wants to proceed with procedure.    On 9/27 pt had imaging positive for cholelithiasis with cholecystitis, he has been on IV antibiotics. Had laparoscopic cholecystectomy 10/1.    Pt had ICD placed on 10/3 without complications. Was transferred out of CVU      Interval History:    Pt seen at bedside for follow up. Clinical notes and telemetry reviewed.  Pt had 1 episode of VT evening  10/07/24  0500    143 143   K 3.6 4.1 3.5    109 108   MG  --   --  2.00   CO2 25 25 24   BUN 19 20 20   CREATININE 1.2 1.2 1.2     Recent Labs     10/05/24  1348 10/06/24  0549 10/07/24  0500   WBC 13.7* 11.5* 14.9*   HGB 10.2* 9.4* 10.1*   HCT 30.4* 28.6* 29.9*   MCV 93.6 94.4 93.6    290 333     Lab Results   Component Value Date/Time    CKTOTAL 91 09/26/2024 12:42 PM    CKMB 1.47 03/13/2010 07:43 AM    TROPHS 29 09/25/2024 07:43 AM     Lab Results   Component Value Date/Time    PROBNP 3,322 09/29/2024 05:58 PM    PROBNP 3,901 09/24/2024 08:00 PM     Lab Results   Component Value Date/Time    PROTIME 10.5 01/27/2017 09:23 AM    PROTIME 10.8 03/13/2010 07:43 AM    INR 1.1 09/20/2024 07:44 AM    INR 1.2 08/29/2024 08:29 AM    INR 1.0 01/27/2017 09:23 AM     Lab Results   Component Value Date/Time    CHOL 143 09/20/2024 07:44 AM    HDL 39 09/20/2024 07:44 AM    HDL 57 06/02/2012 08:00 AM    TRIG 100 09/20/2024 07:44 AM         ECG 10/6 22:17      LHC 9/24/2024  LM  20% ostial, no dampening, plenty contrast reflux into aorta  LAD  40% mid  Cx  Normal  RCA  Normal  Dominance RCA Dominant  LVEDP 15mmHg Normal 3-12mmHg  LVG EF N/A d/t renal function Normal >/= 55%  LVG WM N/A d/t renal function  AVG  Normal        Echo 9/25/2024    Left Ventricle: Moderately reduced left ventricular systolic function with a visually estimated EF of 30 - 35%. EF by 2D Simpsons Biplane is 35%. Left ventricle is mildly dilated. Normal wall thickness. Septal motion is consistent with post-operative status. Moderate global hypokinesis present. Indeterminate diastolic function.    Right Ventricle: Right ventricle is mildly dilated. Moderately reduced systolic function. TAPSE is abnormal. TDI systolic excursion is abnormal.    Left Atrium: Left atrium is severely dilated. Left atrial volume index is severely increased (>48 mL/m2).    Right Atrium: Right atrium is moderately dilated.    Aortic Valve: Bioprosthetic valve with

## 2024-10-07 NOTE — PROGRESS NOTES
[facility] - Inpatient Rehabilitation Department   Phone: [phone]    Physical Therapy    [] Initial Evaluation            [x] Daily Treatment Note         [] Discharge Summary      Patient: Jonathan Dove   : 1940   MRN: [id number]   Date of Service:  10/7/2024  Admitting Diagnosis: V tach (HCC)    Current Admission Summary: 83 yo male brought to [facility] by EMS on . Pt initially called EMS for lift assist, they encouraged the patient to come to the ED. Enroute pt found to be in V-tach, given meds and cardioverted enroute and again in ED. On arrival intubated for airway protection. LHC done  also, no significant findings. Extubated next day ().     10/1/2024:  lap choly  10/4/2024:  defibrillator/pacemaker implantation    Past Medical History:  has a past medical history of Acute, but ill-defined, cerebrovascular disease, Cancer (HCC), Degeneration of cervical intervertebral disc, Degeneration of lumbar or lumbosacral intervertebral disc, Enlarged prostate, Irritable bowel syndrome, Proteinuria, Pure hypercholesterolemia, and Unspecified cerebral artery occlusion with cerebral infarction.    Past Surgical History:  has a past surgical history that includes back surgery; Cardiac catheterization; Aortic mitral valve replacement; Cardiac procedure (N/A, 2024); Cholecystectomy, laparoscopic (N/A, 10/1/2024); and ep device procedure (N/A, 10/3/2024).    Discharge Recommendations: Jonathan Dove scored a 16/ on the AM-PAC short mobility form. Current research shows that an AM-PAC score of 17 or less is typically not associated with a discharge to the patient's home setting. Based on the patient's AM-PAC score and their current functional mobility deficits, it is recommended that the patient have 5-7 sessions per week of Physical Therapy at d/c to increase the patient's independence.  At this time, this patient demonstrates complex nursing, medical, and rehabilitative needs, and  assistance   Patient will complete transfers at minimal assistance   Patient will ambulate 50 ft with use of rolling walker at minimal assistance    Above goals reviewed on 10/7/2024.  All goals are ongoing at this time unless indicated above.      Therapy Session Time      Individual Group Co-treatment   Time In     1039   Time Out     1122   Minutes     43     Timed Code Treatment Minutes:  43 Minutes  Total Treatment Minutes:  43       Electronically Signed By: Neymar Walker PT, DPT, ATC-R [id number]

## 2024-10-07 NOTE — PROGRESS NOTES
2247 Pt arrived from 5T to CVICU rm 2915  A&O x 4, TAYLOR, f/c x 4   3 L NC   [place] to room & unit   4 eyes 4 hours complete   Began Q2 turning schedule   SCDs on   CBI remains low moderate flow rate     MD bedside discussing course of treatment   Hold amio bolus and amio gtt as pt is in SR 86   MD would like to start amio gtt if HR >110 sustaining or w/any abnormal ventricular arrhthymias occur   NPO overnight

## 2024-10-07 NOTE — PROGRESS NOTES
Speech Language Pathology  [facility] - Inpatient Rehabilitation Services  [phone]  SLP Clinical Swallow Evaluation       Patient: Jonathan Dove   : 1940   MRN: [id number]      Evaluation Date: 10/7/2024      Admitting Dx: Syncope and collapse [R55]  Hypokalemia [E87.6]  Ventricular tachycardia (HCC) [I47.20]  Septicemia (HCC) [A41.9]  STEMI (ST elevation myocardial infarction) (HCC) [I21.3]  Acute encephalopathy [G93.40]  V tach ([facility]) [I47.20]  Treatment Diagnosis: Oropharyngeal Dysphagia   Pain: Denies                                  Recommendations      Recommended Diet and Intervention 10/7/2024:  Diet Solids Recommendation:  Regular texture diet  Liquid Consistency Recommendation:  Thin liquids  Recommended form of Meds: Meds whole with water         Compensatory strategies: Alternate solids/liquids , Small bites/sips , Eat/feed slowly    Discharge Recommendations:  No further follow-up appears indicated at this time.     History/Course of Treatment     H&P:  STEVO Dove is a 84 y.o. male with pmh of CHF, nonischemic cardiomyopathy, status post mitral valve replacement with bioprosthetic valve, status post aortic valve replacement with bioprosthetic valve, CAD status post CABG who presents with V. tach.   Patient was intubated in the ED for airway protection.  History was taken from patient's chart.  Per chart, patient presented to ED after EMS was alerted to his house for the lift assist.  When they found him and they wanted to come Main for evaluation.  He insisted that he did not need to come to the hospital.  When they were able to convince him to come to the hospital, EKG was done and showed patient was in V. tach.  His systolic blood pressure was in the low 80s.  EMS gave him 5 mg of Versed and cardioverted en route to the hospital.  In the ED, his EKG showed a new right bundle branch block.  When he arrived in the ED, his GCS was 3 and patient was intubated for  ambulation. Therefore, aspiration precautions should be in place. SLP provided education re: safe swallowing strategies and rationale for speech therapy evaluation/intervention. Recommend  regular texture with thin liquids  and ongoing intervention for diet tolerance.    Patient Positioning: Upright in bed     Diet level prior to evaluation: Regular texture diet  Thin liquids      Respiratory Status:   Room air     Dentition:  Adequate dentition     Baseline Vocal Quality:  WNL     Volitional Cough:  Elicited: Strong     Volitional Swallow:   []Absent   []Delayed     []Adequate     []Required use of drink     Oral Mechanism Exam:  Within functional limits   Impaired:        Oral Phase: Within functional limits        Pharyngeal Phase: Within functional limits   Other:     Dysphagia risk factors:   hx of CVA, hx of CHF, and co-morbidities  Risk factors for developing adverse outcomes to aspiration:   Impaired health status  Eating Assistance:   Independent              Goals     Goals:  Dysphagia Goals: Pt will functionally tolerate recommended diet with no overt clinical s/s of aspiration   If s/s of pharyngeal phase dysphagia continue to be noted pt will participate in Modified Barium Swallow Study (MBS)  to further assess pharyngeal phase of swallow, assist with diet recommendations and to further direct plan of care         POC/Education     Dysphagia Therapeutic Intervention:  Diet Tolerance Monitoring , Patient/Family Education , Therapeutic Trials with SLP     Plan of care: 1-2 times to ensure diet tolerance.    Education:  Provided education regarding role of SLP, results of assessment, recommendations and general speech pathology plan of care:  Pt verbalized understanding and agreement     If patient discharges prior to next visit, this note will serve as discharge.     Treatment time:  Timed Code Treatment Minutes: 0   Total Treatment Time Minutes: 25     Electronically signed by:    LARRY Grimes,

## 2024-10-07 NOTE — PROGRESS NOTES
Rapid Response Quick Summary    Room: CVU-2915/2915-01    Assessment of concern / patient:  tachycardic 180's, BP stable, denies chest pain, shortness of breath or dizziness.    Physician involved:  Dr. Gaffney    Interventions:  12-lead EKG showed wide complex tachycardia, metoprolol 5 mg IV, no change in HR but SBP down to 70's. Versed 2 mg IV for sedation for cardioversion. Cardioverted at 200 joules, synch successfully. Amio bolus and gtt ordered.    Disposition:  Transferred to CVICU 2915 for continued care.

## 2024-10-07 NOTE — PROGRESS NOTES
4 Eyes Skin Assessment     NAME:  Jonathan Dove  YOB: 1940  MEDICAL RECORD NUMBER:  [id number]    The patient is being assessed for  Transfer to New Unit    I agree that at least one RN has performed a thorough Head to Toe Skin Assessment on the patient. ALL assessment sites listed below have been assessed.      Areas assessed by both nurses:    Head, Face, Ears, Shoulders, Back, Chest, Arms, Elbows, Hands, Sacrum. Buttock, Coccyx, Ischium, Legs. Feet and Heels, and Under Medical Devices     Scattered abrasions BLE         Does the Patient have a Wound? No noted wound(s)    Scab RLE        Edgar Prevention initiated by RN: Yes  Wound Care Orders initiated by RN: No    Pressure Injury (Stage 3,4, Unstageable, DTI, NWPT, and Complex wounds) if present, place Wound referral order by RN under : No    New Ostomies, if present place, Ostomy referral order under : No     Nurse 1 eSignature: Electronically signed by MARTHA HAWKINS RN on 10/6/24 at 11:42 PM EDT    **SHARE this note so that the co-signing nurse can place an eSignature**    Nurse 2 eSignature: Electronically signed by Adelia Lux RN on 10/6/24 at 11:43 PM EDT

## 2024-10-07 NOTE — CONSULTS
Interventional Radiology  Consult Note    CHIEF COMPLAINT: Refrectory hematuria  Reason for Consult: Hematuria  Requesting Physician: Yecenia    History Obtained From: patient, family member - daughter    HISTORY OF PRESENT ILLNESS:         The patient is a 84 y.o. male with significant past medical history of atrial fibrillation/flutter, PVCs, HLD, CVA, mild to moderate CAD who was originally brought to hospital 9/24 for v tach requiring cardioversion. Course complicated by acute di s/p cholecystectomy on 10/1. He had ICD placement 10/3. On 10/6 he had VT with hypotension requiring cardioversion, now in sinus rhythm.    Pertinent to consult, patient has had gross hematuria ongoing for 11 days despite CBI and holding anticoagulants. IR consulted for prostate artery embolization.      Past Medical History:        Diagnosis Date    Acute, but ill-defined, cerebrovascular disease     Cancer (HCC)     skin cancer of scalp - basal    Degeneration of cervical intervertebral disc     Degeneration of lumbar or lumbosacral intervertebral disc     Enlarged prostate     Irritable bowel syndrome     Proteinuria     Pure hypercholesterolemia     Unspecified cerebral artery occlusion with cerebral infarction 2011    bleed        Past Surgical History:          Procedure Laterality Date    AORTIC MITRAL VALVE REPLACEMENT      BACK SURGERY      CARDIAC CATHETERIZATION      attempted stent, but wouldn't hold    CARDIAC PROCEDURE N/A 9/24/2024    Left heart cath / coronary angiography performed by Jigar Martin MD at [facility] CARDIAC CATH LAB    CHOLECYSTECTOMY, LAPAROSCOPIC N/A 10/1/2024    ROBOTIC LAPAROSCOPIC CHOLECYSTECTOMY WITH  CHOLANGIOGRAM performed by Valentino Cortez MD at [facility] OR    EP DEVICE PROCEDURE N/A 10/3/2024    Insert ICD dual performed by Juan Song MD at [facility] CARDIAC CATH LAB       Current Medications:    Current Facility-Administered Medications: [START ON 10/8/2024] metoprolol succinate (TOPROL XL)

## 2024-10-07 NOTE — PROGRESS NOTES
Daughter of pt called after receiving a message from the hospitalist, Dr. Gaffney.  This RN updated the daughter and answered all questions.

## 2024-10-07 NOTE — PROGRESS NOTES
Urology Progress Note  [facility]    Provider: NAVIN Stephenson CNP  Patient ID:  Admission Date: 2024 Name: Jonathan Dove  OR Date: 2024 MRN: [id number]   Patient Location: [facility]2915/2915-01 : 1940  Attending: Marcus Childers MD Date of Service: 10/7/2024  PCP: Raphael Huntley Jr., MD     Diagnoses:  Gross hematuria  Urinary retention in the setting of critical illness  BPH  Bladder spasms with pericatheter urethral incontinence    Assessment/Plan:  Gross hematuria -this is mostly catheter related almost certainly and due to his large prostate. Hold Eliquis as able. We have been holding eliquis and patient has had hematuria ongoing x11 days.  CBI was clamped this morning, after two hours of being clamped the urine had become cherry red. Discussed prostatic aortic embolization with patient, family and internal med. This was also discussed with Dr. Krishnamurthy last week, if urine was not to clear. Will discuss with IR and consider PAE as an inpatient.     Anticipate discharge when the above goals have been met    The patient had a chance to ask questions which were answered. He understands the above plan.    Subjective:   No event  No complaints  AFVSS      Objective:   Vitals:  Vitals:    10/07/24 0737   BP: 119/87   Pulse: 81   Resp: 22   Temp: 97.6 °F (36.4 °C)   SpO2: 94%       Intake/Output Summary (Last 24 hours) at 10/7/2024 1023  Last data filed at 10/7/2024 0802  Gross per 24 hour   Intake 240 ml   Output 5925 ml   Net -5685 ml       Physical Exam:  White male in no acute distress  Pleasant and conversational  Nontoxic  Abdomen benign  Penis uncircumcised  Three-way Castañeda catheter in place with some dark red urine but the CBI is running slowly.  I turned it up and it runs fine.    Labs:  Lab Results   Component Value Date    WBC 14.9 (H) 10/07/2024    HGB 10.1 (L) 10/07/2024    HCT 29.9 (L) 10/07/2024    MCV 93.6 10/07/2024     10/07/2024     Lab Results

## 2024-10-07 NOTE — PROGRESS NOTES
Hospitalist Progress Note    Name:  Jonathan Dove    /Age/Sex: 1940  (84 y.o. male)  MRN & CSN:  [id number] & [id number]    PCP: Raphael Huntley Jr., MD    Date of Admission: 2024    Patient Status:  Inpatient     Chief Complaint:   Chief Complaint   Patient presents with    Irregular Heart Beat     Pt from home via [facility] for unstable V-tach, per EMS pt was in vtach but was alert and oriented with SBP in 80s. EMS gave 5 mg versed en route and cardioverted to NSR with PVCs, pt being bagged upon arrival, pulse still present       Hospital Course:   Patient admitted for V tach. Was shocked 1x on admission. Was intubated for cardioversion, now extubated.  Sent for cath nonobstructive, has low EF 30 to 35%.  Also found to have acute cholecystitis but improving with conservative management, patient high risk for surgery and now plan for that.  Will need ICD, but currently being deferred until EP can assure low risk from infectious standpoint.    Has been confused/delirious and was started on Precedex drip.    Developed VT on 10/6 and required DCCV.    IR consulted for prostate embolectomy for gross hematuria.    Subjective:  Today is:  Hospital Day: 14.  Patient seen and examined in CVU-/2915.     Overnight events noted.  Currently in NSR.  Has ongoing hematuria.  No CP, SOB, HA or fevers.  Coughing.    Medications:  Reviewed      Intake/Output Summary (Last 24 hours) at 10/7/2024 1859  Last data filed at 10/7/2024 1842  Gross per 24 hour   Intake --   Output 7675 ml   Net -7675 ml       Physical Exam Performed:    BP (!) 113/56   Pulse 70   Temp 98.4 °F (36.9 °C) (Temporal)   Resp 20   Ht 1.727 m (5' 7.99\")   Wt 73.5 kg (162 lb 0.6 oz)   SpO2 94%   BMI 24.64 kg/m²     General appearance: Alert and oriented.  No acute distress.  HEENT: Pupils equal, round, and reactive to light. Conjunctivae/corneas clear. NC present.  Neck: Supple, with full range of motion. No jugular  airspace disease   identified.         CT HEAD WO CONTRAST   Final Result   No acute intracranial abnormality.      Mild generalized cerebral volume loss and moderate chronic microvascular   ischemic changes of the periventricular and subcortical white matter.         XR CHEST PORTABLE   Final Result   1. Endotracheal tube is noted with its tip approximately 3.8 cm from the   yunior.   2. Small left pleural effusion and/or basilar atelectasis.         CT TRANSCATHETER THERAPY EMBOLIZATION    (Results Pending)           Assessment/Plan:    Active Hospital Problems    Diagnosis     Acute cholecystitis [K81.0]     Gross hematuria [R31.0]     Acute cholecystitis due to biliary calculus [K80.00]     On mechanically assisted ventilation (HCC) [Z99.11]     Chronic atrial flutter (HCC) [I48.92]     Acute on chronic systolic heart failure (HCC) [I50.23]     Acute respiratory failure with hypoxia [J96.01]     Somnolence [R40.0]     V tach (HCC) [I47.20]     Ventricular tachycardia (HCC) [I47.20]     CKD (chronic kidney disease), stage III (HCC) [N18.30]     Essential hypertension [I10]     CAD (coronary artery disease) [I25.10]     Mixed hyperlipidemia [E78.2]          Hospital Day: 14    This is a 84 y.o. male who presented to [facility] on 9/24/2024 and is being treated for:    Vtach/NICM  -Daily labs; replace electrolytes as needed. Keep k >4, mag >2  Continue amiodarone  -Continue Entresto  -Cardiology consulted  S/p AICD placement on 10/4  Recurrent  VT s/p DCCV on  10/6, s/p Amio gtt  EP following    Acute cholecystitis  -US show acute cholecysitis and cholelithiasis  -s/p lap di on 10/1,      Acute confusion/delirium  -Likely 2/2 infection versus ICU delirium  Currently patient awake alert oriented    Hematuria,gross  -Likely traumatic sweeney  -Continues to have bloody urine  -Continue bladder irrigation  -AC on hold   -Urology following  -IR to perform CT Transcather embolectomy on 10/9    New onset acute

## 2024-10-08 NOTE — PROGRESS NOTES
Nutrition Note    RECOMMENDATIONS  PO Diet: Continue current diet  ONS: Ordered Magic Cup and Gelatein each once daily     ASSESSMENT   Pt triggered for follow-up. On regular diet with plans to go NPO at midnight for embolectomy tomorrow. Minimal documented meal intakes of 1-25% or 51-75% since last RD assessment. Upon visiting, pt reported he has no appetite. Was receiving Ensure Plus HP TID but order canceled d/t prior NPO status and does not wish to receive again. Willing to try Magic Cup and Gelatein to offer additional nutrition. RD will continue to monitor for adequate po intake. Possible discharge to ARU.     Malnutrition Status: At risk for malnutrition (Comment)  Acute Illness    NUTRITION DIAGNOSIS   Inadequate oral intake related to inadequate protein-energy intake as evidenced by intake 51-75%, intake 26-50%, intake 0-25% (average intake throughout admission)    Goals: PO intake 50% or greater, by next RD assessment     NUTRITION RELATED FINDINGS  Objective: Labs reviewed. LBM 10/7. +3 BLE edema.  Wounds: Multiple, Surgical Incision    CURRENT NUTRITION THERAPIES  ADULT DIET; Regular  Diet NPO     PO Intake: 1-25%, 26-50%, 51-75%   PO Supplement Intake:None Ordered    ANTHROPOMETRICS  Current Height: 172.7 cm (5' 7.99\")  Current Weight - Scale: 72.3 kg (159 lb 6.3 oz)    Admission weight: 73.1 kg (161 lb 2.5 oz)  Ideal Body Weight (IBW): 154 lbs  (70 kg)      BMI: 24.2    COMPARATIVE STANDARDS  Total Energy Requirements (kcals/day): 0624-2897     Protein (g):         Fluid (mL/day):  9841-9553    EDUCATION  Education not indicated     The patient will be monitored per nutrition standards of care. Consult dietitian if additional nutrition interventions are needed prior to RD reassessment.     Kelly Diaz MS, RD, LD    Contact: [phone]

## 2024-10-08 NOTE — PROGRESS NOTES
Urology Progress Note  [facility]    Provider: NAVIN Stephenson CNP  Patient ID:  Admission Date: 2024 Name: Jonathan Dove  OR Date: 2024 MRN: [id number]   Patient Location: [facility]2915/2915-01 : 1940  Attending: Marcus Childers MD Date of Service: 10/8/2024  PCP: Raphael Huntley Jr., MD     Diagnoses:  Gross hematuria  Urinary retention in the setting of critical illness  BPH  Bladder spasms with pericatheter urethral incontinence    Assessment/Plan:  Gross hematuria -this is mostly catheter related almost certainly and due to his large prostate. Hold Eliquis as able. We have been holding eliquis and patient has had hematuria ongoing x12 days.  Hgb now drifting lower. 9 from 15 on admission. Discussed prostatic aortic embolization with patient, family, Dr. Forde with radiology and internal med. This was also discussed with Dr. Krishnamurthy and Dr. Yepez who agree to the plan of care. NPO midnight. He will need to keep his sweeney in place x1 week after PAE, it will need irrigated regularly and PRN to remove any necrotic tissue. Post operative pain may be moderate to severe and may need managed accordingly.     Anticipate discharge when the above goals have been met    The patient had a chance to ask questions which were answered. He understands the above plan.    Subjective:   No event  No complaints  AFVSS      Objective:   Vitals:  Vitals:    10/08/24 0733   BP: (!) 111/58   Pulse: 98   Resp: 18   Temp: 99.9 °F (37.7 °C)   SpO2: 92%       Intake/Output Summary (Last 24 hours) at 10/8/2024 1158  Last data filed at 10/8/2024 1020  Gross per 24 hour   Intake 120 ml   Output 6125 ml   Net -6005 ml       Physical Exam:  White male in no acute distress  Pleasant and conversational  Nontoxic  Abdomen benign  Penis uncircumcised  Three-way Sweeney catheter in place with some dark red urine but the CBI is running slowly.  I turned it up and it runs fine.    Labs:  Lab Results   Component

## 2024-10-08 NOTE — PROGRESS NOTES
[facility] - Inpatient Rehabilitation Department   Phone: [phone]    Occupational Therapy    [] Initial Evaluation            [x] Daily Treatment Note         [] Discharge Summary      Patient: Jonathan Dove   : 1940   MRN: [id number]   Date of Service:  10/8/2024    Admitting Diagnosis:  V tach (HCC)  Current Admission Summary:84 y.o. male with PMHx notable for HTN, HLD, Afib, nonischemic cardiomyopathy, CAD s/p CABG, s/p AVR and MVR, CVA  who presented on  with unstable ventricular tachycardia.  He was cardioverted by EMS.  He was intubated for airway protection.  He underwent cardiac catheterization which showed nonobstructive CAD, no intervention was performed.  Cardiology was consulted, recommended IV amiodarone which was eventually transition to oral.  AICD implantation was discussed, patient was agreeable however he developed acute cholecystitis and acute mental status changes which delayed this procedure.  He briefly required Precedex drip, but no longer needed.  General surgery was consulted, and patient is now s/p laparoscopic cholecystectomy on 10/1. ICD implantation on 10/3.  Hospital course complicated by: hematuria, HTN.         Past Medical History:  has a past medical history of Acute, but ill-defined, cerebrovascular disease, Cancer (HCC), Degeneration of cervical intervertebral disc, Degeneration of lumbar or lumbosacral intervertebral disc, Enlarged prostate, Irritable bowel syndrome, Proteinuria, Pure hypercholesterolemia, and Unspecified cerebral artery occlusion with cerebral infarction.  Past Surgical History:  has a past surgical history that includes back surgery; Cardiac catheterization; Aortic mitral valve replacement; Cardiac procedure (N/A, 2024); Cholecystectomy, laparoscopic (N/A, 10/1/2024); and ep device procedure (N/A, 10/3/2024).    Discharge Recommendations: Jonathan Dove scored a  on the AM-PAC ADL Inpatient form. Current research shows

## 2024-10-08 NOTE — PROGRESS NOTES
precautions were taken to ensure accuracy, errors may have occurred.  Please disregard any typographical errors.

## 2024-10-08 NOTE — ACP (ADVANCE CARE PLANNING)
Advanced Care Planning Note.    Purpose of Encounter: Advanced care planning in light of VT  Parties In Attendance: Patient, daughter  Decisional Capacity: Limited  Subjective: Patient is coughing  Objective: Cr 1.1  Goals of Care Determination: Patient/POA want  full support (CPR, vent, surgery, HD, trach, PEG)  Plan:  ICD, Lap di, DCCV, CBI, Cardio/Surgery/Urology/PMR consults, PT/OT, ARU, IR embolectomy  Code Status:Full code   Time spent on Advanced care Plannin minutes  Advanced Care Planning Documents: Completed advanced directives on chart, daughter is the POA.    Marcus Childers MD  10/8/2024 10:00 AM

## 2024-10-08 NOTE — PROGRESS NOTES
Spoke with Juliette BAHENA regarding PICC order. It is not recommended to place a PICC in a patient within 6 months of a pacemaker placement. This is to allow the wires to be fully embed into the muscle and to allow the fibrin sheath to fully mature in order to protect the wires from infection. When placing a PICC from either side the chance of bumping a wire is high - which potentially may dislodge or seed a wire with bacteria.  The patient has a working IV and does not need more access. The order will be completed.

## 2024-10-08 NOTE — PROGRESS NOTES
[facility] - Inpatient Rehabilitation Department   Phone: [phone]    Physical Therapy    [] Initial Evaluation            [x] Daily Treatment Note         [] Discharge Summary      Patient: Jonathan Dove   : 1940   MRN: [id number]   Date of Service:  10/8/2024  Admitting Diagnosis: V tach (HCC)    Current Admission Summary: 83 yo male brought to [facility] by EMS on . Pt initially called EMS for lift assist, they encouraged the patient to come to the ED. Enroute pt found to be in V-tach, given meds and cardioverted enroute and again in ED. On arrival intubated for airway protection. LHC done  also, no significant findings. Extubated next day ().     10/1/2024:  lap choly  10/4/2024:  defibrillator/pacemaker implantation    Past Medical History:  has a past medical history of Acute, but ill-defined, cerebrovascular disease, Cancer (HCC), Degeneration of cervical intervertebral disc, Degeneration of lumbar or lumbosacral intervertebral disc, Enlarged prostate, Irritable bowel syndrome, Proteinuria, Pure hypercholesterolemia, and Unspecified cerebral artery occlusion with cerebral infarction.    Past Surgical History:  has a past surgical history that includes back surgery; Cardiac catheterization; Aortic mitral valve replacement; Cardiac procedure (N/A, 2024); Cholecystectomy, laparoscopic (N/A, 10/1/2024); and ep device procedure (N/A, 10/3/2024).    Discharge Recommendations: Jonathan Dove scored a 16/ on the AM-PAC short mobility form. Current research shows that an AM-PAC score of 17 or less is typically not associated with a discharge to the patient's home setting. Based on the patient's AM-PAC score and their current functional mobility deficits, it is recommended that the patient have 5-7 sessions per week of Physical Therapy at d/c to increase the patient's independence.  At this time, this patient demonstrates complex nursing, medical, and rehabilitative needs, and  above.      Therapy Session Time      Individual Group Co-treatment   Time In     1040   Time Out     1120   Minutes     40     Timed Code Treatment Minutes:  40 Minutes  Total Treatment Minutes:  40       Electronically Signed By: Neymar Walker PT, DPT, ATC-R [id number]

## 2024-10-08 NOTE — PROGRESS NOTES
hypertension [I10] 06/30/2015    CAD (coronary artery disease) [I25.10] 02/04/2015    Mixed hyperlipidemia [E78.2]        Scheduled Meds:   metoprolol succinate  25 mg Oral Daily    amiodarone  400 mg Oral Daily    oxyBUTYnin  5 mg Oral TID    tamsulosin  0.4 mg Oral Nightly    sodium chloride flush  5-40 mL IntraVENous 2 times per day    sodium chloride flush  5-40 mL IntraVENous 2 times per day    finasteride  5 mg Oral Daily    sacubitril-valsartan  0.5 tablet Oral QHS    lactobacillus  1 capsule Oral BID WC    sodium chloride flush  5-40 mL IntraVENous 2 times per day    pantoprazole  40 mg IntraVENous Daily    aspirin  81 mg Oral Daily    [Held by provider] apixaban  5 mg Oral BID    sodium chloride flush  5-40 mL IntraVENous 2 times per day     Continuous Infusions:   sodium chloride      sodium chloride      sodium chloride      sodium chloride 800 mL/hr at 10/01/24 1432     PRN Meds:.sodium chloride flush, sodium chloride, sodium chloride flush, sodium chloride, acetaminophen **OR** acetaminophen, sodium chloride flush, sodium chloride, benzocaine-menthol, potassium chloride, sodium chloride flush, sodium chloride, potassium chloride **OR** potassium alternative oral replacement **OR** potassium chloride, magnesium sulfate, ondansetron **OR** ondansetron, polyethylene glycol   Allergies   Allergen Reactions    Antihistamine [Diphenhydramine Hcl]      Unable to urinate due to enlarged prostate    Antihistamines, Chlorpheniramine-Type     Aspirin Other (See Comments)     325mg only.  Hematuria.     Diphenhydramine        Dane Reyes PA-C  [facility]  [phone]    All questions and concerns were addressed to the patient/family. Alternatives to my treatment were discussed. I have discussed the above stated plan and the patient verbalized understanding and agreed with the plan.

## 2024-10-08 NOTE — PLAN OF CARE
Problem: Pain  Goal: Verbalizes/displays adequate comfort level or baseline comfort level  Outcome: Progressing     Problem: Chronic Conditions and Co-morbidities  Goal: Patient's chronic conditions and co-morbidity symptoms are monitored and maintained or improved  Outcome: Progressing     Problem: Safety - Adult  Goal: Free from fall injury  Outcome: Progressing     Problem: Genitourinary - Adult  Goal: Urinary catheter remains patent  Outcome: Progressing

## 2024-10-08 NOTE — PROGRESS NOTES
Hospitalist Progress Note    Name:  Jonathan Dove    /Age/Sex: 1940  (84 y.o. male)  MRN & CSN:  [id number] & [id number]    PCP: Raphael Huntley Jr., MD    Date of Admission: 2024    Patient Status:  Inpatient     Chief Complaint:   Chief Complaint   Patient presents with    Irregular Heart Beat     Pt from home via [facility] for unstable V-tach, per EMS pt was in vtach but was alert and oriented with SBP in 80s. EMS gave 5 mg versed en route and cardioverted to NSR with PVCs, pt being bagged upon arrival, pulse still present       Hospital Course:   Patient admitted for V tach. Was shocked 1x on admission. Was intubated for cardioversion, now extubated.  Sent for cath nonobstructive, has low EF 30 to 35%.  Also found to have acute cholecystitis but improving with conservative management, patient high risk for surgery and now plan for that.  Will need ICD, but currently being deferred until EP can assure low risk from infectious standpoint.    Has been confused/delirious and was started on Precedex drip.    Developed VT on 10/6 and required DCCV.    IR consulted for prostate embolectomy for gross hematuria on 10/9.    Subjective:  Today is:  Hospital Day: 15.  Patient seen and examined in CVU-/2915.     Remains on CBI, urine clearer today.  No CP, SOB, HA or fevers.  Still coughing.    Medications:  Reviewed      Intake/Output Summary (Last 24 hours) at 10/8/2024 0958  Last data filed at 10/8/2024 0900  Gross per 24 hour   Intake 120 ml   Output 57429 ml   Net -11398 ml       Physical Exam Performed:    BP (!) 111/58   Pulse 98   Temp 99.9 °F (37.7 °C) (Temporal)   Resp 18   Ht 1.727 m (5' 7.99\")   Wt 72.3 kg (159 lb 6.3 oz)   SpO2 92%   BMI 24.24 kg/m²     General appearance: Alert and oriented.  No acute distress.  HEENT: Pupils equal, round, and reactive to light. Conjunctivae/corneas clear. NC present.  Neck: Supple, with full range of motion. No jugular venous    identified.         CT HEAD WO CONTRAST   Final Result   No acute intracranial abnormality.      Mild generalized cerebral volume loss and moderate chronic microvascular   ischemic changes of the periventricular and subcortical white matter.         XR CHEST PORTABLE   Final Result   1. Endotracheal tube is noted with its tip approximately 3.8 cm from the   yunior.   2. Small left pleural effusion and/or basilar atelectasis.         CT TRANSCATHETER THERAPY EMBOLIZATION    (Results Pending)           Assessment/Plan:    Active Hospital Problems    Diagnosis     Acute cholecystitis [K81.0]     Gross hematuria [R31.0]     Acute cholecystitis due to biliary calculus [K80.00]     On mechanically assisted ventilation (HCC) [Z99.11]     Chronic atrial flutter (HCC) [I48.92]     Acute on chronic systolic heart failure (HCC) [I50.23]     Acute respiratory failure with hypoxia [J96.01]     Somnolence [R40.0]     V tach (HCC) [I47.20]     Ventricular tachycardia (HCC) [I47.20]     CKD (chronic kidney disease), stage III (HCC) [N18.30]     Essential hypertension [I10]     CAD (coronary artery disease) [I25.10]     Mixed hyperlipidemia [E78.2]          Hospital Day: 15    This is a 84 y.o. male who presented to [facility] on 9/24/2024 and is being treated for:    Vtach/NICM  -Daily labs; replace electrolytes as needed. Keep k >4, mag >2  Continue amiodarone and Toprol XL  -Continue Entresto  -Cardiology consulted  S/p AICD placement on 10/4  Recurrent  VT s/p DCCV on 10/6, s/p Amio gtt  EP following    Acute cholecystitis  -US show acute cholecysitis and cholelithiasis  -s/p lap di on 10/1,      Acute confusion/delirium  -Likely 2/2 infection versus ICU delirium  Currently patient awake alert oriented    Hematuria,gross  -Likely traumatic sweeney  -Continues to have bloody urine  -Continue bladder irrigation  -Eliquis on hold   -Urology following  -IR to perform CT Transcather embolectomy on 10/9    New onset acute

## 2024-10-09 PROBLEM — Z95.2 HISTORY OF PROSTHETIC AORTIC VALVE: Status: ACTIVE | Noted: 2024-10-09

## 2024-10-09 PROBLEM — E87.6 HYPOKALEMIA: Status: ACTIVE | Noted: 2024-10-09

## 2024-10-09 PROBLEM — G93.40 ACUTE ENCEPHALOPATHY: Status: ACTIVE | Noted: 2024-01-01

## 2024-10-09 PROBLEM — I50.20 HFREF (HEART FAILURE WITH REDUCED EJECTION FRACTION) (HCC): Status: ACTIVE | Noted: 2024-01-01

## 2024-10-09 PROBLEM — R55 SYNCOPE AND COLLAPSE: Status: ACTIVE | Noted: 2024-01-01

## 2024-10-09 PROBLEM — I21.3 ST ELEVATION MYOCARDIAL INFARCTION (STEMI) (HCC): Status: ACTIVE | Noted: 2024-10-09

## 2024-10-09 PROBLEM — A41.9 SEPTICEMIA (HCC): Status: ACTIVE | Noted: 2024-10-09

## 2024-10-09 PROBLEM — Z95.2 H/O PROSTHETIC MITRAL VALVE: Status: ACTIVE | Noted: 2024-01-01

## 2024-10-09 NOTE — PROGRESS NOTES
Hospitalist Progress Note    Name:  Jonathan Dove    /Age/Sex: 1940  (84 y.o. male)  MRN & CSN:  [id number] & [id number]    PCP: Raphael Huntley Jr., MD    Date of Admission: 2024    Patient Status:  Inpatient     Chief Complaint:   Chief Complaint   Patient presents with    Irregular Heart Beat     Pt from home via [facility] for unstable V-tach, per EMS pt was in vtach but was alert and oriented with SBP in 80s. EMS gave 5 mg versed en route and cardioverted to NSR with PVCs, pt being bagged upon arrival, pulse still present       Hospital Course:   Patient admitted for V tach. Was shocked 1x on admission. Was intubated for cardioversion, now extubated.  Sent for cath nonobstructive, has low EF 30 to 35%.  Also found to have acute cholecystitis but improving with conservative management, patient high risk for surgery and now plan for that.  Will need ICD, but currently being deferred until EP can assure low risk from infectious standpoint.    Has been confused/delirious and was started on Precedex drip.    Developed VT on 10/6 and required DCCV.    IR consulted for prostate embolectomy for gross hematuria.    On 10/9, developed VT.  ICD fired.  Patient started on Lidocaine gtt.    Spiked fever on 10/9.  Blood cx requested.  CXR requested.  Started on IV Zosyn for possible aspiration during ICD discharge.        Subjective:  Today is:  Hospital Day: 16.  Patient seen and examined in CVU-/2915.     Patient went into VT this morning.  His ICD discharged when HR 190s.  Patient with fever this morning.  Coughing.  No CP, HA or abdominal pain.  Not SOB    Medications:  Reviewed      Intake/Output Summary (Last 24 hours) at 10/9/2024 0519  Last data filed at 10/9/2024 0200  Gross per 24 hour   Intake --   Output 2850 ml   Net -2850 ml       Physical Exam Performed:    /63   Pulse (!) 101   Temp 99 °F (37.2 °C) (Temporal)   Resp 20   Ht 1.727 m (5' 7.99\")   Wt 72.3 kg (159 lb  IV Zosyn for suspected aspiration  -ID consult to evaluate    Acute cholecystitis  -US show acute cholecysitis and cholelithiasis  -s/p lap di on 10/1,      Acute confusion/delirium  -Likely 2/2 infection versus ICU delirium  Currently patient awake alert oriented    Hematuria,gross  -Likely traumatic sweeney  -Continues to have bloody urine  -Continue bladder irrigation  -Eliquis on hold   -Urology following  -IR to perform CT Transcather embolectomy on 10/9    New onset acute systolic heart failure  -Probably 2/2 above  -Echo 9/25/24 showed LVEF 30-35%  -[facility] wnl  -I/Os    CKD III  -Creatinine 1.6 on admission; baseline approximately 1.3-1.7  -Avoid nephrotoxins  -Follow BMP    Chronic atrial flutter  -Holding eliquis for hematuria    On mechanical ventilation - resolved  -Extubated on 9/25  Off oxygen  Hypertension  -Continue home antihypertensives    Hyperlipidemia  -Continue home statin          Drugs that require monitoring for toxicity include: Eliquis and the method of monitoring was/is CBC    DVT ppx: SCD  GI ppx: PPI  Diet: Diet NPO  Code Status: Full Code    Discussed with patient, nursing and CM.    Discussed with Dr Castle (IR).  Possible embolectomy on 10/11/24 if VT issues controlled.    Discussed with Joe Mandujano (EP NP).  Dr Song to review VT today.    Discussed with Joe Michel (Urology PA).  He manually irrigated small clots from CBI.    Discussed above in detail with daughter at bedside.    Critical care time with patient was 31 minutes excluding separately billable procedures.    Marcus Childers MD  10/9/2024  5:19 AM

## 2024-10-09 NOTE — PROGRESS NOTES
Physical Occupational Therapy  Patient had runs of tachycardia up to 250 bpm.  Hold per RN.    Neymar Walker PT, DPT, ATC-R [id number]  Lizeth Hartman, OTR/L [id number]

## 2024-10-09 NOTE — FLOWSHEET NOTE
10/09/24 0522   Vitals   Pulse (!) 257     Pt in [facility],  at bedside. Amio PO administered early, no amio protocol r/t no PICC or central access. Pt ICD fired, converted back into NSR. Pt asymptomatic.

## 2024-10-09 NOTE — PROGRESS NOTES
Urology Progress Note  [facility]     Patient: Jonathan Dove MRN: [id number]  Room/Bed: [facility]-2915/2915-01   YOB: 1940  Age/Sex: 84 y.o.male  Admission Date: 9/24/2024     Date of Service:  10/9/2024    ASSESSMENT/PLAN     1. Ventricular tachycardia (HCC)    2. STEMI (ST elevation myocardial infarction) ([facility])    3. Syncope and collapse    4. Acute encephalopathy    5. Septicemia (HCC)    6. Hypokalemia    7. V tach ([facility])    8. Acute cholecystitis    9. HFrEF (heart failure with reduced ejection fraction) ([facility])      Gross hematuria -this is mostly catheter related almost certainly and due to his large prostate. Hold Eliquis as able. We have been holding eliquis and patient has had hematuria ongoing x12 days. Hgb now 10.2. PAE was planned today, however earlier this morning the patient was in Vtach so procedure cancelled. His sweeney is draining tea colored urine and he was noted to have some leakage overnight - he is on oxybutynin. On exam today his sweeney is draining well and urine is tea colored. CBI slowed. I do not see any clots on exam, and no significant leakage on exam.   He is afebrile and cr is wnl. No pain on exam    Reccs  Continue sweeney - continue CBI. Monitor for leakage and continue oxybutynin. No significant leakage on exam today and no significant clots on exam. I did irrigate the sweeney at bedside today and I did not see any large clot burden, very smaller sized clots were irrigated. Irrigate prn  Will need rescheduled for PAE. Reviewed with Dr. Childers and Dr. Castle - will plan to get this done this upcoming Friday as long as pt remains clinically appropriate     All patient questions were answered. He understands the plan as listed above.    SUBJECTIVE     Chief Complaint:   Chief Complaint   Patient presents with    Irregular Heart Beat     [facility] V-[facility]ach, per EMS pt was in vtach but was alert and oriented with SBP in 80s. EMS gave 5 mg  conscious sedation for comfort.  Independent trained observer pushed medications at my direction. Level of consciousness and vital signs/physiologic status monitored throughout the procedure (see start and stop times above, as well as medication dosages). Specimens None Diagnostic Detail RADIAL ACCESS - Patient to cath lab in postabsorptive state, informed consent obtained. Radial site prepped and draped in normal sterile fashion. Needle used to access artery with US guidance and wire advanced into artery.  Sheath advanced over wire into artery.  JL advanced over wire to engage LMCA and JR advanced over wire to engage RCA and image in multiple views. JR/Pigtail advancd over wire into LV and LVEDP obtained, pullback gradient obtained.  Arterial hemostasis obtained with radial band.   CORONARY FINDINGS Artery Findings LM 20% ostial, no dampening, plenty contrast reflux into aorta LAD 40% mid Cx Normal RCA Normal Dominance RCA Dominant LVEDP 15mmHg Normal 3-12mmHg LVG EF N/A d/t renal function Normal >/= 55% LVG WM N/A d/t renal function AVG Normal INTERVENTION(S) None POST CATH  RECOMMENDATIONS Continue medical therapy for NICM/CHF Continue IV amio for VT Rohit as needed Echo in AM CHF consult in AM - med adjustments EP consult in AM - BIVAICD discussion - has historically not been inclined Discussed details of clinical condition and LHC with family     CT HEAD WO CONTRAST    Result Date: 9/24/2024  EXAMINATION: CT OF THE HEAD WITHOUT CONTRAST  9/24/2024 7:45 pm TECHNIQUE: CT of the head was performed without the administration of intravenous contrast. Automated exposure control, iterative reconstruction, and/or weight based adjustment of the mA/kV was utilized to reduce the radiation dose to as low as reasonably achievable. COMPARISON: None. HISTORY: ORDERING SYSTEM PROVIDED HISTORY: syncope TECHNOLOGIST PROVIDED HISTORY: Reason for exam:->syncope Has a \"code stroke\" or \"stroke alert\" been called?->No Decision Support

## 2024-10-09 NOTE — PROGRESS NOTES
aortic valve [Z95.2] 10/09/2024    H/O prosthetic mitral valve [Z95.2] 10/09/2024    Acute encephalopathy [G93.40] 10/09/2024    HFrEF (heart failure with reduced ejection fraction) ([facility]) [I50.20] 10/09/2024    Hypokalemia [E87.6] 10/09/2024    Septicemia ([facility]) [A41.9] 10/09/2024    Syncope and collapse [R55] 10/09/2024    Acute cholecystitis [K81.0] 09/29/2024    Gross hematuria [R31.0] 09/26/2024    Acute cholecystitis due to biliary calculus [K80.00] 09/26/2024    On mechanically assisted ventilation ([facility]) [Z99.11] 09/25/2024    Chronic atrial flutter ([facility]) [I48.92] 09/25/2024    Acute on chronic systolic heart failure ([facility]) [I50.23] 09/25/2024    Acute respiratory failure with hypoxia [J96.01] 09/25/2024    Somnolence [R40.0] 09/25/2024    V tach ([facility]) [I47.20] 09/24/2024    CKD (chronic kidney disease), stage III ([facility]) [N18.30] 01/04/2019    Essential hypertension [I10] 06/30/2015    CAD (coronary artery disease) [I25.10] 02/04/2015    Mixed hyperlipidemia [E78.2]        Scheduled Meds:   piperacillin-tazobactam  3,375 mg IntraVENous Q8H    lidocaine (cardiac)  80 mg IntraVENous Once    metoprolol succinate  25 mg Oral Daily    amiodarone  400 mg Oral Daily    oxyBUTYnin  5 mg Oral TID    tamsulosin  0.4 mg Oral Nightly    sodium chloride flush  5-40 mL IntraVENous 2 times per day    sodium chloride flush  5-40 mL IntraVENous 2 times per day    finasteride  5 mg Oral Daily    sacubitril-valsartan  0.5 tablet Oral QHS    lactobacillus  1 capsule Oral BID WC    sodium chloride flush  5-40 mL IntraVENous 2 times per day    pantoprazole  40 mg IntraVENous Daily    aspirin  81 mg Oral Daily    [Held by provider] apixaban  5 mg Oral BID    sodium chloride flush  5-40 mL IntraVENous 2 times per day     Continuous Infusions:   lidocaine 1 mg/min (10/09/24 1011)    sodium chloride      sodium chloride      sodium chloride      sodium chloride 800 mL/hr at 10/01/24 1432     PRN Meds:.sodium chloride flush, sodium  chloride, sodium chloride, acetaminophen **OR** acetaminophen, sodium chloride, benzocaine-menthol, potassium chloride, sodium chloride, potassium chloride **OR** potassium alternative oral replacement **OR** potassium chloride, magnesium sulfate, ondansetron **OR** ondansetron, polyethylene glycol   Allergies   Allergen Reactions    Antihistamine [Diphenhydramine Hcl]      Unable to urinate due to enlarged prostate    Antihistamines, Chlorpheniramine-Type     Aspirin Other (See Comments)     325mg only.  Hematuria.     Diphenhydramine        Dane Reyes PA-C  [facility]  [phone]    All questions and concerns were addressed to the patient/family. Alternatives to my treatment were discussed. I have discussed the above stated plan and the patient verbalized understanding and agreed with the plan.

## 2024-10-09 NOTE — CONSULTS
choledocholithiasis.   2. Suspected stricture of the distal common bile duct.         XR CHEST PORTABLE   Final Result   Pulmonary edema and small bilateral pleural effusions.         XR CHEST PORTABLE   Final Result   Persistent right basilar airspace opacification with small bilateral pleural   effusions.         US ABDOMEN LIMITED Specify organ? GALLBLADDER   Final Result   Cholelithiasis with acute cholecystitis.         CT UROGRAM   Final Result   1. Marked prostatomegaly with median lobe hypertrophy. Indeterminate soft   tissue density filling defects are seen within the bladder on either side of   the prostatic median lobe. Recommend correlation with cystoscopy.   2. Moderate gallbladder distension with associated wall thickening and   pericholecystic fluid. Cholelithiasis, including within the cystic duct.   Findings are suspicious for acute cholecystitis.   3. Consolidative opacities in the left lower lobe are suspicious for   pneumonia.   4. Small right pleural effusion and adjacent right lower lobe atelectasis.         XR CHEST PORTABLE   Final Result   Mild bibasilar opacities are favored to represent atelectasis, less likely   infection.         XR CHEST PORTABLE   Final Result   New right central line in place.  No pneumothorax or new airspace disease   identified.         CT HEAD WO CONTRAST   Final Result   No acute intracranial abnormality.      Mild generalized cerebral volume loss and moderate chronic microvascular   ischemic changes of the periventricular and subcortical white matter.         XR CHEST PORTABLE   Final Result   1. Endotracheal tube is noted with its tip approximately 3.8 cm from the   yunior.   2. Small left pleural effusion and/or basilar atelectasis.         CT TRANSCATHETER THERAPY EMBOLIZATION    (Results Pending)   XR CHEST PORTABLE    (Results Pending)       Outside records:    Labs, Microbiology, Radiology and pertinent results from Care everywhere, if available, were  may have occurred inadvertently. If you may need any clarification, please do not hesitate to contact me through EPIC or at the phone number provided below with my electronic signature.  Any pictures or media included in this note were obtained after taking informed verbal consent from the patient and with their approval to include those in the patient's medical record.        Noel Dimas MD, MPH, FACP, FIDSA  10/9/2024, 1:26 PM  Central Office Phone: [phone]  Central Office Fax: [phone]    [facility]ious [facility]   [address], [address] ([address])  [place] [address]  [facility] days:  Tuesday & Thursday AM    [facility]ious Disease  [address] , [address] (Medical office Building)  [place], [place], [address]  [facility] days: Wednesday AM

## 2024-10-09 NOTE — PROGRESS NOTES
Pt leaking  around catheter large amount. Spoke with KANDI Vaz; he requested we clamp the CBI and monitor. CBI clamped at this time.

## 2024-10-09 NOTE — PROGRESS NOTES
Speech Language Pathology  HOLD    Jonathan NAZARIO Silvestremartinin  1940    Attempted to see pt for dysphagia therapy. Pt is being held NPO for procedure this date. Will hold treatment and attempt to follow-up as schedule allows.     Thank you,     Montrell Frazier M.A., CCC-SLP [id number]  Speech-Language Pathologist

## 2024-10-09 NOTE — PROGRESS NOTES
3way sweeney manually flushed, moiderate size blood clot noted, after flushing, sweeney draining pink urine. CBI restarted. Will cont to closely monitor urine output.

## 2024-10-09 NOTE — FLOWSHEET NOTE
10/08/24 2023   Vitals   Temp 99 °F (37.2 °C)   Temp Source Temporal   Pulse 86   Heart Rate Source Monitor   Respirations 20   BP (!) 102/92   MAP (Calculated) 95   MAP (mmHg) 97   BP Location Right Arm   BP Upper/Lower Upper   BP Method Automatic   Patient Position Semi fowlers   Cardiac Rhythm Sinus rhythm;1° AV Block   Pain Assessment   Pain Assessment None - Denies Pain   Pain Level 0   Oxygen Therapy   SpO2 93 %   Pulse Oximetry Type Continuous   Pulse Oximeter Device Mode Continuous   Pulse Oximeter Device Location Finger   O2 Device None (Room air)     Shift assessment complete- see complex assessment data in flowsheet. VSS. Pt alert and oriented. POC discussed with pt, no further questions or concerns at this time. Medications administered. Pt bathed, paul care complete. Bed in lowest position, bed alarm on, call light within reach.    Electronically signed by Brianna Rangel RN on 10/8/2024 at 9:24 PM

## 2024-10-09 NOTE — PLAN OF CARE
Problem: Pain  Goal: Verbalizes/displays adequate comfort level or baseline comfort level  Outcome: Progressing     Problem: Chronic Conditions and Co-morbidities  Goal: Patient's chronic conditions and co-morbidity symptoms are monitored and maintained or improved  Outcome: Progressing     Problem: Safety - Adult  Goal: Free from fall injury  Outcome: Progressing     Problem: ABCDS Injury Assessment  Goal: Absence of physical injury  Outcome: Progressing

## 2024-10-09 NOTE — FLOWSHEET NOTE
10/09/24 0458   Vitals   Pulse (!) 101   /63   MAP (Calculated) 86   MAP (mmHg) 82   Oxygen Therapy   SpO2 93 %   Pulse Oximetry Type Intermittent   O2 Device None (Room air)     Pt with few runs of vtach overnight.   Electronically signed by Brianna Rangel RN on 10/9/2024 at 5:00 AM

## 2024-10-10 PROBLEM — R78.81 BACTEREMIA DUE TO KLEBSIELLA PNEUMONIAE: Status: ACTIVE | Noted: 2024-10-10

## 2024-10-10 PROBLEM — B96.1 BACTEREMIA DUE TO KLEBSIELLA PNEUMONIAE: Status: ACTIVE | Noted: 2024-10-10

## 2024-10-10 NOTE — PROGRESS NOTES
[facility] - Inpatient Rehabilitation Department   Phone: [phone]    Occupational Therapy    [] Initial Evaluation            [x] Daily Treatment Note         [] Discharge Summary      Patient: Jonathan Dove   : 1940   MRN: [id number]   Date of Service:  10/10/2024    Admitting Diagnosis:  V tach (HCC)  Current Admission Summary: 85 yo male brought to [facility] by EMS on . Pt initially called EMS for lift assist, they encouraged the patient to come to the ED. Enroute pt found to be in V-tach, given meds and cardioverted enroute and again in ED. On arrival intubated for airway protection. LHC done  also, no significant findings. Extubated next day ().      10/1/2024:  lap choly  10/4/2024:  defibrillator/pacemaker implantation  10/9/2024:  holding bladder embolization d/t cardiac status      Past Medical History:  has a past medical history of Acute, but ill-defined, cerebrovascular disease, Cancer (HCC), Degeneration of cervical intervertebral disc, Degeneration of lumbar or lumbosacral intervertebral disc, Enlarged prostate, Irritable bowel syndrome, Proteinuria, Pure hypercholesterolemia, and Unspecified cerebral artery occlusion with cerebral infarction.  Past Surgical History:  has a past surgical history that includes back surgery; Cardiac catheterization; Aortic mitral valve replacement; Cardiac procedure (N/A, 2024); Cholecystectomy, laparoscopic (N/A, 10/1/2024); and ep device procedure (N/A, 10/3/2024).    Discharge Recommendations: Jonathan Dove scored a 16/ on the AM-PAC ADL Inpatient form. Current research shows that an AM-PAC score of 17 or less is typically not associated with a discharge to the patient's home setting. Based on the patient's AM-PAC score and their current ADL deficits, it is recommended that the patient have 5-7 sessions per week of Occupational Therapy at d/c to increase the patient's independence.  At this time, this patient demonstrates  commands with increased time  Attention Span: attends with cues to redirect, difficulty attending to directions  Memory: decreased recall of precautions, decreased recall of recent events, decreased short term memory, decreased long term memory  Safety Judgement: decreased awareness of need for assistance, decreased awareness of need for safety  Problem Solving: assistance required to generate solutions, assistance required to implement solutions, assistance required to identify errors made  Insights: decreased awareness of deficits  Initiation: requires cues for some  Sequencing: requires cues for some  Orientation:    alert and oriented x 4  Command Following:   WFL     Education  Barriers To Learning: hearing  Patient Education: patient educated on goals, OT role and benefits, plan of care, ADL adaptive strategies, transfer training, discharge recommendations  Learning Assessment:  patient verbalizes understanding, would benefit from continued reinforcement    Assessment  Activity Tolerance: Limited by weakness and confusion, significant balance/coordination deficits    Impairments Requiring Therapeutic Intervention: decreased functional mobility, decreased ADL status, decreased strength, decreased endurance, decreased balance, decreased IADL  Prognosis: good  Clinical Assessment: The patient is a 84 y.o. male who presents below their baseline level of function due to above deficits, associated with V tach (HCC). Typically, pt is IND. Currently, pt is requiring CGA for transfers and max x2 for transfers only. Limited by cognition, coordination, balance deficits. Continued OT indicated in order to promote return to PLOF    Safety Interventions: patient left in chair, chair alarm in place, call light within reach, gait belt, nurse notified, and family/caregiver present    Plan  Frequency: 3-5 x/per week  Current Treatment Recommendations: strengthening, balance training, functional mobility training, transfer

## 2024-10-10 NOTE — SIGNIFICANT EVENT
APRN notified by RN of patient with confusion, restlessness and agitation.  He is attempting to get out of bed despite education and unable to be redirected  -Head CT completed earlier today negative.  Felt to be symptoms from ICU delirium  -Patient is not a candidate for Seroquel or antipsychotics secondary to QTc prolongation with some intermittent ectopy  -Day team ordered high-dose melatonin however, patient pocketing oral medications and refusing to swallow them  -Will give one-time dose IV lorazepam for patient safety  -Frequent reorientation, avoid stimuli at night, keep vitals minimal at night, open blinds during the day, avoid lines and catheters if possible, bowel regimens, pain control    Milagros Soria, APRN - NP

## 2024-10-10 NOTE — PROGRESS NOTES
Nursing Shift Summary: [id number]    Shift Events  Hallucinations and confusion significant  Melatonin added for tonight   At bedside shift report, confusion severe. Oncoming RN contacting MD  CT head and abd completed  + Blood culture   CBI continues at slow rate    Family updated: Yes:  Family at bedside    Rhythm: Normal Sinus Rhythm     Most recent vitals:   Visit Vitals  /66   Pulse 87   Temp 99.1 °F (37.3 °C) (Temporal)   Resp 30   Ht 1.727 m (5' 7.99\")   Wt 71.9 kg (158 lb 8.2 oz)   SpO2 94%   BMI 24.11 kg/m²        Respiratory support needed (if any):  - RA    Admission weight Weight - Scale: 77.6 kg (171 lb) (09/24/24 2004)    Today's weight    Wt Readings from Last 1 Encounters:   10/10/24 71.9 kg (158 lb 8.2 oz)        I/O & Drains  I/O this shift:  In: 648 [P.O.:520; I.V.:45.2; IV Piggyback:82.8]  Out: 1275 [Urine:1275]  Sweeney need assessed each shift: YES -  - continue sweeney r/t - CBI  Last documented BM (in last 48 hrs):  Patient Vitals for the past 48 hrs:   Last BM (including prior to admit)   10/10/24 0759 10/07/24   10/10/24 0800 10/07/24                Restraints (in use currently or dc'd in last 12 hrs): No      Lines/Drains reviewed @ bedside.  Peripheral IV 10/04/24 Right Antecubital (Active)   Number of days: 6       Peripheral IV 10/09/24 Right Hand (Active)   Number of days: 1       Urinary Catheter 09/26/24 3 Way (Active)   Number of days: 14         Drip rates at handoff:     NONE    Any consults during the shift? No      Bedside Shift Handoff given to: Tatyana Dumont @ 7:25 PM  All continuous medications reviewed at bedside  Maricruz Johnson RN

## 2024-10-10 NOTE — PROGRESS NOTES
quality was clear.. Pt seemingly declined since initial assessment on 10/7. Pt demonstrates increased risk for aspiration due to cognitive state , co morbidities , and advanced age . Based on today's assessment recommend Dysphagia III Soft and bite sized  with Thin liquids , Meds whole with water with use of compensatory swallow strategies (see above). If s/s of aspiration/ penetration occur, recommend instrumental swallow study to assess swallow function.     Eating Assistance:   Supervision or touching assistance  SPEECH LANGUAGE COGNITIVE ASSESSMENT:     Speech Diagnosis:   Speech Language Deficits     Impressions: Patient was evaluated via portions of the quick aphasia battery (QAB). Pt demonstrated speech language deficits characterized by motor speech deficits  and decreased speech intelligibility. Expressive and receptive language appeared grossly intact for answer simple yes/no questions and following simple commands. Pt seemingly confused and disoriented to situation and place as pt stated he thought we were at his apartment. Pt frequently spoke to people that were not in the room. Pt's speech intelligibility has declined since he was last seen on 10/7. Pt appeared more intelligible with one word utterances compared to multi-word utterances. Pt may benefit from speaking at a slower rate however, carryover of strategies is poor.. Pt demonstrated cognitive linguistic deficits characterized by impaired orientation  as he was only oriented to self and month. Per pt/family report, current status reflects a decline in functional communication baseline.. Based on today's assessment recommend speech language therapy to address deficits and facilitate return to prior level of function with ongoing assessment of cognitive linguistic skills with further goals to be established .      COMPREHENSION  Auditory Comprehension: Mild   Impaired Complex questions  Impaired Two step commands  Impaired Multi-step  Minutes: 0   Total Treatment Time Minutes: 25    Electronically signed by:    LARRY Grimes,  Clinician  Speech Language Pathology    Speech Language Pathologist observed and directed patient's plan of care. Co-signed and supervised by:     Wilda Da Silva MA CCC-SLP #[id number]  Speech Language Pathologist

## 2024-10-10 NOTE — PROGRESS NOTES
worsening leukocytosis, hypotension-blood pressure slightly better  Bilateral lower lobe pneumonia, worse in the right lower lobe  Gram-negative bacteremia with Klebsiella-currently on meropenem  Bioprosthetic mitral and aortic valves-monitor closely  Right-sided pleural effusion  Colon and rectal thickening on the CT scan of abdominal pelvis from 9/3/2024, concerning for colitis/proctitis  Cholecystitis, status post laparoscopic cholecystectomy with intraoperative angiogram on 10/1/2024  Severe prostatomegaly, noted on CT scan with the mass effect on the base of the bladder  Recent history ventricular tachycardia  History of stroke  Irritable bowel syndrome      Discussion:      The patient is afebrile.  The patient is on IV linezolid and IV meropenem.    The patient has been tolerating the antibiotics okay.    His blood culture that I had ordered yesterday is growing Klebsiella pneumoniae.  Susceptibilities are awaited.  Nasal MRSA probe was negative.    The patient has a serum creatinine of 1.3 today.    White cell count is 15,400.  Hemoglobin is 9.3 and the platelet count is 300,000 today     I reviewed the images of his CT scan of the head done without contrast and chest abdomen pelvis done with IV contrast earlier today independently pretty CT findings.  Fluid collection noted in the gallbladder fossa, may be postoperative.  Bladder wall thickening noted.  Could not rule out cystitis.  Prostate is enlarged      Plan:     Diagnostic Workup:    Follow-up on susceptibility of Klebsiella isolated from the blood culture  Plan to repeat blood cultures tomorrow  Continue to follow  fever curve, WBC count and blood cultures.  Continue to monitor blood counts, liver and renal function.    Antimicrobials:    Continue IV meropenem 1 g every 8 hour for Klebsiella bacteremia  I will stop linezolid today  Recommend holding off on PICC line until the patient is afebrile for at least 24 hours and blood cultures have been  Somnolence R40.0    Ventricular tachycardia (HCC) I47.20    Gross hematuria R31.0    Acute cholecystitis due to biliary calculus K80.00    Acute cholecystitis K81.0    ICD (implantable cardioverter-defibrillator), dual, in situ Z95.810    ST elevation myocardial infarction (STEMI) ([facility]) I21.3    History of prosthetic aortic valve Z95.2    H/O prosthetic mitral valve Z95.2    Acute encephalopathy G93.40    HFrEF (heart failure with reduced ejection fraction) ([facility]) I50.20    Hypokalemia E87.6    Septicemia ([facility]) A41.9    Syncope and collapse R55       Please note that this chart was generated using Dragon dictation software. Although every effort was made to ensure the accuracy of this automated transcription, some errors in transcription may have occurred inadvertently. If you may need any clarification, please do not hesitate to contact me through EPIC or at the phone number provided below with my electronic signature.  Any pictures or media included in this note were obtained after taking informed verbal consent from the patient and with their approval to include those in the patient's medical record.        Noel Dimas MD, MPH, FACP, FIDSA  10/10/2024, 1:17 PM  Central Office Phone: [phone]  Central Office Fax: [phone]    [facility]   [address], [address] ([address])  [place] [address]  [facility] days:  Tuesday & Thursday AM    [facility]  [address] , [address] (Medical office Building)  [place], [place], [address]  [facility] days: Wednesday AM

## 2024-10-10 NOTE — PROGRESS NOTES
MD Rounding Communication    With Marcus Childers MD    Situation/ Action  Continued confusion  CT head order placed.   Code status changed/ family at bedside  Continue CBI  Lidocaine stopped  PO started      10:25 AM

## 2024-10-10 NOTE — PLAN OF CARE
Problem: Pain  Goal: Verbalizes/displays adequate comfort level or baseline comfort level  Outcome: Progressing     Problem: Chronic Conditions and Co-morbidities  Goal: Patient's chronic conditions and co-morbidity symptoms are monitored and maintained or improved  Outcome: Progressing  Flowsheets (Taken 10/10/2024 0800)  Care Plan - Patient's Chronic Conditions and Co-Morbidity Symptoms are Monitored and Maintained or Improved:   Monitor and assess patient's chronic conditions and comorbid symptoms for stability, deterioration, or improvement   Collaborate with multidisciplinary team to address chronic and comorbid conditions and prevent exacerbation or deterioration     Problem: Respiratory - Adult  Goal: Achieves optimal ventilation and oxygenation  Outcome: Progressing  Flowsheets (Taken 10/10/2024 0800)  Achieves optimal ventilation and oxygenation:   Assess for changes in respiratory status   Position to facilitate oxygenation and minimize respiratory effort   Oxygen supplementation based on oxygen saturation or arterial blood gases   Assess for changes in mentation and behavior   Respiratory therapy support as indicated     Problem: Cardiovascular - Adult  Goal: Maintains optimal cardiac output and hemodynamic stability  Outcome: Progressing  Flowsheets  Taken 10/10/2024 0800  Maintains optimal cardiac output and hemodynamic stability:   Monitor urine output and notify Licensed Independent Practitioner for values outside of normal range   Monitor blood pressure and heart rate   Assess for signs of decreased cardiac output  Taken 10/10/2024 0759  Maintains optimal cardiac output and hemodynamic stability:   Monitor blood pressure and heart rate   Monitor urine output and notify Licensed Independent Practitioner for values outside of normal range   Assess for signs of decreased cardiac output  Note: Off lidocaine gtt. Start oral  Goal: Absence of cardiac dysrhythmias or at baseline  Outcome:

## 2024-10-10 NOTE — PROGRESS NOTES
systolic heart failure (HCC) [I50.23]     Acute respiratory failure with hypoxia [J96.01]     Somnolence [R40.0]     V tach (HCC) [I47.20]     Ventricular tachycardia (HCC) [I47.20]     CKD (chronic kidney disease), stage III (HCC) [N18.30]     Essential hypertension [I10]     CAD (coronary artery disease) [I25.10]     Mixed hyperlipidemia [E78.2]          Hospital Day: 17    This is a 84 y.o. male who presented to [facility] on 9/24/2024 and is being treated for:    Vtach/NICM  -Daily labs; replace electrolytes as needed. Keep k >4, mag >2  Continue amiodarone and Toprol XL  Started on Lidocaine gtt on 10/9  -Continue Entresto  -Cardiology consulted  S/p AICD placement on 10/4  Recurrent  VT s/p DCCV on 10/6, s/p Amio gtt  EP following  S/P ICD discharged for VT on 10/9  Lidocaine gtt to Mexilitine today    Expressive aphasia  -CT head w/o contrast  -No MRI possible given recent ICD  -Neuro consult to evaluate    Fever  -Now with Klebsiella bacteremia  -On IV Merrem and Zyvox  -ID consult appreciated    Acute cholecystitis  -US show acute cholecysitis and cholelithiasis  -s/p lap di on 10/1,      Acute confusion/delirium  -Likely 2/2 infection versus ICU delirium  -Ativan IV PRN    Hematuria,gross  -Likely traumatic sweeney  -Continues to have bloody urine  -Continue bladder irrigation  -Eliquis on hold   -Urology following  -IR to perform CT Transcather embolectomy on 10/9    New onset acute systolic heart failure  -Probably 2/2 above  -Echo 9/25/24 showed LVEF 30-35%  -[facility] wnl  -I/Os    CKD III  -Creatinine 1.6 on admission; baseline approximately 1.3-1.7  -Avoid nephrotoxins  -Follow BMP    Chronic atrial flutter  -Holding eliquis for hematuria    On mechanical ventilation - resolved  -Extubated on 9/25  Off oxygen  Hypertension  -Continue home antihypertensives    Hyperlipidemia  -Continue home statin          Drugs that require monitoring for toxicity include: Eliquis and the method of monitoring was/is  CBC    DVT ppx: SCD  GI ppx: PPI  Diet: ADULT ORAL NUTRITION SUPPLEMENT; Dinner; Standard 4 oz Oral Supplement  ADULT ORAL NUTRITION SUPPLEMENT; Breakfast; Fortified Gelatin Oral Supplement  ADULT DIET; Dysphagia - Soft and Bite Sized  Code Status: Limited    Discussed with patient, nursing and CM.    Discussed with Dr Castle (IR).  No embolectomy at this time given bacteremia    Discussed with Joe Mandujano (EP NP) and  Dr Song (EP).  Lidocaine gtt to Mexilitine today.    Discussed above in detail with daughter at bedside.    [place] is VERY guarded.  Changed to limited code status.    Critical care time with patient was 35 minutes excluding separately billable procedures.    Marcus Childers MD  10/10/2024  4:57 PM

## 2024-10-10 NOTE — PROGRESS NOTES
IR Progress Note    Patient had originally been considered for prostate artery embolization. Since then he has developed positive blood cultures for klebsiella pneumoniae and there is expressive aphasia with clinical concern for acute stroke. At this time, we will await clinical course of the patient. Should he improve, we can offer PAE prior to discharge. Plan discussed with Dr. Marcus Childers.    Juan Castle MD  10/10/2024, 1:45 PM  Interventional Radiology  [phone] ([phone])

## 2024-10-10 NOTE — PROGRESS NOTES
Clinical Pharmacy Note: Positive Blood Culture Documentation    Pharmacy was notified by lab that Jonathan Dove has positive blood cultures.    Patient is positive for Klebsiella pneumoniae in one out of two sets.  Resistance markers present: none    Name of lab caller: Jade  Name of receiving pharmacist: Yunior Wolff  Time: 1122    Patient's current antimicrobial regimen of meropenem and zoyvox does provide appropriate empiric coverage.      Dr. Childers was notified of the positive result at 1158.    New antimicrobials initiated after physician discussion: none    Thank you,  Yunior Wolff, PharmD   [id number]

## 2024-10-10 NOTE — PROGRESS NOTES
Cognition  [facility]  Comment:   Orientation:    alert and oriented x 4  Command Following:   [facility]    Education  Barriers To Learning: hearing  Patient Education: patient educated on goals, PT role and benefits, plan of care, general safety, functional mobility training, transfer training, discharge recommendations  Learning Assessment:  patient verbalizes understanding, would benefit from continued reinforcement    Assessment  Activity Tolerance: Limited by weakness and confusion leading to severe balance and mobility deficits.  Impairments Requiring Therapeutic Intervention: decreased functional mobility, decreased ADL status, decreased strength, decreased endurance, decreased balance  Prognosis: good  Clinical Assessment: The patient is an 83 yo male admitted to [facility]   He was brought to the ER after being found to be in VT now s/p pacemaker/defibrillator 10/4/2024.  Patient is reportedly independent without a device and lives alone at baseline.      Patient much more confused today, difficulty following commands and requiring substantially more assistance for transfers, unable to ambulate as he was just 2 days prior with therapy.  Continue to recommend 24 hour assist and continued therapy at d/c.  Safety Interventions: patient left in chair, chair alarm in place, call light within reach, gait belt, patient at risk for falls, nurse notified, and family/caregiver present    Plan  Frequency: 3-5 x/per week  Current Treatment Recommendations: strengthening, balance training, functional mobility training, transfer training, gait training, endurance training, patient/caregiver education, home exercise program, safety education, equipment evaluation/education, and positioning    Goals  Patient Goals: Get stronger   Short Term Goals:  Time Frame: Before discharge  Patient will complete bed mobility at contact guard assistance   Patient will complete transfers at minimal assistance   Patient will ambulate 50 ft with use of  rolling walker at minimal assistance    Above goals reviewed on 10/10/2024.  All goals are ongoing at this time unless indicated above.      Therapy Session Time      Individual Group Co-treatment   Time In     1414   Time Out     1437   Minutes     23     Timed Code Treatment Minutes:  23 Minutes  Total Treatment Minutes:  23       Electronically Signed By: Neymar Walker PT, DPT, ATC-R [id number]

## 2024-10-10 NOTE — PROGRESS NOTES
is decompressed with a Castañeda catheter present in the lumen. Lower Chest:  Moderate right pleural effusion with dense opacification of the visualized right lower lobe.  Trace left pleural effusion with peripheral airspace changes also in the left lower lobe.  Postsurgical changes at the base of the heart. Organs: Interval cholecystectomy.  There is ill-defined fluid and air at the operative site which may be expected 2 days postop.  Mild residual mesenteric edema in the right upper quadrant.  No appreciated biliary dilatation.  The liver, pancreas and spleen appear normal.  Normal adrenal glands. GI/Bowel: The stomach, duodenum and small bowel are normal. Nonvisualized appendix.  No inflammation at the cecum.  Moderate stool in the proximal colon may represent ileus or constipation.  There is mild diverticular change of the sigmoid colon.  No inflammation.  Distally at the sigmoid colon and rectum, there is a diffuse pattern of mild mucosal thickening. Pelvis: Severe prostatomegaly contributing to mass effect upon the base of the bladder. Peritoneum/Retroperitoneum: Atherosclerotic changes of normal caliber aorta. No lymphadenopathy.  No appreciated abscess. Bones/Soft Tissues: No significant skeletal abnormalities.     1. Interval cholecystectomy with expected postoperative changes. 2. Moderate right pleural effusion with dense opacification of the right lower lobe. Trace left pleural effusion with peripheral airspace changes also in the left lower lobe.  These findings proceed surgery and may reflect pulmonary edema or underlying pneumonitis. 3. Mild mucosal thickening of the sigmoid colon and rectum may represent colitis or proctitis. 4. Severe prostatomegaly contributing to mass effect upon the base of the bladder. 5. Chronic cortical thinning of the bilateral kidneys with simple left renal cysts. No evidence of nephrolithiasis or obstructive uropathy.     FL CHOLANGIOGRAM OR    Result Date:  alert\" been called?->No Decision Support Exception - unselect if not a suspected or confirmed emergency medical condition->Emergency Medical Condition (MA) Reason for Exam: syncope FINDINGS: BRAIN/VENTRICLES: There is no acute intracranial hemorrhage, mass effect or midline shift.  No abnormal extra-axial fluid collection.  The gray-white differentiation is maintained without evidence of an acute infarct.  There is no evidence of hydrocephalus. Mild generalized cerebral volume loss and moderate chronic microvascular ischemic changes of the periventricular and subcortical white matter. ORBITS: The visualized portion of the orbits demonstrate no acute abnormality. SINUSES: The visualized paranasal sinuses and mastoid air cells demonstrate no acute abnormality. SOFT TISSUES/SKULL:  No acute abnormality of the visualized skull or soft tissues.     No acute intracranial abnormality. Mild generalized cerebral volume loss and moderate chronic microvascular ischemic changes of the periventricular and subcortical white matter.     XR CHEST PORTABLE    Result Date: 9/24/2024  EXAMINATION: ONE XRAY VIEW OF THE CHEST 9/24/2024 8:03 pm COMPARISON: Chest x-ray dated 03/13/2010. HISTORY: ORDERING SYSTEM PROVIDED HISTORY: v tach, cardioversion, S/P intubation TECHNOLOGIST PROVIDED HISTORY: Reason for exam:->v tach, cardioversion, S/P intubation FINDINGS: Medical devices: Endotracheal tube is noted with its tip approximately 3.8 cm from the yunior.  Cardiac valve replacements are noted.  Defibrillator pads are noted over the right upper chest and left upper abdomen. Heart/Mediastinum: The cardiac silhouette appears enlarged. Pleura/Lungs.  There is a small left pleural effusion and/or basilar atelectasis..  No appreciable pneumothorax. Bones/soft tissue: No acute abnormality.  There are median sternotomy wires.     1. Endotracheal tube is noted with its tip approximately 3.8 cm from the yunior. 2. Small left pleural effusion and/or

## 2024-10-11 NOTE — PROGRESS NOTES
[facility]   Electrophysiology   Date: 10/11/2024  Reason for Follow up: VT   Chief Complaint   Patient presents with    Irregular Heart Beat     Pt from home via [facility] for unstable V-tach, per EMS pt was in vtach but was alert and oriented with SBP in 80s. EMS gave 5 mg versed en route and cardioverted to NSR with PVCs, pt being bagged upon arrival, pulse still present       HPI: Jonathan Dove is a 84 y.o. male with h/o atrial fibrillation/flutter, PVCs, HLD, CVA, mild to moderate CAD.    S/p MVR (31 mm Magna Ease), AVR (25 mm Inspiris), and MALLORY clip in January 2020 at [facility].    Pt was brought to the hospital by EMS after he was found to have wide-complex tachycardia/VT and hypotension and had to be cardioverted en route to the hospital.  Patient has been intubated in the emergency room and has been admitted to the CVU.    He also has history of PVC in the past treated with Amiodarone and declined ablation.     He has been seen for Afib[facility]flutter [facility] and declined ablation and treated with amiodarone and and had cardioversion.      His ejection fraction got worse over time to 25-30% and Entresto / Farxiga was added and he was scheduled for LHC at [facility]. [facility]ICD was discussed with him at [facility].      Family states that he was not feeling well for the past few weeks and he was scheduled for LHC.      He has undergone cardiac catheterization which showed non-obstructive CAD with LV dysfunction with no intervention.     AICD implantation was discussed with him and he wants to proceed with procedure.    On 9/27 pt had imaging positive for cholelithiasis with cholecystitis, he has been on IV antibiotics. Had laparoscopic cholecystectomy 10/1.    Pt had ICD placed on 10/3 without complications. Was transferred out of CVU    Pt had multiple episodes of VT/VF with ATP therapy and 2 shocks delivered on 10/9. Was started on IV lidocaine.      Interval

## 2024-10-11 NOTE — PROGRESS NOTES
Urology Progress Note  [facility]     Patient: Jonathan Dove MRN: [id number]  Room/Bed: [facility]-2915/2915-01   YOB: 1940  Age/Sex: 84 y.o.male  Admission Date: 9/24/2024     Date of Service:  10/11/2024    ASSESSMENT/PLAN     1. Ventricular tachycardia (HCC)    2. STEMI (ST elevation myocardial infarction) ([facility])    3. Syncope and collapse    4. Acute encephalopathy    5. Septicemia (HCC)    6. Hypokalemia    7. V tach (HCC)    8. Acute cholecystitis    9. HFrEF (heart failure with reduced ejection fraction) (HCC)      Gross hematuria -this is mostly catheter related almost certainly and due to his large prostate. Hold Eliquis as able. We have been holding eliquis and patient has had hematuria ongoing x12 days. Hgb now 8.1. PAE was planned for 10/9 however patient went into [facility] that morning so procedure had been cancelled. Reviewed with medicine and IR and plan was to attempt to reschedule PAE for today, 10/11/24. However, he has since developed positive blood cultures for klebsiella and there is expressive aphasia with clinical concern for acute stroke. PAE scheduled for today has now been cancelled. Sweeney draining clear urine. Patient agitated.     Recommendations:  Continue sweeney. Urine appears in sweeney tubing. Drains well but decreased output when seen this morning. Monitor UOP, monitor leakage, continue oxybutynin. Discussed with family at bedside. Urine is clearing but PAE would still  be beneficial to prevent further bleeding - however, given clinical decline with positive blood cultures we will hold off on PAE today. Should he improve, we can offer PAE prior to discharge. Will plan to continue sweeney and monitor for GH/irrigate prn. Call with questions.     All patient questions were answered. He understands the plan as listed above.    SUBJECTIVE     Chief Complaint:   Chief Complaint   Patient presents with    Irregular Heart Beat     Pt from home via [facility] for unstable  10/7/2024  EXAMINATION: ONE SUPINE XRAY VIEW(S) OF THE ABDOMEN 10/7/2024 10:17 am COMPARISON: Abdominal CT HISTORY: ORDERING SYSTEM PROVIDED HISTORY: Nausea TECHNOLOGIST PROVIDED HISTORY: Reason for exam:->Nausea FINDINGS: Post sternotomy and valve replacement changes.  Scattered gaseous distention is seen throughout the abdomen.  Bowel gas pattern is nonspecific. Degenerative changes are seen     Nonspecific bowel gas pattern     Electrophysiology procedure    Addendum Date: 10/7/2024    [facility] Electrophysiology Procedure Note                                     Date of Procedure: 10/3/2024 Patient's Name: Jonathan Dove YOB: 1940 Medical Record Number: [id number] Procedure Performed by: Juan Song MD  Procedures performed: Insertion of MRI compatible right ventricular defibrillator lead under fluoroscopy. Insertion of MRI compatible right atrial lead under fluoroscopy Insertion of a MRI compatible dual chamber ICD. IV sedation. Sedation start time: 14:58 PM Sedation stop time: 16:08 PM An independent trained observer assisted in the monitoring of the patient's level of consciousness and physiological status including vital signs.  Indication of procedure: Jonathan Dove is a 84 y.o. male with ventricular tachycardia.  Details of procedure: The patient was brought to the electrophysiology laboratory in stable condition. The patient was in a fasting and non-sedated state. The risks, benefits and alternatives of the procedure were discussed with the patient. The risks including, but not limited to, the risks of vascular injury, bleeding, infection, device malfunction, lead dislodgement, radiation exposure, injury to cardiac and surrounding structures (including pneumothorax), stroke, myocardial infarction and death were discussed in detail. The patient opted to proceed with the device implantation. Written informed consent was signed and placed in the chart. Prophylactic

## 2024-10-11 NOTE — PROGRESS NOTES
[facility]      Met with patient daughter Lizeth, and Son Steffen at bedside to discuss hospice philosophy and services. No HCPOA Paperwork provided. Preferences for Code status/hospitalizations/spirtual needs discussed with family. Family is choosing to focus on Comfort Measures at this time. [facility] Reviewed [facility] stays are short term, and a backup plan will be reviewed by IPU team on site if needed. No non covered Item list requested at this time. Packet given and reviewed. Time spent listening to events of illness, answering questions, and providing emotional support. Consents signed by josefina Stanley, Pt has 3 children living, 1 passed, majority of children agreeable for Hospice. Consent accuracy approved by cert team. Contacted [employer] to disable AICD priot to transport. Contacted HOC doctor Dr Padilla Barnett MD to follow and notified of patient condition/symptoms and approved as higher level of care. [facility] Transport to transport patient between 4:00-4:15 PM to [facility]. DNRCC, and AVS Paperwork to be transported with patient to Inpatient Unit. HUC aware to give packet to transportation service. Mana HERRON RN and Wendy bedside RN, made aware of plan. RN to medicate patient prior to transport. Awaiting the [employer] rep to come and turn AICD off.   Navigators updated.      Thank you for this referral,  Please call HOC for questions/concerns at [phone]     Lizeth (Lee Ann) Clive, MSN, RN  Hospital Liaison    [address] [place] [address], [address], [place] [address]     C: [phone]/ F: [phone]    Main & Referrals: [phone]    [facility]

## 2024-10-11 NOTE — DISCHARGE SUMMARY
Hospital Medicine Discharge Summary    Patient: Jonathan Dove     Gender: male  : 1940   Age: 84 y.o.  MRN: [id number]    Admitting Physician: Yung Avina MD  Discharge Physician: Marcus Childers MD     Code Status: DNR-CC     Admit Date: 2024   Discharge Date:   10/11/24    Disposition:  IP HOC at [facility]    Discharge Diagnoses:    Active Hospital Problems    Diagnosis Date Noted    Bacteremia due to Klebsiella pneumoniae [R78.81, B96.1] 10/10/2024    ST elevation myocardial infarction (STEMI) ([facility]) [I21.3] 10/09/2024    History of prosthetic aortic valve [Z95.2] 10/09/2024    H/O prosthetic mitral valve [Z95.2] 10/09/2024    Acute encephalopathy [G93.40] 10/09/2024    HFrEF (heart failure with reduced ejection fraction) ([facility]) [I50.20] 10/09/2024    Hypokalemia [E87.6] 10/09/2024    Septicemia ([facility]) [A41.9] 10/09/2024    Syncope and collapse [R55] 10/09/2024    Acute cholecystitis [K81.0] 2024    Gross hematuria [R31.0] 2024    Acute cholecystitis due to biliary calculus [K80.00] 2024    On mechanically assisted ventilation (HCC) [Z99.11] 2024    Chronic atrial flutter (HCC) [I48.92] 2024    Acute on chronic systolic heart failure (HCC) [I50.23] 2024    Acute respiratory failure with hypoxia [J96.01] 2024    Somnolence [R40.0] 2024    V tach (HCC) [I47.20] 2024    Ventricular tachycardia (HCC) [I47.20] 2024    CKD (chronic kidney disease), stage III (HCC) [N18.30] 2019    Essential hypertension [I10] 2015    CAD (coronary artery disease) [I25.10] 2015    Mixed hyperlipidemia [E78.2]        Follow-up appointments:  one week    Outpatient to do list: F/U with PCP    Condition at Discharge:  Terminal    Hospital Course:   Patient admitted for V tach. Was shocked 1x on admission. Was intubated for cardioversion, now extubated.  Sent for cath nonobstructive, has low EF 30 to 35%.  Also found to have acute cholecystitis

## 2024-10-11 NOTE — PROGRESS NOTES
The patient has been made DNR CC.  Confirmed with RN.  The patient will be going for hospice and comfort options only.    Thanks for allowing me to participate in your patient's care. I will sign off today, but will be available to answer any further questions or concerns that may arise during patient's stay in the hospital.         Noel Dimas MD, MPH, FACP, [facility]  10/11/2024, 4:13 PM  Central Office Phone: [phone]  Central Office Fax: [phone]

## 2024-10-11 NOTE — CARE COORDINATION
Case Management -  Discharge Note      Patient Name: Jonathan Dove                   YOB: 1940            Readmission Risk (Low < 19, Mod (19-27), High > 27): Readmission Risk Score: 18.2    Current PCP: Raphael Huntley Jr., MD      (IMM) Important Message from Medicare:    Has pt received appropriate IMM before discharge if required: N/A  Date: N/A    PT AM-PAC: 10 /24  OT AM-PAC: 16 /24    Per HOC RN on unit patient is going to [place] inpatient unit at 4-4:30 PM today.     Financial    Payor: [employer] MEDICARE / Plan: [employer] MEDICARE ADVANTAGE HMO / Product Type: Medicare /     Pharmacy:  Potential assistance Purchasing Medications: No  Meds-to-Beds request: Yes      [facility] [id number] - [place], [place] - [address] [phone] - F [phone]  [address]  [place] [address]  Phone: [phone] Fax: [phone]      Notes:    Additional Case Management Notes: Patient discharged 10/11/2024 to [facility] inpatient unit in [place] via [facility] transport  at 4- 4:30 PM today   All discharge needs met per case management.    Mana Lowe RN, BSN  [phone]

## 2024-10-11 NOTE — PROGRESS NOTES
Speech Language Pathology  HOLD  Jonathan Dove   1940     Attempted to see pt for speech/dysphagia therapy follow-up. RN reported pt recently received ativan and is not appropriate for therapy at this time. Will hold and re-attempt to follow-up as therapy schedule allows and as pt is able to participate.    Thanks,  Wilda Da Silva MA CCC-SLP #[id number]  Speech Language Pathologist

## 2024-10-11 NOTE — CARE COORDINATION
VM received from Lee Ann OBANDO RN that pt will now be picked up between 5-5:30 PM today.    Mana Lowe RN, BSN  [phone]

## 2024-10-11 NOTE — PROGRESS NOTES
Palliative Care:        Family meeting conducted with Dr. Childers. In agreement for DNR-CC and hospice comfort care measures.     Per Dr. Childers in hopes patient would qualify for IPCC in [place] with HOC. Referral submitted. Awaiting intake.     Palliative team will continue to follow as needed. CM updated of these discussions.     Electronically signed by Baylee Santiago RN, BSN, PN (Palliative Care) [phone]

## 2024-10-11 NOTE — PROGRESS NOTES
1mg Ativan given per order for agitation/restlessness effective. Patient resting quietly in bed with eyes closed. Vitals monitored and documented. 2L NC applied for comfort.

## 2024-10-11 NOTE — CARE COORDINATION
Per palliative note MD hopes pt will qualify for IPCC in [place] with HOC. Referral submitted and awaiting intake.     Mana Lowe RN, BSN  [phone]

## 2024-10-11 NOTE — PROGRESS NOTES
Physical & Occupational Therapy  Patient more confused today, given Ativan, not appropriate for therapy at this time.    Neymar Walker PT, DPT, ATC-R [id number]  Lizeth Hartman, OTR/L [id number]

## 2024-10-13 LAB — BACTERIA BLD CULT ORG #2: NORMAL

## (undated) DEVICE — CATH LAB PACK: Brand: MEDLINE INDUSTRIES, INC.

## (undated) DEVICE — LIGHT HANDLE COVER: Brand: UNBRANDED

## (undated) DEVICE — TR BAND RADIAL ARTERY COMPRESSION DEVICE: Brand: TR BAND

## (undated) DEVICE — ELECTRODE PT RET AD L9FT HI MOIST COND ADH HYDRGEL CORDED

## (undated) DEVICE — BLADELESS OBTURATOR: Brand: WECK VISTA

## (undated) DEVICE — GENERAL ROBOT: Brand: MEDLINE INDUSTRIES, INC.

## (undated) DEVICE — MASIMOSET LNCS ADTX SPO2 ADULT PULSE OXIMETER ADHESIVE SENSOR: Brand: MASIMOSET® LNCS® ADTX SPO2 ADULT PULSE OXIMETER ADHESIVE SENSOR

## (undated) DEVICE — COVER,TABLE,77X90,STERILE: Brand: MEDLINE

## (undated) DEVICE — PMI DISPOSABLE PUNCTURE CLOSURE DEVICE / SUTURE GRASPER: Brand: PMI

## (undated) DEVICE — SUTURE MONOCRYL + SZ 4-0 L27IN ABSRB UD L19MM PS-2 3/8 CIR MCP426H

## (undated) DEVICE — SUTURE PDS + SZ 0 L36IN ABSRB VLT CT 1 L36MM 1 2 CIR PDP346H

## (undated) DEVICE — PLUMEPORT ACTIV LAPAROSCOPIC SMOKE FILTRATION DEVICE: Brand: PLUMEPORT ACTIVE

## (undated) DEVICE — KIT MICROPUNCTURE MINISTICK MAX  4FR X 10CM STIFF .018 NITINOL ECHOGENIC NEEDLE 2.75IN

## (undated) DEVICE — KIT INTRO 9FR L13CM DIA0.118IN SPLITTABLE HEMSTAT ROBUST

## (undated) DEVICE — 30978 SEE SHARP - ENHANCED INTRAOPERATIVE LAPAROSCOPE CLEANING & DEFOGGING: Brand: 30978 SEE SHARP - ENHANCED INTRAOPERATIVE LAPAROSCOPE CLEANING & DEFOGGING

## (undated) DEVICE — GUIDEWIRE VASC L260CM DIA0.035IN RAD 3MM J TIP L7CM PTFE

## (undated) DEVICE — CATHETER CHOLANGIOGRAM 4.5 FRX3 IN W/ 20018M55 TAUT INTRO

## (undated) DEVICE — GLIDESHEATH SLENDER STAINLESS STEEL KIT: Brand: GLIDESHEATH SLENDER

## (undated) DEVICE — Device: Brand: MEDEX

## (undated) DEVICE — STRIP,CLOSURE,WOUND,MEDI-STRIP,1/2X4: Brand: MEDLINE

## (undated) DEVICE — PAD PT POS 36 IN SURGYPAD DISP

## (undated) DEVICE — PENCIL ES L3M BTTN SWCH S STL HEX LOK BLDE ELECTRD HOLSTER

## (undated) DEVICE — CATHETER CHOLGM 4.5FR L18IN W/ MTL SUPP TB

## (undated) DEVICE — AGENT HEMOSTATIC SURGIFLOW MATRIX KIT W/THROMBIN

## (undated) DEVICE — DRAPE,ANGIO,BRACH,STERILE,38X44: Brand: MEDLINE

## (undated) DEVICE — SUCTION IRRIGATOR: Brand: ENDOWRIST

## (undated) DEVICE — BLADE CLIPPER GEN PURP NS

## (undated) DEVICE — SYRINGE, LUER LOCK, 10ML: Brand: MEDLINE

## (undated) DEVICE — INTRODUCER SHTH L13CM OD7FR SH ORNG HUB SEAMLESS SAFSHTH

## (undated) DEVICE — BAG RETRIEVAL SPECIMEN SUPERBAG 5 SMALL NYLON ITRODUCER

## (undated) DEVICE — CATHETER 5FR JL3.5 CORDIS 100CM

## (undated) DEVICE — ARM DRAPE

## (undated) DEVICE — HYPODERMIC SAFETY NEEDLE: Brand: MAGELLAN

## (undated) DEVICE — SEAL

## (undated) DEVICE — SOLUTION IRRIG 1000ML 0.9% SOD CHL USP POUR PLAS BTL

## (undated) DEVICE — KIT AT-X65 ANGIOTOUCH HAND CONTROLLER

## (undated) DEVICE — Device: Brand: NOMOLINE™ LH ADULT NASAL CO2 CANNULA WITH O2 4M

## (undated) DEVICE — PENCIL SMK EVAC L10FT TBNG NONSTICK ESU BLDE PLUMEPEN ELITE

## (undated) DEVICE — BLANKET WRM W29.9XL79.1IN UP BODY FORC AIR MISTRAL-AIR

## (undated) DEVICE — PROBE COVER KIT: Brand: MEDLINE INDUSTRIES, INC.

## (undated) DEVICE — SURGICEL ENDOSCP APPL

## (undated) DEVICE — PAD, DEFIB, ADULT, RADIOTRANS, PHYSIO: Brand: MEDLINE

## (undated) DEVICE — PACEMAKER PACK: Brand: MEDLINE INDUSTRIES, INC.

## (undated) DEVICE — CATHETER DIAG L100CM DIA5.2FR 0.038IN POLYUR COR JR4 STD

## (undated) DEVICE — INSUFFLATION NEEDLE TO ESTABLISH PNEUMOPERITONEUM.: Brand: INSUFFLATION NEEDLE

## (undated) DEVICE — RESTRAINT EXT ANK WRST LT BLU FOAM 2 STRP SIDE BCKL HK AND